# Patient Record
Sex: FEMALE | Race: OTHER | Employment: FULL TIME | URBAN - METROPOLITAN AREA
[De-identification: names, ages, dates, MRNs, and addresses within clinical notes are randomized per-mention and may not be internally consistent; named-entity substitution may affect disease eponyms.]

---

## 2017-01-19 ENCOUNTER — OFFICE VISIT (OUTPATIENT)
Dept: INTERNAL MEDICINE CLINIC | Age: 67
End: 2017-01-19

## 2017-01-19 VITALS
SYSTOLIC BLOOD PRESSURE: 145 MMHG | TEMPERATURE: 97.6 F | RESPIRATION RATE: 16 BRPM | HEIGHT: 62 IN | DIASTOLIC BLOOD PRESSURE: 88 MMHG | BODY MASS INDEX: 26.68 KG/M2 | HEART RATE: 88 BPM | WEIGHT: 145 LBS

## 2017-01-19 DIAGNOSIS — J01.10 SUBACUTE FRONTAL SINUSITIS: Primary | ICD-10-CM

## 2017-01-19 RX ORDER — AZITHROMYCIN 250 MG/1
250 TABLET, FILM COATED ORAL SEE ADMIN INSTRUCTIONS
Qty: 6 TAB | Refills: 0 | Status: SHIPPED | OUTPATIENT
Start: 2017-01-19 | End: 2017-01-24

## 2017-01-19 RX ORDER — HYDROCODONE POLISTIREX AND CHLORPHENIRAMINE POLISTIREX 10; 8 MG/5ML; MG/5ML
1 SUSPENSION, EXTENDED RELEASE ORAL
Qty: 115 ML | Refills: 0 | Status: SHIPPED | OUTPATIENT
Start: 2017-01-19 | End: 2018-02-21

## 2017-01-19 NOTE — PROGRESS NOTES
Subjective:      History was provided by the patient. Spencer Bliss is a 73 y.o. female who presents for evaluation of symptoms of a URI. Symptoms include sinus and nasal congestion, sore throat, nasal blockage and post nasal drip. Onset of symptoms was 3 week ago, gradually worsening since that time. She also c/o 2 days facial pain. She is drinking plenty of fluids. . Evaluation to date: none. Treatment to date: antihistamines, cough suppressants, OTC products  Past Medical History   Diagnosis Date    Aortic calcification ([address] 1/23/2014    Degenerative disc disease 5/23/2012     neck    Hepatitis C antibody test positive 7/14/2015    Hypertension     Lipoma of back 6/15/2012    Mood swings (HCC)      mood swings with menopause    Osteoporosis 12/7/2010    Raynaud's disease      Current Outpatient Prescriptions   Medication Sig Dispense Refill    buPROPion SR (WELLBUTRIN SR) 150 mg SR tablet Take 1 Tab by mouth two (2) times a day. 180 Tab 1    atorvastatin (LIPITOR) 10 mg tablet Take 0.5 Tabs by mouth daily. 90 Tab 0    ondansetron (ZOFRAN ODT) 4 mg disintegrating tablet Take 1 Tab by mouth every eight (8) hours as needed for Nausea. 20 Tab 0    felodipine (PLENDIL SR) 5 mg 24 hr tablet Take 1 Tab by mouth daily. 90 Tab 0    calcium 500 mg tab Take 500 mg by mouth daily.  b complex vitamins tablet Take 1 Tab by mouth daily.  magnesium oxide 500 mg tab Take 500 mg by mouth daily.  aspirin 81 mg chewable tablet Take 81 mg by mouth daily.  co-enzyme Q-10 (CO Q-10) 100 mg capsule Take 100 mg by mouth daily.  KRILL OIL PO Take  by mouth.  Alpha Lipoic Acid 200 mg Tab Take  by mouth.  MILK THISTLE, BULK, by Does Not Apply route.  CHOLECALCIFEROL, VITAMIN D3, (VITAMIN D3 PO) Take 2,000 Units by mouth daily.        Allergies   Allergen Reactions    Keflex [Cephalexin] Angioedema     Social History     Social History    Marital status:      Spouse name: N/A  Number of children: N/A    Years of education: N/A     Occupational History    Not on file. Social History Main Topics    Smoking status: Current Every Day Smoker     Packs/day: 0.50     Years: 15.00     Types: Cigarettes    Smokeless tobacco: Former User     Quit date: 1/27/2014    Alcohol use 1.5 oz/week     3 Glasses of wine per week      Comment: occasionally    Drug use: No    Sexual activity: Not Currently     Other Topics Concern    Not on file     Social History Narrative     Review of Systems  A comprehensive review of systems was negative except for that written in the HPI. Objective:     Visit Vitals    /88    Pulse 88    Temp 97.6 °F (36.4 °C) (Oral)    Resp 16    Ht 5' 2\" (1.575 m)    Wt 145 lb (65.8 kg)    BMI 26.52 kg/m2     General:  alert, cooperative, no distress, appears stated age   Head:  maxillary sinus tenderness bilateral   Eyes: negative   Ears:    Sinus tender: positive   Mouth:  Lips, mucosa, and tongue normal. Teeth and gums normal   Neck: supple, symmetrical, trachea midline, no adenopathy, thyroid: not enlarged, symmetric, no tenderness/mass/nodules, no carotid bruit and no JVD. Lungs: clear to auscultation bilaterally   Abdomen: soft, non-tender. Bowel sounds normal. No masses,  no organomegaly        Assessment:     viral upper respiratory illness and sinusitis    Plan:   Discussed dx and tx of URIs  Suggested symptomatic OTC remedies. Nasal saline sprays for congestion. Antibiotics per orders. RTC prn. French was seen today for cold symptoms. Diagnoses and all orders for this visit:    Subacute frontal sinusitis    Other orders  -     azithromycin (ZITHROMAX) 250 mg tablet; Take 1 Tab by mouth See Admin Instructions for 5 days. -     chlorpheniramine-HYDROcodone (TUSSIONEX) 10-8 mg/5 mL suspension; Take 5 mL by mouth every twelve (12) hours as needed for Cough. Max Daily Amount: 10 mL.

## 2017-01-19 NOTE — MR AVS SNAPSHOT
Visit Information Date & Time Provider Department Dept. Phone Encounter #  
 1/19/2017  8:30 AM Kane Mustafa MD [facility] [phone] [id number] Your Appointments 1/30/2017  3:20 PM  
New Patient with Eduardo Salazar MD  
[id number] [facility]) Appt Note: New Patient, Annual  
 [address] [facility] [address] [facility]  
[phone]  
  
   
 [address] [place] [facility] Upcoming Health Maintenance Date Due COLONOSCOPY 12/10/1969 BREAST CANCER SCRN MAMMOGRAM 12/10/2001 DTaP/Tdap/Td series (1 - Tdap) 1/3/2008 ZOSTER VACCINE AGE 60> 12/10/2011 GLAUCOMA SCREENING Q2Y 12/10/2016 Pneumococcal 65+ Low/Medium Risk (2 of 2 - PPSV23) 12/10/2016 MEDICARE YEARLY EXAM 12/10/2016 Allergies as of 1/19/2017  Review Complete On: 12/5/2016 By: Jemma Sauer LPN Severity Noted Reaction Type Reactions Keflex [Cephalexin]  09/28/2016    Angioedema Current Immunizations  Reviewed on 11/22/2016 Name Date Influenza Vaccine (Quad) PF 11/25/2014 Pneumococcal Vaccine (Unspecified Type) 9/29/2009, 1/2/2008 TD Vaccine 1/2/2008 Not reviewed this visit Vitals BP Pulse Temp Resp Height(growth percentile) Weight(growth percentile) 145/88 88 97.6 °F (36.4 °C) (Oral) 16 5' 2\" (1.575 m) 145 lb (65.8 kg) BMI OB Status Smoking Status 26.52 kg/m2 Postmenopausal Current Every Day Smoker Vitals History BMI and BSA Data Body Mass Index Body Surface Area  
 26.52 kg/m 2 1.7 m 2 Preferred Pharmacy Pharmacy Name Phone [id number] [phone] Your Updated Medication List  
  
   
This list is accurate as of: 1/19/17  9:08 AM.  Always use your most recent med list.  
  
  
  
  
 Alpha Lipoic Acid 200 mg Tab Take  by mouth. aspirin 81 mg chewable tablet Take 81 mg by mouth daily. atorvastatin 10 mg tablet Commonly known as:  LIPITOR Take 0.5 Tabs by mouth daily. azithromycin 250 mg tablet Commonly known as:  Jayna Collar Take 1 Tab by mouth See Admin Instructions for 5 days. b complex vitamins tablet Take 1 Tab by mouth daily. buPROPion  mg SR tablet Commonly known as:  Amanda Veronica Take 1 Tab by mouth two (2) times a day. calcium 500 mg Tab Take 500 mg by mouth daily. chlorpheniramine-HYDROcodone 10-8 mg/5 mL suspension Commonly known as:  Alea Haus Take 5 mL by mouth every twelve (12) hours as needed for Cough. Max Daily Amount: 10 mL. CO Q-10 100 mg capsule Generic drug:  co-enzyme Q-10 Take 100 mg by mouth daily. felodipine 5 mg 24 hr tablet Commonly known as:  PLENDIL SR Take 1 Tab by mouth daily. KRILL OIL PO Take  by mouth.  
  
 magnesium oxide 500 mg Tab Take 500 mg by mouth daily. MILK THISTLE (BULK)  
by Does Not Apply route. ondansetron 4 mg disintegrating tablet Commonly known as:  ZOFRAN ODT Take 1 Tab by mouth every eight (8) hours as needed for Nausea. VITAMIN D3 PO Take 2,000 Units by mouth daily. Prescriptions Printed Refills  
 chlorpheniramine-HYDROcodone (TUSSIONEX) 10-8 mg/5 mL suspension 0 Sig: Take 5 mL by mouth every twelve (12) hours as needed for Cough. Max Daily Amount: 10 mL. Class: Print Route: Oral  
  
Prescriptions Sent to Pharmacy Refills  
 azithromycin (ZITHROMAX) 250 mg tablet 0 Sig: Take 1 Tab by mouth See Admin Instructions for 5 days. Class: Normal  
 Pharmacy[id number] [place], [facility] [address] Ph #: [phone] Route: Oral  
  
To-Do List   
 2017 11:30 AM  
  Appointment with [facility] at [address] ([phone]) Please, no calcium supplements or antacids that coat the stomach (ex: Tums, Mylanta) 24 hours prior to procedure. Maintain normal diet and medications. Dairy products are allowed. Wear an outfit with an elastic waistband (no zipper or metal snaps). Check in at registration 15min before your appointment time unless you were instructed to do otherwise. Introducing [facility]! [employer] introduces CBRITE patient portal. Now you can access parts of your medical record, email your doctor's office, and request medication refills online. 1. In your internet browser, go to [email or web address] 2. Click on the First Time User? Click Here link in the Sign In box. You will see the New Member Sign Up page. 3. Enter your CBRITE Access Code exactly as it appears below. You will not need to use this code after youve completed the sign-up process. If you do not sign up before the expiration date, you must request a new code. · CBRITE Access Code: [email or web address] Expires: 4/17/2017 11:21 AM 
 
4. Enter the last four digits of your Social Security Number (xxxx) and Date of Birth (mm/dd/yyyy) as indicated and click Submit. You will be taken to the next sign-up page. 5. Create a CBRITE ID. This will be your CBRITE login ID and cannot be changed, so think of one that is secure and easy to remember. 6. Create a CBRITE password. You can change your password at any time. 7. Enter your Password Reset Question and Answer. This can be used at a later time if you forget your password. 8. Enter your e-mail address. You will receive e-mail notification when new information is available in [address] 9. Click Sign Up. You can now view and download portions of your medical record. 10. Click the Download Summary menu link to download a portable copy of your medical information.  
 
If you have questions, please visit the Frequently Asked Questions section of the [employer]. Remember, [employer] is NOT to be used for urgent needs. For medical emergencies, dial 911. Now available from your iPhone and Android! Please provide this summary of care documentation to your next provider. Your primary care clinician is listed as Molly Elliott. If you have any questions after today's visit, please call [phone].

## 2017-01-30 ENCOUNTER — HOSPITAL ENCOUNTER (OUTPATIENT)
Dept: MAMMOGRAPHY | Age: 67
Discharge: HOME | End: 2017-01-30
Attending: INTERNAL MEDICINE
Payer: MEDICARE

## 2017-01-30 DIAGNOSIS — M81.0 OSTEOPOROSIS: Primary | ICD-10-CM

## 2017-01-30 DIAGNOSIS — Z78.0 MENOPAUSE: ICD-10-CM

## 2017-01-30 PROCEDURE — 77080 DXA BONE DENSITY AXIAL: CPT

## 2017-01-30 RX ORDER — ALENDRONATE SODIUM 70 MG/1
70 TABLET ORAL
Qty: 12 TAB | Refills: 3 | Status: SHIPPED | OUTPATIENT
Start: 2017-01-30 | End: 2017-12-13 | Stop reason: SINTOL

## 2017-01-30 NOTE — LETTER
2/8/2017 2:33 PM 
 
[facility] [address] [address] Dear Ines Jewell: 
 
Please find your most recent results below. Resulted Orders PTH INTACT Result Value Ref Range PTH, Intact 43 15 - 65 pg/mL Narrative Performed at:  [address]  [id number] : Una Zacarias MD, Phone:  [phone] PHOSPHORUS Result Value Ref Range Phosphorus 3.9 2.5 - 4.5 mg/dL Narrative Performed at:  [address]  [id number] : Una Zacarias MD, Phone:  [phone] PROTEIN ELECTROPHORESIS Result Value Ref Range Protein, total 6.9 6.0 - 8.5 g/dL Albumin 4.4 2.9 - 4.4 g/dL Alpha-1-globulin 0.2 0.0 - 0.4 g/dL ALPHA-2 GLOBULIN 0.7 0.4 - 1.0 g/dL Beta globulin 1.0 0.7 - 1.3 g/dL Gamma globulin 0.6 0.4 - 1.8 g/dL  
 M-spike Not Observed Not Observed g/dL Globulin, total 2.5 2.2 - 3.9 g/dL A/G ratio 1.8 (H) 0.7 - 1.7 Please note Comment Comment:  
   Protein electrophoresis scan will follow via computer, mail, or 
 delivery. Narrative Performed at:  [address]  [id number] : Una Zacarias MD, Phone:  [phone] VITAMIN D, 25 HYDROXY Result Value Ref Range VITAMIN D, 25-HYDROXY 38.5 30.0 - 100.0 ng/mL Comment:  
   Vitamin D deficiency has been defined by the [address] practice guideline as a 
level of serum 25-OH vitamin D less than 20 ng/mL (1,2). The Endocrine Society went on to further define vitamin D 
insufficiency as a level between 21 and 29 ng/mL (2). 1. IOM ([place] of [employer]). 2010. Dietary reference 
   intakes for calcium and D. [facility]: The 
   [employer].  
2. Jess FITCH, Sona RAMIREZ, Adalgisa PHIPPS, et al. 
   Evaluation, treatment, and prevention of vitamin D 
 deficiency: an Endocrine Society clinical practice 
   guideline. JCEM. 2011 Jul; 96(7):1911-30. Narrative Performed at:  [address]  [id number] : Simon Marte MD, Phone:  [phone] CELIAC ANTIBODY PROFILE Result Value Ref Range Immunoglobulin A, Qt. 138 87 - 352 mg/dL Deamidated Gliadin Ab, IgA 2 0 - 19 units Comment:  
                      Negative                   0 - 19 Weak Positive             20 - 30 Moderate to Strong Positive   >30 Deamidated Gliadin Ab, IgG 2 0 - 19 units Comment:  
                      Negative                   0 - 19 Weak Positive             20 - 30 Moderate to Strong Positive   >30 
  
 t-Transglutaminase, IgA <2 0 - 3 U/mL Comment:  
                                 Negative        0 -  3 Weak Positive   4 - 10 Positive           >10 Tissue Transglutaminase (tTG) has been identified 
 as the endomysial antigen. Studies have demonstr- 
 ated that endomysial IgA antibodies have over 99% 
 specificity for gluten sensitive enteropathy. t-Transglutaminase, IgG <2 0 - 5 U/mL Comment:  
                                 Negative        0 - 5 Weak Positive   6 - 9 Positive           >9 Narrative Performed at:  [address]  [id number] : Simon Marte MD, Phone:  [phone] RECOMMENDATIONS: 
   
All the labs are very normal now. Please call me if you have any questions: [phone] Sincerely, Miriam Solo MD

## 2017-01-31 NOTE — PROGRESS NOTES
Advise patient that osteoporosis  Which is bad! is present  ( ordered additional blood work   For bone health now)    Advise begin fosamax 70 weekly along with the vit D and calcium I will order the   Alendronate now    any concerns she should follow up to review  these

## 2017-02-02 NOTE — PROGRESS NOTES
Notified the patient per Dr Nuvia Padilla of the following:  Advise patient that osteoporosis (which is bad!) is present. Additional blood work needed to check for bone health. Advise beginning Fosamax 70 mg weekly along with Vitamin D and Calcium. The new medication has been sent to the pharmacy on file. Provided education to take pill first thing in the morning on empty stomach with a full glass of water.  Must sit upright for 30 minutes.  Do not lay down.  Take on the same day each week. Scheduled the patient to be seen on Monday at 8:45 am for review with Dr Nuvia Padilla.

## 2017-02-06 ENCOUNTER — OFFICE VISIT (OUTPATIENT)
Dept: INTERNAL MEDICINE CLINIC | Age: 67
End: 2017-02-06

## 2017-02-06 ENCOUNTER — HOSPITAL ENCOUNTER (OUTPATIENT)
Dept: LAB | Age: 67
Discharge: HOME | End: 2017-02-06
Payer: MEDICARE

## 2017-02-06 VITALS
BODY MASS INDEX: 27.05 KG/M2 | SYSTOLIC BLOOD PRESSURE: 161 MMHG | OXYGEN SATURATION: 99 % | DIASTOLIC BLOOD PRESSURE: 84 MMHG | WEIGHT: 147 LBS | TEMPERATURE: 97.9 F | RESPIRATION RATE: 15 BRPM | HEIGHT: 62 IN | HEART RATE: 95 BPM

## 2017-02-06 DIAGNOSIS — M54.31 SCIATICA OF RIGHT SIDE: ICD-10-CM

## 2017-02-06 DIAGNOSIS — M81.0 OSTEOPOROSIS: Primary | ICD-10-CM

## 2017-02-06 PROCEDURE — 84100 ASSAY OF PHOSPHORUS: CPT

## 2017-02-06 PROCEDURE — 82784 ASSAY IGA/IGD/IGG/IGM EACH: CPT

## 2017-02-06 PROCEDURE — 82306 VITAMIN D 25 HYDROXY: CPT

## 2017-02-06 PROCEDURE — 83970 ASSAY OF PARATHORMONE: CPT

## 2017-02-06 PROCEDURE — 84165 PROTEIN E-PHORESIS SERUM: CPT

## 2017-02-06 PROCEDURE — 36415 COLL VENOUS BLD VENIPUNCTURE: CPT

## 2017-02-06 RX ORDER — HYDROCODONE BITARTRATE AND ACETAMINOPHEN 5; 325 MG/1; MG/1
1 TABLET ORAL
Qty: 30 TAB | Refills: 0 | Status: SHIPPED | OUTPATIENT
Start: 2017-02-06 | End: 2017-04-18 | Stop reason: SDUPTHER

## 2017-02-06 NOTE — PROGRESS NOTES
Subjective:      Spencer Bliss is a 73 y.o. female who I am asked to see in consultation for evaluation osteoporosis. She was diagnosed with osteoporosis by bone density scan in 2017. Patient has history of fracture. The cause of osteoporosis is felt to be due to postmenopausal estrogen deficiency. She is currently being treated with calcium and vitamin D supplementation. She is not currently being treated with bisphosphonates   Osteoporosis Risk Factors   Nonmodifiable  Personal Hx of fracture as an adult: yes - rib s[pine    Hx of fracture in first-degree relative: no   race: yes  Advanced age: yes  Female sex: yes  Dementia: no  Poor health/frailty: no     Potentially modifiable:  Tobacco use: yes -   Low body weight (<127 lbs): no  Estrogen deficiency     early menopause (age <45) or bilateral ovariectomy: no     prolonged premenopausal amenorrhea (>1 yr): no  Low calcium intake (lifelong): no  Alcoholism: no  Recurrent falls: no  Inadequate physical activity: no    Current calcium and Vit D intake:  Dietary sources: cheese  supplements: yes      Past Medical History   Diagnosis Date    Aortic calcification (HCC) 2014    Degenerative disc disease 2012     neck    Hepatitis C antibody test positive 2015    Hypertension     Lipoma of back 6/15/2012    Mood swings (HCC)      mood swings with menopause    Osteoporosis 2010    Raynaud's disease      Past Surgical History   Procedure Laterality Date    Hx cholecystectomy  1974    Hx  section  0100,[id number],3156      x 3     Family History   Problem Relation Age of Onset    Parkinsonism Mother     Hypertension Father     Diabetes Father     Heart Disease Father 61          Current Outpatient Prescriptions   Medication Sig Dispense Refill    alendronate (FOSAMAX) 70 mg tablet Take 1 Tab by mouth every seven (7) days.  12 Tab 3    chlorpheniramine-HYDROcodone (TUSSIONEX) 10-8 mg/5 mL suspension Take 5 mL by mouth every twelve (12) hours as needed for Cough. Max Daily Amount: 10 mL. 115 mL 0    buPROPion SR (WELLBUTRIN SR) 150 mg SR tablet Take 1 Tab by mouth two (2) times a day. 180 Tab 1    atorvastatin (LIPITOR) 10 mg tablet Take 0.5 Tabs by mouth daily. 90 Tab 0    ondansetron (ZOFRAN ODT) 4 mg disintegrating tablet Take 1 Tab by mouth every eight (8) hours as needed for Nausea. 20 Tab 0    felodipine (PLENDIL SR) 5 mg 24 hr tablet Take 1 Tab by mouth daily. 90 Tab 0    calcium 500 mg tab Take 500 mg by mouth daily.  b complex vitamins tablet Take 1 Tab by mouth daily.  magnesium oxide 500 mg tab Take 500 mg by mouth daily.  aspirin 81 mg chewable tablet Take 81 mg by mouth daily.  co-enzyme Q-10 (CO Q-10) 100 mg capsule Take 100 mg by mouth daily.  KRILL OIL PO Take  by mouth.  Alpha Lipoic Acid 200 mg Tab Take  by mouth.  MILK THISTLE, BULK, by Does Not Apply route.  CHOLECALCIFEROL, VITAMIN D3, (VITAMIN D3 PO) Take 2,000 Units by mouth daily. Allergies   Allergen Reactions    Keflex [Cephalexin] Angioedema     Social History     Social History    Marital status:      Spouse name: N/A    Number of children: N/A    Years of education: N/A     Occupational History    Not on file. Social History Main Topics    Smoking status: Current Every Day Smoker     Packs/day: 0.50     Years: 15.00     Types: Cigarettes    Smokeless tobacco: Former User     Quit date: 1/27/2014    Alcohol use 1.5 oz/week     3 Glasses of wine per week      Comment: occasionally    Drug use: No    Sexual activity: Not Currently     Other Topics Concern    Not on file     Social History Narrative       Review of Systems  Pertinent items are noted in HPI.      Objective:     Visit Vitals    /84 (BP 1 Location: Left arm, BP Patient Position: Sitting)    Pulse 95    Temp 97.9 °F (36.6 °C) (Oral)    Resp 15    Ht 5' 2\" (1.575 m)    Wt 147 lb (66.7 kg)    SpO2 99%    BMI 26.89 kg/m2     General:  alert, cooperative, no distress, appears stated age   Body Habitus not obese   Oropharynx: \    Eyes:      Ears:     Neck:    Thyroid:  no palpable nodule   Lung: clear to auscultation bilaterally   Heart:  regular rate and rhythm, S1, S2 normal, no murmur, click, rub or gallop   Abdomen: soft, non-tender. Bowel sounds normal. No masses,  no organomegaly   Extremities: extremities normal, atraumatic, no cyanosis or edema   Skin: Warm and dry. no hyperpigmentation, vitiligo, or suspicious lesions   Pulses: 2+ and symmetric   Neuro: normal without focal findings   Lower  to right thigh  Imaging  Bone Density: Spine T Score:   DEXA Results (most recent):    Results from Hospital Encounter encounter on 01/30/17   DEXA BONE DENSITY STUDY AXIAL   Narrative Bone Mineral Density    Indication:  screening  Age: 73  Sex: Female. Menopause status: Postmenopausal.  Hormone replacement therapy: No     Number of falls in the past year:   None. Risk factors for osteoporosis:  History of low trauma fracture      Current medication for osteoporosis: None. Comparison: None. Technique: Imaging was performed on the [employer]. World Health Organization  meta-analysis fracture risk calculator (FRAX) analysis was performed for 10 year  fracture risk probability assessment    Excluded sites: None     Findings:      Femoral Neck:  Left  Bone mineral density (gm/cm2):? 0.662  % of peak bone mass: 64  % for age matched controls:? 78  T-score: -2.7  Z-score: -1.3    Total Hip: Left  Bone mineral density (gm/cm2):  0.715  % of peak bone mass:   71  % for age matched controls:  80  T-score:   -2.3  Z-score:  -1.1    Lumbar Spine:  L1-L4  Bone mineral density (gm/cm2):  0.894  % of peak bone mass:  75   % for age matched controls:  90  T-score:  -2.4  Z-score:  -0.9    The T score for the left distal third radius is -3.6. Impression Impression:     This patient is osteoporotic using the World Health Organization criteria  10 year probability of major osteoporotic fracture:  14%  10 year probability of hip fracture:  3.5%    Recommendations:  Therapy recommendations need to be tailored to each individual patient. Using  the [facility]) FRAX absolute fracture algorithm, the  [address] [place] [address] recommends beginning pharmacological therapy in  postmenopausal women and men over the age of 48 with a 8 year probability of a  hip fracture of >3% OR with the 10 year probability of a major osteoporotic  fracture of >20%. Please reconsider testing based on risk factors. Currently, Medicare will only  reimburse for a central DXA examination every two years, unless the patient is  on chronic glucocorticoid therapy. Note: Please note that reliable, valid comparisons cannot be made between  studies which have been performed on machines from different manufacturers. If  clinically warranted, a follow up study performed at this site, on the same  unit, would allow the most sensitive assessment of change in bone mineral  density. Assessment:     Osteoporosis, not on current therapy. Additional labs pending  Plan:   (Note: The National Osteoporosis Foundation recommends pharmacological treatment of all postmenopausal women with T-scores below -2.0, and those with T-scores below -1.5 with risk factors for osteoporosis. Osteoporosis is present when the T-score is below -2.5. The T-score is defined as the number of standard deviations above or below the average BMD value for young, healthy white women.)    1. Bisphosphonate therapy: alendronate (Fosamax), once weekly  Contraindications reviewed: yes  2. Calcium and vitamin D supplementation. Patient advised not to take these supplements at same time as bisphosphonate due to inhibition of absorption: yes  3.  The risks and benefits of my recommendations, as well as other treatment options were discussed with the patient today. Questions were answered. 4. Follow up: 6 month and as needed. 5. Approximately 15 minutes of this 15   minute appointment was spent counseling,  explaining diabetes and its etiology/treatment, and answering questions. There are no diagnoses linked to this encounter.

## 2017-02-06 NOTE — MR AVS SNAPSHOT
Visit Information Date & Time Provider Department Dept. Phone Encounter #  
 2/6/2017  8:45 AM Herman Stone MD [facility] [phone] [id number] Upcoming Health Maintenance Date Due COLONOSCOPY 12/10/1969 BREAST CANCER SCRN MAMMOGRAM 12/10/2001 DTaP/Tdap/Td series (1 - Tdap) 1/3/2008 ZOSTER VACCINE AGE 60> 12/10/2011 GLAUCOMA SCREENING Q2Y 12/10/2016 Pneumococcal 65+ Low/Medium Risk (2 of 2 - PPSV23) 12/10/2016 MEDICARE YEARLY EXAM 12/10/2016 Allergies as of 2/6/2017  Review Complete On: 2/6/2017 By: Gary Rea LPN Severity Noted Reaction Type Reactions Keflex [Cephalexin]  09/28/2016    Angioedema Current Immunizations  Reviewed on 11/22/2016 Name Date Influenza Vaccine (Quad) PF 11/25/2014 Pneumococcal Vaccine (Unspecified Type) 9/29/2009, 1/2/2008 TD Vaccine 1/2/2008 Not reviewed this visit Vitals BP Pulse Temp Resp Height(growth percentile) Weight(growth percentile) 161/84 (BP 1 Location: Left arm, BP Patient Position: Sitting) 95 97.9 °F (36.6 °C) (Oral) 15 5' 2\" (1.575 m) 147 lb (66.7 kg) SpO2 BMI OB Status Smoking Status 99% 26.89 kg/m2 Postmenopausal Current Every Day Smoker BMI and BSA Data Body Mass Index Body Surface Area  
 26.89 kg/m 2 1.71 m 2 Preferred Pharmacy Pharmacy Name Phone [facility][id number]- [place] [address] [place] [address] Ave [phone] Your Updated Medication List  
  
   
This list is accurate as of: 2/6/17  9:15 AM.  Always use your most recent med list.  
  
  
  
  
 alendronate 70 mg tablet Commonly known as:  FOSAMAX Take 1 Tab by mouth every seven (7) days. Alpha Lipoic Acid 200 mg Tab Take  by mouth. aspirin 81 mg chewable tablet Take 81 mg by mouth daily. atorvastatin 10 mg tablet Commonly known as:  LIPITOR Take 0.5 Tabs by mouth daily. b complex vitamins tablet Take 1 Tab by mouth daily. buPROPion  mg SR tablet Commonly known as:  Millicent [place] Take 1 Tab by mouth two (2) times a day. calcium 500 mg Tab Take 500 mg by mouth daily. chlorpheniramine-HYDROcodone 10-8 mg/5 mL suspension Commonly known as:  Barbara Horsfall Take 5 mL by mouth every twelve (12) hours as needed for Cough. Max Daily Amount: 10 mL. CO Q-10 100 mg capsule Generic drug:  co-enzyme Q-10 Take 100 mg by mouth daily. felodipine 5 mg 24 hr tablet Commonly known as:  PLENDIL SR Take 1 Tab by mouth daily. HYDROcodone-acetaminophen 5-325 mg per tablet Commonly known as:  Lynnetta Brower Take 1 Tab by mouth every six (6) hours as needed for Pain. Max Daily Amount: 4 Tabs. KRILL OIL PO Take  by mouth.  
  
 magnesium oxide 500 mg Tab Take 500 mg by mouth daily. MILK THISTLE (BULK)  
by Does Not Apply route. ondansetron 4 mg disintegrating tablet Commonly known as:  ZOFRAN ODT Take 1 Tab by mouth every eight (8) hours as needed for Nausea. VITAMIN D3 PO Take 2,000 Units by mouth daily. Prescriptions Printed Refills HYDROcodone-acetaminophen (NORCO) 5-325 mg per tablet 0 Sig: Take 1 Tab by mouth every six (6) hours as needed for Pain. Max Daily Amount: 4 Tabs. Class: Print Route: Oral  
  
Introducing [facility] & HEALTH SERVICES! Stewart Kemp introduces [employer] patient portal. Now you can access parts of your medical record, email your doctor's office, and request medication refills online. 1. In your internet browser, go to [email or web address] 2. Click on the First Time User? Click Here link in the Sign In box. You will see the New Member Sign Up page. 3. Enter your [employer] Access Code exactly as it appears below. You will not need to use this code after youve completed the sign-up process.  If you do not sign up before the expiration date, you must request a new code. · Collect Access Code: [email or web address] Expires: 4/17/2017 11:21 AM 
 
4. Enter the last four digits of your Social Security Number (xxxx) and Date of Birth (mm/dd/yyyy) as indicated and click Submit. You will be taken to the next sign-up page. 5. Create a Collect ID. This will be your Collect login ID and cannot be changed, so think of one that is secure and easy to remember. 6. Create a Collect password. You can change your password at any time. 7. Enter your Password Reset Question and Answer. This can be used at a later time if you forget your password. 8. Enter your e-mail address. You will receive e-mail notification when new information is available in [address] 9. Click Sign Up. You can now view and download portions of your medical record. 10. Click the Download Summary menu link to download a portable copy of your medical information. If you have questions, please visit the Frequently Asked Questions section of the Collect website. Remember, Collect is NOT to be used for urgent needs. For medical emergencies, dial 911. Now available from your iPhone and Android! Please provide this summary of care documentation to your next provider. Your primary care clinician is listed as Vero Khan. If you have any questions after today's visit, please call [phone].

## 2017-02-08 LAB
25(OH)D3+25(OH)D2 SERPL-MCNC: 38.5 NG/ML (ref 30–100)
ALBUMIN SERPL ELPH-MCNC: 4.4 G/DL (ref 2.9–4.4)
ALBUMIN/GLOB SERPL: 1.8 {RATIO} (ref 0.7–1.7)
ALPHA1 GLOB SERPL ELPH-MCNC: 0.2 G/DL (ref 0–0.4)
ALPHA2 GLOB SERPL ELPH-MCNC: 0.7 G/DL (ref 0.4–1)
B-GLOBULIN SERPL ELPH-MCNC: 1 G/DL (ref 0.7–1.3)
GAMMA GLOB SERPL ELPH-MCNC: 0.6 G/DL (ref 0.4–1.8)
GLIADIN PEPTIDE IGA SER-ACNC: 2 UNITS (ref 0–19)
GLIADIN PEPTIDE IGG SER-ACNC: 2 UNITS (ref 0–19)
GLOBULIN SER CALC-MCNC: 2.5 G/DL (ref 2.2–3.9)
IGA SERPL-MCNC: 138 MG/DL (ref 87–352)
M PROTEIN SERPL ELPH-MCNC: ABNORMAL G/DL
PHOSPHATE SERPL-MCNC: 3.9 MG/DL (ref 2.5–4.5)
PLEASE NOTE, 011150: ABNORMAL
PROT SERPL-MCNC: 6.9 G/DL (ref 6–8.5)
PTH-INTACT SERPL-MCNC: 43 PG/ML (ref 15–65)
TTG IGA SER-ACNC: <2 U/ML (ref 0–3)
TTG IGG SER-ACNC: <2 U/ML (ref 0–5)

## 2017-02-08 NOTE — PROGRESS NOTES
Letter sent to the address on file to notify the patient per Dr Juvenal Landaverde that all the labs are very normal now. Asked for a return call to the office with any further questions.

## 2017-02-22 RX ORDER — FELODIPINE 5 MG/1
TABLET, EXTENDED RELEASE ORAL
Qty: 90 TAB | Refills: 1 | Status: SHIPPED | OUTPATIENT
Start: 2017-02-22 | End: 2017-09-01 | Stop reason: SDUPTHER

## 2017-03-27 RX ORDER — BUPROPION HYDROCHLORIDE 150 MG/1
TABLET, EXTENDED RELEASE ORAL
Qty: 180 TAB | Refills: 1 | Status: SHIPPED | OUTPATIENT
Start: 2017-03-27 | End: 2017-10-28 | Stop reason: SDUPTHER

## 2017-04-13 ENCOUNTER — OFFICE VISIT (OUTPATIENT)
Dept: INTERNAL MEDICINE CLINIC | Age: 67
End: 2017-04-13

## 2017-04-13 VITALS
OXYGEN SATURATION: 97 % | WEIGHT: 145 LBS | RESPIRATION RATE: 15 BRPM | BODY MASS INDEX: 26.52 KG/M2 | SYSTOLIC BLOOD PRESSURE: 140 MMHG | DIASTOLIC BLOOD PRESSURE: 90 MMHG | TEMPERATURE: 98.2 F | HEART RATE: 95 BPM

## 2017-04-13 DIAGNOSIS — J18.9 PNEUMONIA OF RIGHT LOWER LOBE DUE TO INFECTIOUS ORGANISM: Primary | ICD-10-CM

## 2017-04-13 PROBLEM — Z72.0 TOBACCO ABUSE: Status: ACTIVE | Noted: 2017-04-13

## 2017-04-13 RX ORDER — LEVOFLOXACIN 750 MG/1
750 TABLET ORAL DAILY
Qty: 5 TAB | Refills: 0 | Status: SHIPPED | OUTPATIENT
Start: 2017-04-13 | End: 2017-12-13 | Stop reason: ALTCHOICE

## 2017-04-13 NOTE — PROGRESS NOTES
Subjective:      History was provided by the patient. Bobby Rucker is a 73 y.o. female who presents for evaluation of symptoms of a URI.m   For 6 weeks Symptoms include cough mucous  and throat,  sore. Onset of symptoms was 3 week ago, gradually worsening since that time. Some hot flashes. She is drinking plenty of fluids. . Evaluation to date: none. Treatment to date: antihistamines, cough suppressants, OTC products   Sweats   Some wheeze  Past Medical History:   Diagnosis Date    Aortic calcification (HCC) 1/23/2014    Degenerative disc disease 5/23/2012    neck    Hepatitis C antibody test positive 7/14/2015    Hypertension     Lipoma of back 6/15/2012    Mood swings (HCC)     mood swings with menopause    Osteoporosis 12/7/2010    Raynaud's disease      Current Outpatient Prescriptions   Medication Sig Dispense Refill    buPROPion SR (WELLBUTRIN SR) 150 mg SR tablet TAKE 1 TAB BY MOUTH TWO (2) TIMES A DAY. 180 Tab 1    felodipine (PLENDIL SR) 5 mg 24 hr tablet TAKE 1 TABLET BY MOUTH DAILY. 90 Tab 1    HYDROcodone-acetaminophen (NORCO) 5-325 mg per tablet Take 1 Tab by mouth every six (6) hours as needed for Pain. Max Daily Amount: 4 Tabs. 30 Tab 0    alendronate (FOSAMAX) 70 mg tablet Take 1 Tab by mouth every seven (7) days. 12 Tab 3    chlorpheniramine-HYDROcodone (TUSSIONEX) 10-8 mg/5 mL suspension Take 5 mL by mouth every twelve (12) hours as needed for Cough. Max Daily Amount: 10 mL. 115 mL 0    atorvastatin (LIPITOR) 10 mg tablet Take 0.5 Tabs by mouth daily. 90 Tab 0    ondansetron (ZOFRAN ODT) 4 mg disintegrating tablet Take 1 Tab by mouth every eight (8) hours as needed for Nausea. 20 Tab 0    felodipine (PLENDIL SR) 5 mg 24 hr tablet Take 1 Tab by mouth daily. 90 Tab 0    calcium 500 mg tab Take 500 mg by mouth daily.  b complex vitamins tablet Take 1 Tab by mouth daily.  magnesium oxide 500 mg tab Take 500 mg by mouth daily.       aspirin 81 mg chewable tablet Take 81 mg by mouth daily.  co-enzyme Q-10 (CO Q-10) 100 mg capsule Take 100 mg by mouth daily.  KRILL OIL PO Take  by mouth.  Alpha Lipoic Acid 200 mg Tab Take  by mouth.  MILK THISTLE, BULK, by Does Not Apply route.  CHOLECALCIFEROL, VITAMIN D3, (VITAMIN D3 PO) Take 2,000 Units by mouth daily. Allergies   Allergen Reactions    Keflex [Cephalexin] Angioedema     Social History     Social History    Marital status:      Spouse name: N/A    Number of children: N/A    Years of education: N/A     Occupational History    Not on file. Social History Main Topics    Smoking status: Current Every Day Smoker     Packs/day: 0.50     Years: 15.00     Types: Cigarettes    Smokeless tobacco: Former User     Quit date: 1/27/2014    Alcohol use 1.5 oz/week     3 Glasses of wine per week      Comment: occasionally    Drug use: No    Sexual activity: Not Currently     Other Topics Concern    Not on file     Social History Narrative     Review of Systems  A comprehensive review of systems was negative except for that written in the HPI. Objective:     Visit Vitals    BP (!) 153/97 (BP 1 Location: Left arm, BP Patient Position: Sitting)    Pulse 95    Temp 98.2 °F (36.8 °C) (Oral)    Resp 15    Wt 145 lb (65.8 kg)    SpO2 97%    BMI 26.52 kg/m2     General:  alert, cooperative, no distress, appears stated age       Eyes: negative   Ears:    Sinus tender: positive   Mouth:  Lips, mucosa, and tongue normal. Teeth and gums normal   Neck: supple, symmetrical, trachea midline, no adenopathy, thyroid: not enlarged, symmetric, no tenderness/mass/nodules, no carotid bruit and no JVD. Lungs:    Rales right base   Abdomen: soft, non-tender. Bowel sounds normal. No masses,  no organomegaly        Assessment:     Pneumonia   Cap  Or nursing home acquired    Plan:   Discussed dx and tx of URIs  Suggested symptomatic OTC remedies. Nasal saline sprays for congestion.   Antibiotics per orders. RTC prn. There are no diagnoses linked to this encounter. French was seen today for cough, skin problem and fatigue. Diagnoses and all orders for this visit:    Pneumonia of right lower lobe due to infectious organism  -     XR CHEST PA LAT; Future  -     levoFLOXacin (LEVAQUIN) 750 mg tablet; Take 1 Tab by mouth daily. Later pfts  The patient was counseled on the dangers of tobacco use, and was advised to quit and referred to a tobacco cessation program.  Reviewed strategies to maximize success, including removing cigarettes and smoking materials from environment, stress management, substitution of other forms of reinforcement, support of family/friends and quit date .

## 2017-04-13 NOTE — MR AVS SNAPSHOT
Visit Information Date & Time Provider Department Dept. Phone Encounter #  
 4/13/2017  3:45 PM Dorian Billingsley MD [facility] [phone] [id number] Upcoming Health Maintenance Date Due COLONOSCOPY 12/10/1969 BREAST CANCER SCRN MAMMOGRAM 12/10/2001 DTaP/Tdap/Td series (1 - Tdap) 1/3/2008 ZOSTER VACCINE AGE 60> 12/10/2011 GLAUCOMA SCREENING Q2Y 12/10/2016 Pneumococcal 65+ Low/Medium Risk (2 of 2 - PPSV23) 12/10/2016 MEDICARE YEARLY EXAM 12/10/2016 Allergies as of 4/13/2017  Review Complete On: 4/13/2017 By: Elicia Lopez LPN Severity Noted Reaction Type Reactions Keflex [Cephalexin]  09/28/2016    Angioedema Current Immunizations  Reviewed on 11/22/2016 Name Date Influenza Vaccine (Quad) PF 11/25/2014 Pneumococcal Vaccine (Unspecified Type) 9/29/2009, 1/2/2008 TD Vaccine 1/2/2008 Not reviewed this visit You Were Diagnosed With   
  
 Codes Comments Influenza and pneumonia    -  Primary ICD-10-CM: J11.00 ICD-9-CM: 487.0 Pneumonia of right lower lobe due to infectious organism     ICD-10-CM: J18.1 ICD-9-CM: 253 Vitals BP Pulse Temp Resp Weight(growth percentile) SpO2  
 (!) 153/97 (BP 1 Location: Left arm, BP Patient Position: Sitting) 95 98.2 °F (36.8 °C) (Oral) 15 145 lb (65.8 kg) 97% BMI OB Status Smoking Status 26.52 kg/m2 Postmenopausal Current Every Day Smoker BMI and BSA Data Body Mass Index Body Surface Area  
 26.52 kg/m 2 1.7 m 2 Preferred Pharmacy [facility]acy Name Phone [id number], [address] [phone] Your Updated Medication List  
  
   
This list is accurate as of: 4/13/17  4:11 PM.  Always use your most recent med list.  
  
  
  
  
 alendronate 70 mg tablet Commonly known as:  FOSAMAX Take 1 Tab by mouth every seven (7) days. Alpha Lipoic Acid 200 mg Tab Take  by mouth. aspirin 81 mg chewable tablet Take 81 mg by mouth daily. atorvastatin 10 mg tablet Commonly known as:  LIPITOR Take 0.5 Tabs by mouth daily. b complex vitamins tablet Take 1 Tab by mouth daily. buPROPion  mg SR tablet Commonly known as:  WELLBUTRIN SR  
TAKE 1 TAB BY MOUTH TWO (2) TIMES A DAY. calcium 500 mg Tab Take 500 mg by mouth daily. chlorpheniramine-HYDROcodone 10-8 mg/5 mL suspension Commonly known as:  Ayla Amend Take 5 mL by mouth every twelve (12) hours as needed for Cough. Max Daily Amount: 10 mL. CO Q-10 100 mg capsule Generic drug:  co-enzyme Q-10 Take 100 mg by mouth daily. * felodipine 5 mg 24 hr tablet Commonly known as:  PLENDIL SR Take 1 Tab by mouth daily. * felodipine 5 mg 24 hr tablet Commonly known as:  PLENDIL SR  
TAKE 1 TABLET BY MOUTH DAILY. HYDROcodone-acetaminophen 5-325 mg per tablet Commonly known as:  Marlaine Roberto Take 1 Tab by mouth every six (6) hours as needed for Pain. Max Daily Amount: 4 Tabs. KRILL OIL PO Take  by mouth.  
  
 levoFLOXacin 750 mg tablet Commonly known as:  Flom Duster Take 1 Tab by mouth daily. magnesium oxide 500 mg Tab Take 500 mg by mouth daily. MILK THISTLE (BULK)  
by Does Not Apply route. ondansetron 4 mg disintegrating tablet Commonly known as:  ZOFRAN ODT Take 1 Tab by mouth every eight (8) hours as needed for Nausea. VITAMIN D3 PO Take 2,000 Units by mouth daily. * Notice: This list has 2 medication(s) that are the same as other medications prescribed for you. Read the directions carefully, and ask your doctor or other care provider to review them with you. Prescriptions Sent to Pharmacy Refills  
 levoFLOXacin (LEVAQUIN) 750 mg tablet 0 Sig: Take 1 Tab by mouth daily.   
 Class: Normal  
 Pharmacy: [facility]/pharmacy #[id number]- [place], [address]  #: [phone] Route: Oral  
  
To-Do List   
 2017 Imaging:  XR CHEST PA LAT   
  
 2017 10:30 AM  
  Appointment with [facility] PAT EXAM RM 1 at [facility] 17 ([phone]) Introducing [facility] & HEALTH SERVICES! [facility] introduces Xcerion patient portal. Now you can access parts of your medical record, email your doctor's office, and request medication refills online. 1. In your internet browser, go to [email or web address][email or web address] [employer]/"[employer] 2. Click on the First Time User? Click Here link in the Sign In box. You will see the New Member Sign Up page. 3. Enter your Xcerion Access Code exactly as it appears below. You will not need to use this code after youve completed the sign-up process. If you do not sign up before the expiration date, you must request a new code. · Xcerion Access Code: [email or web address] Expires: 2017 12:21 PM 
 
4. Enter the last four digits of your Social Security Number (xxxx) and Date of Birth (mm/dd/yyyy) as indicated and click Submit. You will be taken to the next sign-up page. 5. Create a Xcerion ID. This will be your Xcerion login ID and cannot be changed, so think of one that is secure and easy to remember. 6. Create a Xcerion password. You can change your password at any time. 7. Enter your Password Reset Question and Answer. This can be used at a later time if you forget your password. 8. Enter your e-mail address. You will receive e-mail notification when new information is available in [address] 9. Click Sign Up. You can now view and download portions of your medical record. 10. Click the Download Summary menu link to download a portable copy of your medical information. If you have questions, please visit the Frequently Asked Questions section of the Xcerion website.  Remember, Xcerion is NOT to be used for urgent needs. For medical emergencies, dial 911. Now available from your iPhone and Android! Please provide this summary of care documentation to your next provider. Your primary care clinician is listed as Tracy Cordova. If you have any questions after today's visit, please call [phone].

## 2017-04-14 ENCOUNTER — HOSPITAL ENCOUNTER (OUTPATIENT)
Dept: GENERAL RADIOLOGY | Age: 67
Discharge: HOME | End: 2017-04-14
Attending: INTERNAL MEDICINE
Payer: MEDICARE

## 2017-04-14 DIAGNOSIS — J18.9 PNEUMONIA OF RIGHT LOWER LOBE DUE TO INFECTIOUS ORGANISM: ICD-10-CM

## 2017-04-14 PROCEDURE — 71020 XR CHEST PA LAT: CPT

## 2017-04-17 ENCOUNTER — TELEPHONE (OUTPATIENT)
Dept: INTERNAL MEDICINE CLINIC | Age: 67
End: 2017-04-17

## 2017-04-17 NOTE — TELEPHONE ENCOUNTER
Writer spoke with patient and informed her Per Dr Sara Hinkle she can stay out few more days, patient states she would like to make an appointment with Dr Sara Hinkle to discuss further treatment options.  Patient is coming in tomorrow at 2:15pm  To see Dr Sara Hinkle

## 2017-04-17 NOTE — PROGRESS NOTES
No pneumonia  But a vertebrae in her   Mid spine has collapsed  Since last xray  3 years ago      Having pain ? ?     Likely from osteoporosis and smoking makes bone loss worse

## 2017-04-17 NOTE — PROGRESS NOTES
Writer spoke with patient and informed her Per Dr Tomás Giles No pneumonia seen but vertebra in her mid spine has collapsed since last xray 3 years ago, likely from osteoporosis and smoking makes bone loss worse.  Patient expressed verbal understanding but will like to know any recommendations from Dr Tomás Giles

## 2017-04-18 ENCOUNTER — OFFICE VISIT (OUTPATIENT)
Dept: INTERNAL MEDICINE CLINIC | Age: 67
End: 2017-04-18

## 2017-04-18 VITALS
SYSTOLIC BLOOD PRESSURE: 140 MMHG | WEIGHT: 141 LBS | OXYGEN SATURATION: 95 % | DIASTOLIC BLOOD PRESSURE: 98 MMHG | TEMPERATURE: 97.8 F | HEIGHT: 62 IN | HEART RATE: 90 BPM | RESPIRATION RATE: 16 BRPM | BODY MASS INDEX: 25.95 KG/M2

## 2017-04-18 DIAGNOSIS — S22.068A OTHER CLOSED FRACTURE OF EIGHTH THORACIC VERTEBRA, INITIAL ENCOUNTER (HCC): Primary | ICD-10-CM

## 2017-04-18 DIAGNOSIS — R06.09 DYSPNEA ON EXERTION: ICD-10-CM

## 2017-04-18 RX ORDER — HYDROCODONE BITARTRATE AND ACETAMINOPHEN 5; 325 MG/1; MG/1
1 TABLET ORAL
Qty: 28 TAB | Refills: 0 | Status: SHIPPED | OUTPATIENT
Start: 2017-04-18 | End: 2017-12-13 | Stop reason: ALTCHOICE

## 2017-04-18 NOTE — LETTER
NOTIFICATION RETURN TO WORK  
 
4/18/2017 2:36 PM 
 
Ms. French [address]les [address] [address] [facility] [address] [phone] To Whom It May Concern: 
 
Yoly Gonzalez is currently under the care of Daija Wright.. She will return to work: Tuesday, April 25th 2017 If there are questions or concerns please have the patient contact our office. Sincerely, Brice Sharp MD

## 2017-04-18 NOTE — MR AVS SNAPSHOT
Visit Information Date & Time Provider Department Dept. Phone Encounter #  
 4/18/2017  2:15 PM Allan Morel, [address] [facility] [phone] [id number] Upcoming Health Maintenance Date Due COLONOSCOPY 12/10/1969 BREAST CANCER SCRN MAMMOGRAM 12/10/2001 DTaP/Tdap/Td series (1 - Tdap) 1/3/2008 ZOSTER VACCINE AGE 60> 12/10/2011 GLAUCOMA SCREENING Q2Y 12/10/2016 Pneumococcal 65+ Low/Medium Risk (2 of 2 - PPSV23) 12/10/2016 MEDICARE YEARLY EXAM 12/10/2016 Allergies as of 4/18/2017  Review Complete On: 4/13/2017 By: Hermilo Friend, LPN Severity Noted Reaction Type Reactions Keflex [Cephalexin]  09/28/2016    Angioedema Current Immunizations  Reviewed on 11/22/2016 Name Date Influenza Vaccine (Quad) PF 11/25/2014 Pneumococcal Vaccine (Unspecified Type) 9/29/2009, 1/2/2008 TD Vaccine 1/2/2008 Not reviewed this visit You Were Diagnosed With   
  
 Codes Comments Other closed fracture of eighth thoracic vertebra, initial encounter    -  Primary ICD-10-CM: S22.068A ICD-9-CM: 713. 2 Dyspnea on exertion     ICD-10-CM: R06.09 
ICD-9-CM: 786.09 Vitals BP Pulse Temp Resp Height(growth percentile) Weight(growth percentile) (!) 140/98 90 97.8 °F (36.6 °C) (Oral) 16 5' 2\" (1.575 m) 141 lb (64 kg) SpO2 BMI OB Status Smoking Status 95% 25.79 kg/m2 Postmenopausal Current Every Day Smoker Vitals History BMI and BSA Data Body Mass Index Body Surface Area 25.79 kg/m 2 1.67 m 2 Preferred Pharmacy [facility] Name Phone [id number] [address] [phone] Your Updated Medication List  
  
   
This list is accurate as of: 4/18/17  3:11 PM.  Always use your most recent med list.  
  
  
  
  
 alendronate 70 mg tablet Commonly known as:  FOSAMAX Take 1 Tab by mouth every seven (7) days. Alpha Lipoic Acid 200 mg Tab Take  by mouth. aspirin 81 mg chewable tablet Take 81 mg by mouth daily. atorvastatin 10 mg tablet Commonly known as:  LIPITOR Take 0.5 Tabs by mouth daily. b complex vitamins tablet Take 1 Tab by mouth daily. buPROPion  mg SR tablet Commonly known as:  WELLBUTRIN SR  
TAKE 1 TAB BY MOUTH TWO (2) TIMES A DAY. calcium 500 mg Tab Take 500 mg by mouth daily. chlorpheniramine-HYDROcodone 10-8 mg/5 mL suspension Commonly known as:  Amelie Newness Take 5 mL by mouth every twelve (12) hours as needed for Cough. Max Daily Amount: 10 mL. CO Q-10 100 mg capsule Generic drug:  co-enzyme Q-10 Take 100 mg by mouth daily. * felodipine 5 mg 24 hr tablet Commonly known as:  PLENDIL SR Take 1 Tab by mouth daily. * felodipine 5 mg 24 hr tablet Commonly known as:  PLENDIL SR  
TAKE 1 TABLET BY MOUTH DAILY. HYDROcodone-acetaminophen 5-325 mg per tablet Commonly known as:  [address] Take 1 Tab by mouth every six (6) hours as needed for Pain. Max Daily Amount: 4 Tabs. KRILL OIL PO Take  by mouth.  
  
 levoFLOXacin 750 mg tablet Commonly known as:  Chantell Croft Take 1 Tab by mouth daily. magnesium oxide 500 mg Tab Take 500 mg by mouth daily. MILK THISTLE (BULK)  
by Does Not Apply route. ondansetron 4 mg disintegrating tablet Commonly known as:  ZOFRAN ODT Take 1 Tab by mouth every eight (8) hours as needed for Nausea. VITAMIN D3 PO Take 2,000 Units by mouth daily. * Notice: This list has 2 medication(s) that are the same as other medications prescribed for you. Read the directions carefully, and ask your doctor or other care provider to review them with you. Prescriptions Printed Refills HYDROcodone-acetaminophen (NORCO) 5-325 mg per tablet 0 Sig: Take 1 Tab by mouth every six (6) hours as needed for Pain. Max Daily Amount: 4 Tabs. Class: Print Route: Oral  
  
We Performed the Following AMB POC SPIROMETRY REVIEW/INTERP [id number] CPT(R)] To-Do List   
 05/22/2017 10:30 AM  
  Appointment with [facility] PAT EXAM RM 1 at [facility] 17 ([phone]) Introducing [facility] SERVICES! Peg Hemp introduces [employer] patient portal. Now you can access parts of your medical record, email your doctor's office, and request medication refills online. 1. In your internet browser, go to [email or web address] 2. Click on the First Time User? Click Here link in the Sign In box. You will see the New Member Sign Up page. 3. Enter your [employer] Access Code exactly as it appears below. You will not need to use this code after youve completed the sign-up process. If you do not sign up before the expiration date, you must request a new code. · [employer] Access Code: [email or web address] Expires: 7/17/2017  3:11 PM 
 
4. Enter the last four digits of your Social Security Number (xxxx) and Date of Birth (mm/dd/yyyy) as indicated and click Submit. You will be taken to the next sign-up page. 5. Create a [employer] ID. This will be your [employer] login ID and cannot be changed, so think of one that is secure and easy to remember. 6. Create a [employer] password. You can change your password at any time. 7. Enter your Password Reset Question and Answer. This can be used at a later time if you forget your password. 8. Enter your e-mail address. You will receive e-mail notification when new information is available in [address] 9. Click Sign Up. You can now view and download portions of your medical record. 10. Click the Download Summary menu link to download a portable copy of your medical information. If you have questions, please visit the Frequently Asked Questions section of the [employer] website. Remember, [employer] is NOT to be used for urgent needs. For medical emergencies, dial 911. Now available from your iPhone and Android! Please provide this summary of care documentation to your next provider. Your primary care clinician is listed as Carlos Landaverde. If you have any questions after today's visit, please call [phone].

## 2017-04-18 NOTE — PROGRESS NOTES
Chief Complaint   Patient presents with    Follow-up     chest xray, pt having pain and shortness of breath      T 8 compression now seen on xray    XR Results (most recent):    Results from Hospital Encounter encounter on 04/14/17   XR CHEST PA LAT   Narrative INDICATION:  Complaining of cold with cough since January. History of smoking     Exam: Chest 2 views. Comparison January 23, 2014. Findings: Cardiomediastinal silhouette is normal. Aorta is ectatic. Pulmonary  vasculature is not engorged. No focal parenchymal opacities, effusions, or  pneumothorax. Bones are osteopenic. There is interval collapse of approximately  T8. .         Impression Impression:  1. No acute cardiopulmonary disease  2. Interval collapse of approximately T8. Correlate for mid back tenderness and  if indicated MR imaging could better evaluate    23X            Had   Xray at [address] [place] [address] in November 2016    Subjective:  73year old with osteoporosis, was here last week with respiratory infection. Was placed on antibiotic. She has a history of tobacco and maybe some underlying COPD. She notes that she developed pain in her posterior ribcage in the area of her spine. Her chest xray that was done showed a T8 fracture, which appeared to be new, although the most recent xray from November, 2016 is at [facility] and we don't have a copy of it. She says the pain is letting up. She's asking for a refill of Hydrocodone for it. She is short of breath she says when she exerts herself. The cough is slowly getting better and she's finishing her course of antibiotics. Physical Examination:  Her blood pressure was as noted. She had point tenderness around T8-T9, a little bit to the side. Her lungs sounded clear. Plan:  1. Will do a spirometry because of the dyspnea. 2. Encouraged her to quit smoking sooner. She is planning on quitting on smoking very soon. I told her the sooner the better.     3. She will get us a copy from [facility] of the old xray and once we see that we can compare. She will drop off the xray to the imaging center and have them do a direct comparison to see if this fracture is new. I offered her an MRI, she really didn't want one. I told her that this will normally heal with time. Vitals:    04/18/17 1414   BP: (!) 140/98   Pulse: 90   Resp: 16   Temp: 97.8 °F (36.6 °C)   TempSrc: Oral   SpO2: 95%   Weight: 141 lb (64 kg)   Height: 5' 2\" (1.575 m)     French was seen today for follow-up. Diagnoses and all orders for this visit:    Other closed fracture of eighth thoracic vertebra, initial encounter    Dyspnea on exertion  -     AMB POC SPIROMETRY REVIEW/INTERP    Other orders  -     HYDROcodone-acetaminophen (NORCO) 5-325 mg per tablet; Take 1 Tab by mouth every six (6) hours as needed for Pain. Max Daily Amount: 4 Tabs. The patient was counseled on the dangers of tobacco use, and was advised to quit. Reviewed strategies to maximize success, including removing cigarettes and smoking materials from environment and substitution of other forms of reinforcement.       PFT poor quality but seems normal

## 2017-04-24 LAB — SPIROMETRY INTERPRETATION, SPITPOCT: NORMAL

## 2017-05-01 ENCOUNTER — OFFICE VISIT (OUTPATIENT)
Dept: INTERNAL MEDICINE CLINIC | Age: 67
End: 2017-05-01

## 2017-05-01 VITALS
SYSTOLIC BLOOD PRESSURE: 135 MMHG | RESPIRATION RATE: 16 BRPM | OXYGEN SATURATION: 98 % | HEART RATE: 92 BPM | BODY MASS INDEX: 26.31 KG/M2 | DIASTOLIC BLOOD PRESSURE: 80 MMHG | WEIGHT: 143 LBS | HEIGHT: 62 IN

## 2017-05-01 DIAGNOSIS — S22.060A TRAUMATIC COMPRESSION FRACTURE OF T8 THORACIC VERTEBRA, CLOSED, INITIAL ENCOUNTER (HCC): Primary | ICD-10-CM

## 2017-05-01 DIAGNOSIS — K29.00 ACUTE SUPERFICIAL GASTRITIS WITHOUT HEMORRHAGE: ICD-10-CM

## 2017-05-01 RX ORDER — HYDROCODONE BITARTRATE AND ACETAMINOPHEN 7.5; 325 MG/1; MG/1
1 TABLET ORAL
Qty: 30 TAB | Refills: 0 | Status: SHIPPED | OUTPATIENT
Start: 2017-05-01 | End: 2017-12-13 | Stop reason: ALTCHOICE

## 2017-05-01 RX ORDER — PHENOL/SODIUM PHENOLATE
20 AEROSOL, SPRAY (ML) MUCOUS MEMBRANE DAILY
Qty: 30 TAB | Refills: 0 | Status: SHIPPED | OUTPATIENT
Start: 2017-05-01 | End: 2018-02-21

## 2017-05-01 NOTE — PROGRESS NOTES
Chief Complaint   Patient presents with    Back Pain     severe pain getting worse in back,     Abdominal Pain     stomach pain since wed; ulcer? Was using a large amount of advil for back pain now has stopped pain worse thoracic and lumbar for 4 days  tums helps a little  Had T8 compression seen 2 weeks ago  Patient Active Problem List    Diagnosis    Tobacco abuse    Hepatitis C antibody test positive     Likely from blood transfusion        Aortic calcification (HCC)    Lipoma of back    Degenerative disc disease    HLD (hyperlipidemia)    Osteoporosis     Never took osteoporosis medication due to worry about side effects      Hypertension    Raynaud's disease    Depression     Current Outpatient Prescriptions   Medication Sig    Omeprazole delayed release (PRILOSEC D/R) 20 mg tablet Take 1 Tab by mouth daily.  HYDROcodone-acetaminophen (NORCO) 7.5-325 mg per tablet Take 1 Tab by mouth every six (6) hours as needed for Pain. Max Daily Amount: 4 Tabs.  buPROPion SR (WELLBUTRIN SR) 150 mg SR tablet TAKE 1 TAB BY MOUTH TWO (2) TIMES A DAY.  felodipine (PLENDIL SR) 5 mg 24 hr tablet TAKE 1 TABLET BY MOUTH DAILY.  alendronate (FOSAMAX) 70 mg tablet Take 1 Tab by mouth every seven (7) days.  atorvastatin (LIPITOR) 10 mg tablet Take 0.5 Tabs by mouth daily.  felodipine (PLENDIL SR) 5 mg 24 hr tablet Take 1 Tab by mouth daily.  HYDROcodone-acetaminophen (NORCO) 5-325 mg per tablet Take 1 Tab by mouth every six (6) hours as needed for Pain. Max Daily Amount: 4 Tabs.  levoFLOXacin (LEVAQUIN) 750 mg tablet Take 1 Tab by mouth daily.  chlorpheniramine-HYDROcodone (TUSSIONEX) 10-8 mg/5 mL suspension Take 5 mL by mouth every twelve (12) hours as needed for Cough. Max Daily Amount: 10 mL.  ondansetron (ZOFRAN ODT) 4 mg disintegrating tablet Take 1 Tab by mouth every eight (8) hours as needed for Nausea.  calcium 500 mg tab Take 500 mg by mouth daily.     b complex vitamins tablet Take 1 Tab by mouth daily.  magnesium oxide 500 mg tab Take 500 mg by mouth daily.  aspirin 81 mg chewable tablet Take 81 mg by mouth daily.  co-enzyme Q-10 (CO Q-10) 100 mg capsule Take 100 mg by mouth daily.  KRILL OIL PO Take  by mouth.  Alpha Lipoic Acid 200 mg Tab Take  by mouth.  MILK THISTLE, BULK, by Does Not Apply route.  CHOLECALCIFEROL, VITAMIN D3, (VITAMIN D3 PO) Take 2,000 Units by mouth daily. No current facility-administered medications for this visit. Vitals:    05/01/17 1630 05/01/17 1652   BP: (!) 158/98 135/80   Pulse: 92    Resp: 16    SpO2: 98%    Weight: 143 lb (64.9 kg)    Height: 5' 2\" (1.575 m)      S1 and S2 normal, no murmurs, clicks, gallops or rubs. Regular rate and rhythm. Chest is clear; no wheezes or rales. No edema or JVD.  abd tender epigastrium'  Tender and ROM reduced lower back and thoracic          Clydie was seen today for back pain and abdominal pain. Diagnoses and all orders for this visit:    Traumatic compression fracture of T8 thoracic vertebra, closed, initial encounter ([facility] [address]  -     MRI [facility] SPINE WO CONT; Future  -     MRI LUMB SPINE WO CONT; Future    Acute superficial gastritis without hemorrhage    Other orders  -     Omeprazole delayed release (PRILOSEC D/R) 20 mg tablet; Take 1 Tab by mouth daily.  -     HYDROcodone-acetaminophen (NORCO) 7.5-325 mg per tablet; Take 1 Tab by mouth every six (6) hours as needed for Pain. Max Daily Amount: 4 Tabs.       Try ppi and short course narcotics  Get MRI to clarify back issue  Stop nsaids and aspirin

## 2017-05-01 NOTE — MR AVS SNAPSHOT
Visit Information Date & Time Provider Department Dept. Phone Encounter #  
 5/1/2017  4:30 PM Sami Diaz, [address] [facility] [phone] [id number] Upcoming Health Maintenance Date Due COLONOSCOPY 12/10/1969 BREAST CANCER SCRN MAMMOGRAM 12/10/2001 DTaP/Tdap/Td series (1 - Tdap) 1/3/2008 ZOSTER VACCINE AGE 60> 12/10/2011 GLAUCOMA SCREENING Q2Y 12/10/2016 Pneumococcal 65+ Low/Medium Risk (2 of 2 - PPSV23) 12/10/2016 MEDICARE YEARLY EXAM 12/10/2016 INFLUENZA AGE 9 TO ADULT 8/1/2017 Allergies as of 5/1/2017  Review Complete On: 5/1/2017 By: Marcelle Champagne LPN Severity Noted Reaction Type Reactions Keflex [Cephalexin]  09/28/2016    Angioedema Current Immunizations  Reviewed on 11/22/2016 Name Date Influenza Vaccine (Quad) PF 11/25/2014 Pneumococcal Vaccine (Unspecified Type) 9/29/2009, 1/2/2008 TD Vaccine 1/2/2008 Not reviewed this visit You Were Diagnosed With   
  
 Codes Comments Traumatic compression fracture of T8 thoracic vertebra, closed, initial encounter ([facility] [address]    -  Primary ICD-10-CM: X82.280E ICD-9-CM: 360. 2 Vitals BP Pulse Resp Height(growth percentile) Weight(growth percentile) SpO2  
 135/80 92 16 5' 2\" (1.575 m) 143 lb (64.9 kg) 98% BMI OB Status Smoking Status 26.16 kg/m2 Postmenopausal Current Every Day Smoker Vitals History BMI and BSA Data Body Mass Index Body Surface Area  
 26.16 kg/m 2 1.68 m 2 Preferred Pharmacy Pharmacy Name Phone [phone] [phone] Your Updated Medication List  
  
   
This list is accurate as of: 5/1/17  5:05 PM.  Always use your most recent med list.  
  
  
  
  
 alendronate 70 mg tablet Commonly known as:  FOSAMAX Take 1 Tab by mouth every seven (7) days. Alpha Lipoic Acid 200 mg Tab Take  by mouth. aspirin 81 mg chewable tablet Take 81 mg by mouth daily. atorvastatin 10 mg tablet Commonly known as:  LIPITOR Take 0.5 Tabs by mouth daily. b complex vitamins tablet Take 1 Tab by mouth daily. buPROPion  mg SR tablet Commonly known as:  WELLBUTRIN SR  
TAKE 1 TAB BY MOUTH TWO (2) TIMES A DAY. calcium 500 mg Tab Take 500 mg by mouth daily. chlorpheniramine-HYDROcodone 10-8 mg/5 mL suspension Commonly known as:  Arlis Specking Take 5 mL by mouth every twelve (12) hours as needed for Cough. Max Daily Amount: 10 mL. CO Q-10 100 mg capsule Generic drug:  co-enzyme Q-10 Take 100 mg by mouth daily. * felodipine 5 mg 24 hr tablet Commonly known as:  PLENDIL SR Take 1 Tab by mouth daily. * felodipine 5 mg 24 hr tablet Commonly known as:  PLENDIL SR  
TAKE 1 TABLET BY MOUTH DAILY. * HYDROcodone-acetaminophen 5-325 mg per tablet Commonly known as:  Maryfrances Grumbles Take 1 Tab by mouth every six (6) hours as needed for Pain. Max Daily Amount: 4 Tabs. * HYDROcodone-acetaminophen 7.5-325 mg per tablet Commonly known as:  Maryfrances Grumbles Take 1 Tab by mouth every six (6) hours as needed for Pain. Max Daily Amount: 4 Tabs. KRILL OIL PO Take  by mouth.  
  
 levoFLOXacin 750 mg tablet Commonly known as:  Bridger [place] Take 1 Tab by mouth daily. magnesium oxide 500 mg Tab Take 500 mg by mouth daily. MILK THISTLE (BULK)  
by Does Not Apply route. Omeprazole delayed release 20 mg tablet Commonly known as:  PRILOSEC D/R Take 1 Tab by mouth daily. ondansetron 4 mg disintegrating tablet Commonly known as:  ZOFRAN ODT Take 1 Tab by mouth every eight (8) hours as needed for Nausea. VITAMIN D3 PO Take 2,000 Units by mouth daily. * Notice: This list has 4 medication(s) that are the same as other medications prescribed for you.  Read the directions carefully, and ask your doctor or other care provider to review them with you. Prescriptions Printed Refills Omeprazole delayed release (PRILOSEC D/R) 20 mg tablet 0 Sig: Take 1 Tab by mouth daily. Class: Print Route: Oral  
 HYDROcodone-acetaminophen (NORCO) 7.5-325 mg per tablet 0 Sig: Take 1 Tab by mouth every six (6) hours as needed for Pain. Max Daily Amount: 4 Tabs. Class: Print Route: Oral  
  
To-Do List   
 05/01/2017 Imaging:  MRI LUMB SPINE WO CONT   
  
 05/01/2017 Imaging:  MRI [facility] SPINE WO CONT   
  
 05/22/2017 10:30 AM  
  Appointment with [facility] PAT EXAM RM 1 at [place] [address] ([phone]) Introducing [facility] SERVICES! Bill Landa introduces [employer] patient portal. Now you can access parts of your medical record, email your doctor's office, and request medication refills online. 1. In your internet browser, go to [email or web address] 2. Click on the First Time User? Click Here link in the Sign In box. You will see the New Member Sign Up page. 3. Enter your [employer] Access Code exactly as it appears below. You will not need to use this code after youve completed the sign-up process. If you do not sign up before the expiration date, you must request a new code. · [employer] Access Code: [email or web address] Expires: 7/17/2017  3:11 PM 
 
4. Enter the last four digits of your Social Security Number (xxxx) and Date of Birth (mm/dd/yyyy) as indicated and click Submit. You will be taken to the next sign-up page. 5. Create a [employer] ID. This will be your [employer] login ID and cannot be changed, so think of one that is secure and easy to remember. 6. Create a [employer] password. You can change your password at any time. 7. Enter your Password Reset Question and Answer. This can be used at a later time if you forget your password. 8. Enter your e-mail address.  You will receive e-mail notification when new information is available in [employer]. 9. Click Sign Up. You can now view and download portions of your medical record. 10. Click the Download Summary menu link to download a portable copy of your medical information. If you have questions, please visit the Frequently Asked Questions section of the [employer] website. Remember, [employer] is NOT to be used for urgent needs. For medical emergencies, dial 911. Now available from your iPhone and Android! Please provide this summary of care documentation to your next provider. Your primary care clinician is listed as Lizzy Davalos. If you have any questions after today's visit, please call [phone].

## 2017-05-09 ENCOUNTER — HOSPITAL ENCOUNTER (OUTPATIENT)
Dept: MRI IMAGING | Age: 67
Discharge: HOME | End: 2017-05-09
Attending: INTERNAL MEDICINE

## 2017-05-09 DIAGNOSIS — S22.060A TRAUMATIC COMPRESSION FRACTURE OF T8 THORACIC VERTEBRA, CLOSED, INITIAL ENCOUNTER (HCC): ICD-10-CM

## 2017-05-10 ENCOUNTER — TELEPHONE (OUTPATIENT)
Dept: INTERNAL MEDICINE CLINIC | Age: 67
End: 2017-05-10

## 2017-05-10 RX ORDER — LORAZEPAM 1 MG/1
TABLET ORAL
Qty: 1 TAB | Refills: 0 | Status: SHIPPED | OUTPATIENT
Start: 2017-05-10 | End: 2017-05-22 | Stop reason: SDUPTHER

## 2017-05-10 NOTE — TELEPHONE ENCOUNTER
Left detailed message to inform the patient that Dr Patricia Goodman has ordered the med as requested. Since it is a sedative she needs a  to take her to and from the MRI. Asked her for a return call to the office with any further questions.

## 2017-05-10 NOTE — TELEPHONE ENCOUNTER
Patient states she had a MRI scheduled for yesterday and she didn't realize that it was an enclosed space, so she rescheduled her appointment for next Monday (05.15.17). Patient would like to know if she could get something to help her relax/relieve her anxiety? Patient can be reached at [phone] (home) [phone] (work)    Thank you!

## 2017-05-15 ENCOUNTER — HOSPITAL ENCOUNTER (OUTPATIENT)
Dept: MRI IMAGING | Age: 67
Discharge: HOME | End: 2017-05-15
Attending: INTERNAL MEDICINE
Payer: MEDICARE

## 2017-05-15 PROCEDURE — 72148 MRI LUMBAR SPINE W/O DYE: CPT

## 2017-05-15 PROCEDURE — 72146 MRI CHEST SPINE W/O DYE: CPT

## 2017-05-16 ENCOUNTER — TELEPHONE (OUTPATIENT)
Dept: INTERNAL MEDICINE CLINIC | Age: 67
End: 2017-05-16

## 2017-05-16 DIAGNOSIS — G95.89 SPINAL CORD MASS (HCC): Primary | ICD-10-CM

## 2017-05-17 NOTE — PROGRESS NOTES
Mass at T7  And   Age related degenerative disease other areas  Advice is  Repeat MRI  T7 with contrast to see if a tumor( ordered)    Make appointment with neurosurgery for opinion

## 2017-05-18 NOTE — PROGRESS NOTES
Notified the patient per Dr Ivan Ford of the following: There is a mass at T7 and age related degenerative disease in other areas. Advice is to repeat MRI of T7 with contrast to see if a tumor. She will receive a call from the [facility] [place] . Following the new MRI, make an appointment with neurosurgery for their opinion. She states understanding and agrees with the treatment plan.

## 2017-05-22 ENCOUNTER — TELEPHONE (OUTPATIENT)
Dept: INTERNAL MEDICINE CLINIC | Age: 67
End: 2017-05-22

## 2017-05-22 RX ORDER — LORAZEPAM 1 MG/1
TABLET ORAL
Qty: 1 TAB | Refills: 0 | Status: SHIPPED | OUTPATIENT
Start: 2017-05-22 | End: 2018-02-21

## 2017-05-24 ENCOUNTER — HOSPITAL ENCOUNTER (OUTPATIENT)
Dept: MRI IMAGING | Age: 67
Discharge: HOME | End: 2017-05-24
Attending: INTERNAL MEDICINE
Payer: MEDICARE

## 2017-05-24 VITALS — BODY MASS INDEX: 26.52 KG/M2 | WEIGHT: 145 LBS

## 2017-05-24 DIAGNOSIS — G95.89 SPINAL CORD MASS (HCC): ICD-10-CM

## 2017-05-24 LAB — CREAT BLD-MCNC: 0.7 MG/DL (ref 0.6–1.3)

## 2017-05-24 PROCEDURE — 72157 MRI CHEST SPINE W/O & W/DYE: CPT

## 2017-05-24 PROCEDURE — 82565 ASSAY OF CREATININE: CPT

## 2017-05-24 PROCEDURE — A9576 INJ PROHANCE MULTIPACK: HCPCS | Performed by: INTERNAL MEDICINE

## 2017-05-24 PROCEDURE — 74011250636 HC RX REV CODE- 250/636: Performed by: INTERNAL MEDICINE

## 2017-05-24 RX ADMIN — GADOTERIDOL 13 ML: 279.3 INJECTION, SOLUTION INTRAVENOUS at 08:00

## 2017-05-25 NOTE — PROGRESS NOTES
She has a benign cyst seen off spine at T8 level was there   Previously in 2008    Can get opinion from neurosurgery on this

## 2017-05-25 NOTE — PROGRESS NOTES
Spoke with the patient using 2 identifiers. Notified her per Dr Marta Bean that she has a benign cyst seen off spine at T8 level which was there previously in 2008. Can get opinion from neurosurgery on this. She is asking for a recommendation to a neurosurgeon. In addition, she is requesting more pain pills. Please advise.

## 2017-05-30 ENCOUNTER — TELEPHONE (OUTPATIENT)
Dept: INTERNAL MEDICINE CLINIC | Age: 67
End: 2017-05-30

## 2017-09-01 RX ORDER — FELODIPINE 5 MG/1
TABLET, EXTENDED RELEASE ORAL
Qty: 90 TAB | Refills: 1 | Status: SHIPPED | OUTPATIENT
Start: 2017-09-01 | End: 2018-02-16 | Stop reason: SDUPTHER

## 2017-10-29 RX ORDER — BUPROPION HYDROCHLORIDE 150 MG/1
TABLET, EXTENDED RELEASE ORAL
Qty: 180 TAB | Refills: 1 | Status: SHIPPED | OUTPATIENT
Start: 2017-10-29 | End: 2019-01-09 | Stop reason: SDUPTHER

## 2017-11-05 RX ORDER — BUPROPION HYDROCHLORIDE 150 MG/1
TABLET, EXTENDED RELEASE ORAL
Qty: 180 TAB | Refills: 1 | Status: SHIPPED | OUTPATIENT
Start: 2017-11-05 | End: 2018-08-28 | Stop reason: SDUPTHER

## 2017-12-13 ENCOUNTER — OFFICE VISIT (OUTPATIENT)
Dept: INTERNAL MEDICINE CLINIC | Age: 67
End: 2017-12-13

## 2017-12-13 VITALS
WEIGHT: 149 LBS | TEMPERATURE: 97.9 F | HEIGHT: 62 IN | DIASTOLIC BLOOD PRESSURE: 80 MMHG | HEART RATE: 86 BPM | OXYGEN SATURATION: 99 % | RESPIRATION RATE: 16 BRPM | SYSTOLIC BLOOD PRESSURE: 127 MMHG | BODY MASS INDEX: 27.42 KG/M2

## 2017-12-13 DIAGNOSIS — Z72.0 TOBACCO ABUSE: ICD-10-CM

## 2017-12-13 DIAGNOSIS — M81.0 OSTEOPOROSIS, UNSPECIFIED OSTEOPOROSIS TYPE, UNSPECIFIED PATHOLOGICAL FRACTURE PRESENCE: ICD-10-CM

## 2017-12-13 DIAGNOSIS — Z12.31 ENCOUNTER FOR SCREENING MAMMOGRAM FOR MALIGNANT NEOPLASM OF BREAST: ICD-10-CM

## 2017-12-13 DIAGNOSIS — Z00.00 MEDICARE ANNUAL WELLNESS VISIT, SUBSEQUENT: ICD-10-CM

## 2017-12-13 DIAGNOSIS — Z12.2 ENCOUNTER FOR SCREENING FOR LUNG CANCER: ICD-10-CM

## 2017-12-13 DIAGNOSIS — Z12.11 COLON CANCER SCREENING: ICD-10-CM

## 2017-12-13 DIAGNOSIS — I10 ESSENTIAL HYPERTENSION: Primary | ICD-10-CM

## 2017-12-13 DIAGNOSIS — Z12.39 BREAST SCREENING: ICD-10-CM

## 2017-12-13 RX ORDER — PRAVASTATIN SODIUM 20 MG/1
20 TABLET ORAL
Qty: 90 TAB | Refills: 3 | Status: SHIPPED | OUTPATIENT
Start: 2017-12-13 | End: 2018-05-14 | Stop reason: ALTCHOICE

## 2017-12-13 NOTE — PROGRESS NOTES
Thom Purcell is a 78 y.o. female with the following Problems and Medications. Patient Active Problem List   Diagnosis Code    Hypertension I10    Raynaud's disease I73.00    Depression F32.9    Osteoporosis M81.0    HLD (hyperlipidemia) E78.5    Degenerative disc disease [id number]    Lipoma of back D17.1    Aortic calcification (HCC) I70.0    Hepatitis C antibody test positive R76.8    Tobacco abuse Z72.0     Current Outpatient Prescriptions   Medication Sig Dispense Refill    pravastatin (PRAVACHOL) 20 mg tablet Take 1 Tab by mouth nightly. Indications: primary prevention of coronary heart disease 90 Tab 3    buPROPion SR (WELLBUTRIN SR) 150 mg SR tablet TAKE 1 TABLET BY MOUTH TWICE A  Tab 1    felodipine (PLENDIL SR) 5 mg 24 hr tablet Take 1 Tab by mouth daily. 90 Tab 0    aspirin 81 mg chewable tablet Take 81 mg by mouth daily.  buPROPion SR (WELLBUTRIN SR) 150 mg SR tablet TAKE 1 TABLET BY MOUTH TWICE A  Tab 1    felodipine (PLENDIL SR) 5 mg 24 hr tablet TAKE 1 TABLET BY MOUTH DAILY. 90 Tab 1    LORazepam (ATIVAN) 1 mg tablet One po 30 min before MRI 1 Tab 0    Omeprazole delayed release (PRILOSEC D/R) 20 mg tablet Take 1 Tab by mouth daily. 30 Tab 0    chlorpheniramine-HYDROcodone (TUSSIONEX) 10-8 mg/5 mL suspension Take 5 mL by mouth every twelve (12) hours as needed for Cough. Max Daily Amount: 10 mL. 115 mL 0    calcium 500 mg tab Take 500 mg by mouth daily.  b complex vitamins tablet Take 1 Tab by mouth daily.  magnesium oxide 500 mg tab Take 500 mg by mouth daily.  co-enzyme Q-10 (CO Q-10) 100 mg capsule Take 100 mg by mouth daily.  KRILL OIL PO Take  by mouth.  Alpha Lipoic Acid 200 mg Tab Take  by mouth.  MILK THISTLE, BULK, by Does Not Apply route.  CHOLECALCIFEROL, VITAMIN D3, (VITAMIN D3 PO) Take 2,000 Units by mouth daily.          SUBJECTIVE: Cleveland Ceja is a 78 y.o. female seen for a follow up visit; she has hypertension, hyperlipidemia and calcium on scans. Current Outpatient Prescriptions   Medication Sig Dispense Refill    pravastatin (PRAVACHOL) 20 mg tablet Take 1 Tab by mouth nightly. Indications: primary prevention of coronary heart disease 90 Tab 3    buPROPion SR (WELLBUTRIN SR) 150 mg SR tablet TAKE 1 TABLET BY MOUTH TWICE A  Tab 1    felodipine (PLENDIL SR) 5 mg 24 hr tablet Take 1 Tab by mouth daily. 90 Tab 0    aspirin 81 mg chewable tablet Take 81 mg by mouth daily.  buPROPion SR (WELLBUTRIN SR) 150 mg SR tablet TAKE 1 TABLET BY MOUTH TWICE A  Tab 1    felodipine (PLENDIL SR) 5 mg 24 hr tablet TAKE 1 TABLET BY MOUTH DAILY. 90 Tab 1    LORazepam (ATIVAN) 1 mg tablet One po 30 min before MRI 1 Tab 0    Omeprazole delayed release (PRILOSEC D/R) 20 mg tablet Take 1 Tab by mouth daily. 30 Tab 0    chlorpheniramine-HYDROcodone (TUSSIONEX) 10-8 mg/5 mL suspension Take 5 mL by mouth every twelve (12) hours as needed for Cough. Max Daily Amount: 10 mL. 115 mL 0    calcium 500 mg tab Take 500 mg by mouth daily.  b complex vitamins tablet Take 1 Tab by mouth daily.  magnesium oxide 500 mg tab Take 500 mg by mouth daily.  co-enzyme Q-10 (CO Q-10) 100 mg capsule Take 100 mg by mouth daily.  KRILL OIL PO Take  by mouth.  Alpha Lipoic Acid 200 mg Tab Take  by mouth.  MILK THISTLE, BULK, by Does Not Apply route.  CHOLECALCIFEROL, VITAMIN D3, (VITAMIN D3 PO) Take 2,000 Units by mouth daily.        Patient Active Problem List   Diagnosis Code    Hypertension I10    Raynaud's disease I73.00    Depression F32.9    Osteoporosis M81.0    HLD (hyperlipidemia) E78.5    Degenerative disc disease [id number]    Lipoma of back D17.1    Aortic calcification (HCC) I70.0    Hepatitis C antibody test positive R76.8    Tobacco abuse Z72.0     System Review: Cardiovascular ROS - taking medications as instructed, no medication side effects noted, patient does not perform home BP monitoring, no TIA's, no chest pain on exertion, no dyspnea on exertion, no swelling of ankles, vague leg cramps from statin, CNS ROS - no TIA or stroke-like symptoms, no amaurosis, diplopia, abnormal speech, unilateral numbness or weakness. New concerns: off fosamax had gerd  Stopped caltrate  Denies chest pain oicc back pain   No dyspnea. OBJECTIVE:  Visit Vitals    /80    Pulse 86    Temp 97.9 °F (36.6 °C) (Oral)    Resp 16    Ht 5' 2\" (1.575 m)    Wt 149 lb (67.6 kg)    SpO2 99%    BMI 27.25 kg/m2      Appearance: alert, well appearing, and in no distress, oriented to person, place, and time and normal appearing weight. General exam: CVS exam BP noted to be well controlled today in office, S1, S2 normal, no gallop, no murmur, chest clear, no JVD, no HSM, no edema. Lab review: orders written for new lab studies as appropriate; see orders. ASSESSMENT:  hypertension stable, hyperlipidemia borderline controlled, may need VIT D  May need different statin, does not want IV for  osteoporosis. PLAN:  current treatment plan is effective, no change in therapy  reviewed diet, exercise and weight control  recommended sodium restriction  very strongly urged to quit smoking to reduce cardiovascular risk. 1. Essential hypertension  controlled  - CBC WITH AUTOMATED DIFF  - LIPID PANEL  - METABOLIC PANEL, COMPREHENSIVE    2. Medicare annual wellness visit, subsequent    - [facility] MAMMO BI SCREENING INCL CAD; Future    3. Encounter for screening mammogram for malignant neoplasm of breast    - Bilateral Digital Screening Mammography; Future    4. Colon cancer screening  Refuses colonoscopy  - OCCULT BLOOD, IMMUNOASSAY (FIT)    5. Osteoporosis, unspecified osteoporosis type, unspecified pathological fracture presence  No drugs  - VITAMIN D, 25 HYDROXY; Future    6. Tobacco abuse  counseled    7.  Encounter for screening for lung cancer  She is a candidate for this test  - CT LOW DOSE LUNG CANCER SCREENING; Future    8. Breast screening      Advance Care Planning (ACP) Provider Conversation Snapshot    Date of ACP Conversation: 12/13/17  Persons included in Conversation:  patient  Length of ACP Conversation in minutes:  <16 minutes (Non-Billable)    Authorized Decision Maker (if patient is incapable of making informed decisions):    This person is:   Healthcare Agent/Medical Power of  under Advance Directive          For Patients with Decision Making Capacity:   Values/Goals: Exploration of values, goals, and preferences if recovery is not expected, even with continued medical treatment in the event of:  Imminent death    Conversation Outcomes / Follow-Up Plan:   Referral made for ACP follow-up assistance to:  other provider  nurse

## 2017-12-13 NOTE — PATIENT INSTRUCTIONS

## 2017-12-13 NOTE — PROGRESS NOTES
Coordination of Care Questions    1. Have you been to the ER, urgent care clinic since your last visit? No       Hospitalized since your last visit? No    2. Have you seen or consulted any other health care providers outside of the [address] [place] [address] since your last visit? Include any pap smears or colon screening.  No

## 2017-12-13 NOTE — MR AVS SNAPSHOT
Visit Information Date & Time Provider Department Dept. Phone Encounter #  
 12/13/2017  9:15 AM Dorita Welch MD [facility] [phone] [id number] Upcoming Health Maintenance Date Due COLONOSCOPY 12/10/1969 DTaP/Tdap/Td series (1 - Tdap) 1/3/2008 ZOSTER VACCINE AGE 60> 10/10/2011 GLAUCOMA SCREENING Q2Y 12/10/2016 MEDICARE YEARLY EXAM 12/10/2016 Pneumococcal 65+ Low/Medium Risk (2 of 2 - PPSV23) 11/1/2018 Allergies as of 12/13/2017  Review Complete On: 12/13/2017 By: Manjula Huff LPN Severity Noted Reaction Type Reactions Fosamax [Alendronate]  12/13/2017    Nausea Only Keflex [Cephalexin]  09/28/2016    Angioedema Current Immunizations  Reviewed on 12/13/2017 Name Date Influenza Vaccine (Quad) PF 11/1/2017, 11/25/2014 Pneumococcal Conjugate (PCV-13) 11/1/2017 Pneumococcal Vaccine (Unspecified Type) 9/29/2009 TD Vaccine 1/2/2008 ZZZ-RETIRED (DO NOT USE) Pneumococcal Vaccine (Unspecified Type) 1/2/2008 Reviewed by Manjula Huff LPN on 20/04/3487 at  9:26 AM  
You Were Diagnosed With   
  
 Codes Comments Essential hypertension    -  Primary ICD-10-CM: I10 
ICD-9-CM: 401.9 Medicare annual wellness visit, subsequent     ICD-10-CM: Z00.00 ICD-9-CM: V70.0 Encounter for screening mammogram for malignant neoplasm of breast     ICD-10-CM: Z12.31 
ICD-9-CM: V76.12 Colon cancer screening     ICD-10-CM: Z12.11 ICD-9-CM: V76.51 Osteoporosis, unspecified osteoporosis type, unspecified pathological fracture presence     ICD-10-CM: M81.0 ICD-9-CM: 733.00 Tobacco abuse     ICD-10-CM: Z72.0 ICD-9-CM: 305.1 Encounter for screening for lung cancer     ICD-10-CM: Z12.2 ICD-9-CM: V76.0 Breast screening     ICD-10-CM: Z12.31 
ICD-9-CM: V76.10 Vitals BP Pulse Temp Resp Height(growth percentile) Weight(growth percentile) 127/80 86 97.9 °F (36.6 °C) (Oral) 16 5' 2\" (1.575 m) 149 lb (67.6 kg) SpO2 BMI OB Status Smoking Status 99% 27.25 kg/m2 Postmenopausal Current Every Day Smoker Vitals History BMI and BSA Data Body Mass Index Body Surface Area  
 27.25 kg/m 2 1.72 m 2 Preferred Pharmacy Pharmacy Name Phone [id number]- [place] [address] [phone] Your Updated Medication List  
  
   
This list is accurate as of: 12/13/17 10:11 AM.  Always use your most recent med list.  
  
  
  
  
 Alpha Lipoic Acid 200 mg Tab Take  by mouth. aspirin 81 mg chewable tablet Take 81 mg by mouth daily. b complex vitamins tablet Take 1 Tab by mouth daily. * buPROPion  mg SR tablet Commonly known as:  WELLBUTRIN SR  
TAKE 1 TABLET BY MOUTH TWICE A DAY * buPROPion  mg SR tablet Commonly known as:  WELLBUTRIN SR  
TAKE 1 TABLET BY MOUTH TWICE A DAY  
  
 calcium 500 mg Tab Take 500 mg by mouth daily. chlorpheniramine-HYDROcodone 10-8 mg/5 mL suspension Commonly known as:  Leela Pe Ell Take 5 mL by mouth every twelve (12) hours as needed for Cough. Max Daily Amount: 10 mL. CO Q-10 100 mg capsule Generic drug:  co-enzyme Q-10 Take 100 mg by mouth daily. * felodipine 5 mg 24 hr tablet Commonly known as:  PLENDIL SR Take 1 Tab by mouth daily. * felodipine 5 mg 24 hr tablet Commonly known as:  PLENDIL SR  
TAKE 1 TABLET BY MOUTH DAILY. * HYDROcodone-acetaminophen 5-325 mg per tablet Commonly known as:  Brice Songster Take 1 Tab by mouth every six (6) hours as needed for Pain. Max Daily Amount: 4 Tabs. * HYDROcodone-acetaminophen 7.5-325 mg per tablet Commonly known as:  Brice Songster Take 1 Tab by mouth every six (6) hours as needed for Pain. Max Daily Amount: 4 Tabs. KRILL OIL PO Take  by mouth.  
  
 levoFLOXacin 750 mg tablet Commonly known as:  Amish Samuels  
 Take 1 Tab by mouth daily. LORazepam 1 mg tablet Commonly known as:  ATIVAN One po 30 min before MRI  
  
 magnesium oxide 500 mg Tab Take 500 mg by mouth daily. MILK THISTLE (BULK)  
by Does Not Apply route. Omeprazole delayed release 20 mg tablet Commonly known as:  PRILOSEC D/R Take 1 Tab by mouth daily. pravastatin 20 mg tablet Commonly known as:  PRAVACHOL Take 1 Tab by mouth nightly. Indications: primary prevention of coronary heart disease VITAMIN D3 PO Take 2,000 Units by mouth daily. * Notice: This list has 6 medication(s) that are the same as other medications prescribed for you. Read the directions carefully, and ask your doctor or other care provider to review them with you. Prescriptions Printed Refills  
 pravastatin (PRAVACHOL) 20 mg tablet 3 Sig: Take 1 Tab by mouth nightly. Indications: primary prevention of coronary heart disease Class: Print Route: Oral  
  
We Performed the Following CBC WITH AUTOMATED DIFF [96828 CPT(R)] LIPID PANEL [66206 CPT(R)] METABOLIC PANEL, COMPREHENSIVE [47236 CPT(R)] OCCULT BLOOD, IMMUNOASSAY (FIT) [id number] CPT(R)] To-Do List   
 12/13/2017 Imaging:  CT LOW DOSE LUNG CANCER SCREENING   
  
 12/13/2017 Imaging:  [facility] MAMMO BI SCREENING INCL CAD   
  
 12/13/2017 Lab:  VITAMIN D, 25 HYDROXY   
  
 12/16/2017 Imaging:  [facility] MAMMO BI SCREENING INCL CAD Patient Instructions Medicare Wellness Visit, Female The best way to live healthy is to have a healthy lifestyle by eating a well-balanced diet, exercising regularly, limiting alcohol and stopping smoking. Regular physical exams and screening tests are another way to keep healthy. Preventive exams provided by your health care provider can find health problems before they become diseases or illnesses.  Preventive services including immunizations, screening tests, monitoring and exams can help you take care of your own health. All people over age 72 should have a pneumovax  and and a prevnar shot to prevent pneumonia. These are once in a lifetime unless you and your provider decide differently. All people over 65 should have a yearly flu shot and a tetanus vaccine every 10 years. A bone mass density to screen for osteoporosis or thinning of the bones should be done every 2 years after 65. Screening for diabetes mellitus with a blood sugar test should be done every year. Glaucoma is a disease of the eye due to increased ocular pressure that can lead to blindness and it should be done every year by an eye professional. 
 
Cardiovascular screening tests that check for elevated lipids (fatty part of blood) which can lead to heart disease and strokes should be done every 5 years. Colorectal screening that evaluates for blood or polyps in your colon should be done yearly as a stool test or every five years as a flexible sigmoidoscope or every 10 years as a colonoscopy up to age 76. Breast cancer screening with a mammogram is recommended biennially  for women age 54-69. Screening for cervical cancer with a pap smear and pelvic exam is recommended for women after age 72 years every 2 years up to age 79 or when the provider and patient decide to stop. If there is a history of cervical abnormalities or other increased risk for cancer then the test is recommended yearly. Hepatitis C screening is also recommended for anyone born between [birth year] through [address]. A shingles vaccine is also recommended once in a lifetime after age 61. Your Medicare Wellness Exam is recommended annually. Here is a list of your current Health Maintenance items with a due date: 
Health Maintenance Due Topic Date Due  
 Colonoscopy  12/10/1969  
 DTaP/Tdap/Td  (1 - Tdap) 01/03/2008  Shingles Vaccine  10/10/2011  Glaucoma Screening   12/10/2016 Nathalia Kaplan Annual Well Visit  12/10/2016 Please provide this summary of care documentation to your next provider. Your primary care clinician is listed as Galeano Loser. If you have any questions after today's visit, please call [phone].

## 2018-02-18 RX ORDER — FELODIPINE 5 MG/1
TABLET, EXTENDED RELEASE ORAL
Qty: 90 TAB | Refills: 1 | Status: SHIPPED | OUTPATIENT
Start: 2018-02-18 | End: 2018-08-17 | Stop reason: SDUPTHER

## 2018-02-21 ENCOUNTER — HOSPITAL ENCOUNTER (EMERGENCY)
Age: 68
Discharge: HOME | End: 2018-02-21
Attending: EMERGENCY MEDICINE
Payer: MEDICARE

## 2018-02-21 ENCOUNTER — APPOINTMENT (OUTPATIENT)
Dept: GENERAL RADIOLOGY | Age: 68
End: 2018-02-21
Attending: EMERGENCY MEDICINE
Payer: MEDICARE

## 2018-02-21 VITALS
SYSTOLIC BLOOD PRESSURE: 176 MMHG | RESPIRATION RATE: 18 BRPM | TEMPERATURE: 97.9 F | WEIGHT: 149.69 LBS | BODY MASS INDEX: 27.55 KG/M2 | HEART RATE: 91 BPM | OXYGEN SATURATION: 97 % | DIASTOLIC BLOOD PRESSURE: 96 MMHG | HEIGHT: 62 IN

## 2018-02-21 DIAGNOSIS — M54.6 ACUTE RIGHT-SIDED THORACIC BACK PAIN: Primary | ICD-10-CM

## 2018-02-21 PROCEDURE — 71101 X-RAY EXAM UNILAT RIBS/CHEST: CPT

## 2018-02-21 PROCEDURE — 74011250637 HC RX REV CODE- 250/637: Performed by: EMERGENCY MEDICINE

## 2018-02-21 PROCEDURE — 99283 EMERGENCY DEPT VISIT LOW MDM: CPT

## 2018-02-21 RX ORDER — IBUPROFEN 600 MG/1
600 TABLET ORAL
Status: COMPLETED | OUTPATIENT
Start: 2018-02-21 | End: 2018-02-21

## 2018-02-21 RX ORDER — IBUPROFEN 600 MG/1
600 TABLET ORAL
Qty: 20 TAB | Refills: 0 | Status: SHIPPED | OUTPATIENT
Start: 2018-02-21 | End: 2018-02-21

## 2018-02-21 RX ORDER — IBUPROFEN 600 MG/1
600 TABLET ORAL
Qty: 20 TAB | Refills: 0 | Status: SHIPPED | OUTPATIENT
Start: 2018-02-21 | End: 2018-10-09 | Stop reason: SDUPTHER

## 2018-02-21 RX ORDER — HYDROCODONE BITARTRATE AND ACETAMINOPHEN 5; 325 MG/1; MG/1
1 TABLET ORAL
Qty: 10 TAB | Refills: 0 | Status: SHIPPED | OUTPATIENT
Start: 2018-02-21 | End: 2018-08-21 | Stop reason: DRUGHIGH

## 2018-02-21 RX ADMIN — IBUPROFEN 600 MG: 600 TABLET, FILM COATED ORAL at 13:17

## 2018-02-21 NOTE — ED TRIAGE NOTES
Pt states that she was folding laundry two hours ago and reached forward and felt sudden onset of sharp pain on the right mid back that radiates to the front.

## 2018-02-21 NOTE — ED PROVIDER NOTES
HPI Comments: 78-year-old white female presents to the emergency department with flank pain and back pain. Patient reports about an hour ago she was reaching for something for the laundry and felt a pop in her right mid back. The pain is over the right lower posterior ribs. She states that the area currently hurts. The pain does not radiate. The pain is mild in severity. Patient did not take any medications prior to arrival. Patient is concerned she might have fractured a rib given her history of osteoporosis. No shortness of breath. No chest pain. No abdominal pain. No vomiting or diarrhea. The history is provided by the patient. Past Medical History:   Diagnosis Date    Aortic calcification ([address] 2014    Degenerative disc disease 2012    neck    Hepatitis C antibody test positive 2015    Hypertension     Lipoma of back 6/15/2012    Mood swings (HCC)     mood swings with menopause    Osteoporosis 2010    Raynaud's disease        Past Surgical History:   Procedure Laterality Date    HX  SECTION  [id number]     x 3    HX CHOLECYSTECTOMY  1974         Family History:   Problem Relation Age of Onset    Parkinsonism Mother     Hypertension Father     Diabetes Father     Heart Disease Father 61            Social History     Social History    Marital status:      Spouse name: N/A    Number of children: N/A    Years of education: N/A     Occupational History    Not on file.      Social History Main Topics    Smoking status: Current Every Day Smoker     Packs/day: 0.50     Years: 25.00     Types: Cigarettes    Smokeless tobacco: Former User     Quit date: 2014    Alcohol use 1.5 oz/week     3 Glasses of wine per week      Comment: occasionally    Drug use: No    Sexual activity: Not Currently     Other Topics Concern    Not on file     Social History Narrative         ALLERGIES: Fosamax [alendronate] and Keflex [cephalexin]    Review of Systems   Constitutional: Negative for fever. HENT: Negative for facial swelling and nosebleeds. Eyes: Negative for pain. Respiratory: Negative for cough, chest tightness and shortness of breath. Cardiovascular: Negative for chest pain and leg swelling. Gastrointestinal: Negative for abdominal pain and vomiting. Endocrine: Negative for polyuria. Genitourinary: Positive for flank pain. Negative for difficulty urinating and hematuria. Musculoskeletal: Positive for back pain. Skin: Negative for color change. Allergic/Immunologic: Negative for immunocompromised state. Neurological: Negative for dizziness and headaches. Hematological: Does not bruise/bleed easily. All other systems reviewed and are negative. Vitals:    02/21/18 1232 02/21/18 1237 02/21/18 1248   BP: 150/89 192/85 150/89   Pulse:  91    Resp:  20    Temp:  97.9 °F (36.6 °C)    SpO2:  96%    Weight:  67.9 kg (149 lb 11.1 oz)    Height:  5' 2\" (1.575 m)             Physical Exam   Constitutional: She is oriented to person, place, and time. She appears well-developed and well-nourished. HENT:   Head: Normocephalic and atraumatic. Right Ear: External ear normal.   Left Ear: External ear normal.   Nose: Nose normal.   Mouth/Throat: Oropharynx is clear and moist.   Eyes: EOM are normal. Pupils are equal, round, and reactive to light. No scleral icterus. Neck: Normal range of motion. Neck supple. No JVD present. No tracheal deviation present. No thyromegaly present. Cardiovascular: Normal rate, regular rhythm, normal heart sounds and intact distal pulses. Exam reveals no friction rub. No murmur heard. Pulmonary/Chest: Effort normal and breath sounds normal. No stridor. No respiratory distress. She has no wheezes. She has no rales. She exhibits no tenderness. Abdominal: Soft. Bowel sounds are normal. She exhibits no distension. There is no tenderness. There is no rebound and no guarding.    Musculoskeletal: Normal range of motion. She exhibits tenderness. She exhibits no edema or deformity. Mild pain in right mid back to palpation over right lower ribs, no crepitus   Lymphadenopathy:     She has no cervical adenopathy. Neurological: She is alert and oriented to person, place, and time. She has normal reflexes. No cranial nerve deficit. Coordination normal.   Skin: Skin is warm and dry. No rash noted. No erythema. Psychiatric: She has a normal mood and affect. Her behavior is normal. Judgment and thought content normal.   Nursing note and vitals reviewed. MDM  Number of Diagnoses or Management Options  Diagnosis management comments: Patient has pain to palpation in the right mid back. Pain seems to be muscular. We'll check x-rays to rule out fracture. If the x-rays are negative, we'll treat with pain medications, anti-inflammatories. Patient agrees with this plan. Amount and/or Complexity of Data Reviewed  Tests in the radiology section of CPT®: ordered and reviewed  Decide to obtain previous medical records or to obtain history from someone other than the patient: yes  Review and summarize past medical records: yes  Independent visualization of images, tracings, or specimens: yes          ED Course       Procedures  Xrays of the ribs and chest are negative    Home with Motrin, Norco prn    Apply ice to affected area 20 min on and 20 min off    Return for any worsening symptoms including chest pain, shortness of breath, vomiting or fevers. Drink plenty of fluids. Please call your primary doctor for follow up within the next 24 hours.

## 2018-02-21 NOTE — DISCHARGE INSTRUCTIONS

## 2018-05-14 DIAGNOSIS — E78.5 HYPERLIPIDEMIA, UNSPECIFIED HYPERLIPIDEMIA TYPE: ICD-10-CM

## 2018-05-14 RX ORDER — ATORVASTATIN CALCIUM 10 MG/1
TABLET, FILM COATED ORAL
Qty: 90 TAB | Refills: 3 | Status: SHIPPED | OUTPATIENT
Start: 2018-05-14 | End: 2019-08-10 | Stop reason: SDUPTHER

## 2018-08-19 RX ORDER — FELODIPINE 5 MG/1
TABLET, EXTENDED RELEASE ORAL
Qty: 90 TAB | Refills: 1 | Status: SHIPPED | OUTPATIENT
Start: 2018-08-19 | End: 2020-05-11 | Stop reason: SDUPTHER

## 2018-08-21 ENCOUNTER — OFFICE VISIT (OUTPATIENT)
Dept: INTERNAL MEDICINE CLINIC | Age: 68
End: 2018-08-21

## 2018-08-21 VITALS
HEIGHT: 62 IN | WEIGHT: 150.2 LBS | HEART RATE: 80 BPM | TEMPERATURE: 97.8 F | DIASTOLIC BLOOD PRESSURE: 80 MMHG | OXYGEN SATURATION: 99 % | RESPIRATION RATE: 18 BRPM | SYSTOLIC BLOOD PRESSURE: 121 MMHG | BODY MASS INDEX: 27.64 KG/M2

## 2018-08-21 DIAGNOSIS — E55.9 VITAMIN D DEFICIENCY: ICD-10-CM

## 2018-08-21 DIAGNOSIS — I10 ESSENTIAL HYPERTENSION: Primary | ICD-10-CM

## 2018-08-21 DIAGNOSIS — I10 ESSENTIAL HYPERTENSION: ICD-10-CM

## 2018-08-21 DIAGNOSIS — R76.8 HEPATITIS C ANTIBODY TEST POSITIVE: ICD-10-CM

## 2018-08-21 DIAGNOSIS — F34.1 DYSTHYMIA: ICD-10-CM

## 2018-08-21 DIAGNOSIS — G47.00 INSOMNIA, UNSPECIFIED TYPE: ICD-10-CM

## 2018-08-21 RX ORDER — FELODIPINE 5 MG/1
5 TABLET, EXTENDED RELEASE ORAL DAILY
Qty: 90 TAB | Refills: 1 | Status: SHIPPED | OUTPATIENT
Start: 2018-08-21 | End: 2018-08-28 | Stop reason: SDUPTHER

## 2018-08-21 NOTE — PROGRESS NOTES
Chief Complaint   Patient presents with    Follow Up Chronic Condition     BP meds      1. Have you been to the ER, urgent care clinic since your last visit? Hospitalized since your last visit? Broken rib     2. Have you seen or consulted any other health care providers outside of the [facility] since your last visit? Include any pap smears or colon screening. No     The patient  does not have a history of falls. I did complete a risk assessment. Abuse Screening Questionnaire 8/21/2018   Do you ever feel afraid of your partner? N   Are you in a relationship with someone who physically or mentally threatens you? N   Is it safe for you to go home?  Bry Schreiber

## 2018-08-21 NOTE — PROGRESS NOTES
Mikaela Soto is a 78 y.o. female with the following Problems and Medications. Patient Active Problem List   Diagnosis Code    Hypertension I10    Raynaud's disease I73.00    Depression F32.9    Osteoporosis M81.0    HLD (hyperlipidemia) E78.5    Degenerative disc disease [id number]    Lipoma of back D17.1    Aortic calcification (HCC) I70.0    Hepatitis C antibody test positive R76.8    Tobacco abuse Z72.0     Current Outpatient Prescriptions   Medication Sig Dispense Refill    felodipine (PLENDIL SR) 5 mg 24 hr tablet Take 1 Tab by mouth daily. 90 Tab 1    atorvastatin (LIPITOR) 10 mg tablet TAKE 1 TABLET BY MOUTH EVERY EVENING (Patient taking differently: TAKE 1 TABLET BY MOUTH EVERY two days) 90 Tab 3    buPROPion SR (WELLBUTRIN SR) 150 mg SR tablet TAKE 1 TABLET BY MOUTH TWICE A  Tab 1    aspirin 81 mg chewable tablet Take 81 mg by mouth daily.  felodipine (PLENDIL SR) 5 mg 24 hr tablet TAKE 1 TABLET BY MOUTH EVERY DAY 90 Tab 1    ibuprofen (MOTRIN) 600 mg tablet Take 1 Tab by mouth every six (6) hours as needed for Pain. 20 Tab 0    buPROPion SR (WELLBUTRIN SR) 150 mg SR tablet TAKE 1 TABLET BY MOUTH TWICE A  Tab 1    magnesium oxide 500 mg tab Take 500 mg by mouth daily.  co-enzyme Q-10 (CO Q-10) 100 mg capsule Take 100 mg by mouth daily.  KRILL OIL PO Take  by mouth.  Alpha Lipoic Acid 200 mg Tab Take  by mouth.  MILK THISTLE, BULK, by Does Not Apply route.  CHOLECALCIFEROL, VITAMIN D3, (VITAMIN D3 PO) Take 2,000 Units by mouth daily. SUBJECTIVE: French Soto is a 78 y.o. female seen for a follow up visit; she has hypertension, hyperlipidemia and calcium on scans. Current Outpatient Prescriptions   Medication Sig Dispense Refill    felodipine (PLENDIL SR) 5 mg 24 hr tablet Take 1 Tab by mouth daily.  90 Tab 1    atorvastatin (LIPITOR) 10 mg tablet TAKE 1 TABLET BY MOUTH EVERY EVENING (Patient taking differently: TAKE 1 TABLET BY MOUTH EVERY two days) 90 Tab 3    buPROPion SR (WELLBUTRIN SR) 150 mg SR tablet TAKE 1 TABLET BY MOUTH TWICE A  Tab 1    aspirin 81 mg chewable tablet Take 81 mg by mouth daily.  felodipine (PLENDIL SR) 5 mg 24 hr tablet TAKE 1 TABLET BY MOUTH EVERY DAY 90 Tab 1    ibuprofen (MOTRIN) 600 mg tablet Take 1 Tab by mouth every six (6) hours as needed for Pain. 20 Tab 0    buPROPion SR (WELLBUTRIN SR) 150 mg SR tablet TAKE 1 TABLET BY MOUTH TWICE A  Tab 1    magnesium oxide 500 mg tab Take 500 mg by mouth daily.  co-enzyme Q-10 (CO Q-10) 100 mg capsule Take 100 mg by mouth daily.  KRILL OIL PO Take  by mouth.  Alpha Lipoic Acid 200 mg Tab Take  by mouth.  MILK THISTLE, BULK, by Does Not Apply route.  CHOLECALCIFEROL, VITAMIN D3, (VITAMIN D3 PO) Take 2,000 Units by mouth daily. Patient Active Problem List   Diagnosis Code    Hypertension I10    Raynaud's disease I73.00    Depression F32.9    Osteoporosis M81.0    HLD (hyperlipidemia) E78.5    Degenerative disc disease [id number]    Lipoma of back D17.1    Aortic calcification (HCC) I70.0    Hepatitis C antibody test positive R76.8    Tobacco abuse Z72.0     System Review: Cardiovascular ROS - taking medications as instructed, no medication side effects noted, patient does not perform home BP monitoring, no TIA's, no chest pain on exertion, no dyspnea on exertion, no swelling of ankles, vague leg cramps from statin, CNS ROS - no TIA or stroke-like symptoms, no amaurosis, diplopia, abnormal speech, unilateral numbness or weakness. New concerns: off fosamax had gerd   Not   oscal or d supplements  Stopped caltrate  Denies chest pain oicc back pain   No dyspnea.    Sleep poor  Works long hours  Takes second dose wellbutrin 3 pm  Mood good    OBJECTIVE:  Visit Vitals    /80    Pulse 80    Temp 97.8 °F (36.6 °C) (Oral)    Resp 18    Ht 5' 2\" (1.575 m)    Wt 150 lb 3.2 oz (68.1 kg)    SpO2 99%    BMI 27.47 kg/m2      Appearance: alert, well appearing, and in no distress, oriented to person, place, and time and normal appearing weight. General exam: CVS exam BP noted to be well controlled today in office, S1, S2 normal, no gallop, no murmur, chest clear, no JVD, no HSM, no edema. Lab review: orders written for new lab studies as appropriate; see orders. ASSESSMENT:  hypertension stable, hyperlipidemia borderline controlled, may need VIT D  May need different statin, does not want IV for  osteoporosis. PLAN:  current treatment plan is effective, no change in therapy  reviewed diet, exercise and weight control  recommended sodium restriction  very strongly urged to quit smoking to reduce cardiovascular risk. 1. Essential hypertension  Controlled well  - LIPID PANEL  - CBC WITH AUTOMATED DIFF  - METABOLIC PANEL, COMPREHENSIVE  - VITAMIN D, 25 HYDROXY; Future  - felodipine (PLENDIL SR) 5 mg 24 hr tablet; Take 1 Tab by mouth daily. Dispense: 90 Tab; Refill: 1    2. Dysthymia  Stable will try 12 noon second dose of wellbutrin    3. Hepatitis C antibody test positive  Was antigen neg    4. Vitamin D deficiency  Needs a supplement as 1000 per day  - VITAMIN D, 25 HYDROXY; Future    5.  Insomnia, unspecified type  Multifactorial wine in evening  Avoid caffeine  Try wellbut earlier in day

## 2018-08-21 NOTE — MR AVS SNAPSHOT
[address] [place] [address] [address] [facility] 57 
[phone] Patient: Yodit Osborn MRN: [id number] Visit Information Date & Time Provider Department Dept. Phone Encounter #  
 8/21/2018  8:30 AM Lg Choi MD [facility] [phone] [id number] Upcoming Health Maintenance Date Due COLONOSCOPY 12/10/1969 BREAST CANCER SCRN MAMMOGRAM 12/10/2001 DTaP/Tdap/Td series (1 - Tdap) 1/3/2008 ZOSTER VACCINE AGE 60> 10/10/2011 GLAUCOMA SCREENING Q2Y 12/10/2016 Influenza Age 5 to Adult 8/1/2018 Pneumococcal 65+ Low/Medium Risk (2 of 2 - PPSV23) 11/1/2018 MEDICARE YEARLY EXAM 12/14/2018 Allergies as of 8/21/2018  Review Complete On: 8/21/2018 By: Deepak Fuentes Severity Noted Reaction Type Reactions Fosamax [Alendronate]  12/13/2017    Nausea Only Keflex [Cephalexin]  09/28/2016    Angioedema Current Immunizations  Reviewed on 8/21/2018 Name Date Influenza Vaccine (Quad) PF 11/1/2017, 11/25/2014 Pneumococcal Conjugate (PCV-13) 11/1/2017 Pneumococcal Vaccine (Unspecified Type) 9/29/2009 TD Vaccine 1/2/2008 ZZZ-RETIRED (DO NOT USE) Pneumococcal Vaccine (Unspecified Type) 1/2/2008 Reviewed by Lg Choi MD on 8/21/2018 at  8:45 AM  
You Were Diagnosed With   
  
 Codes Comments Essential hypertension    -  Primary ICD-10-CM: I10 
ICD-9-CM: 401.9 Dysthymia     ICD-10-CM: F34.1 ICD-9-CM: 300.4 Hepatitis C antibody test positive     ICD-10-CM: R76.8 ICD-9-CM: 795.79 Vitamin D deficiency     ICD-10-CM: E55.9 ICD-9-CM: 268.9 Vitals BP Pulse Temp Resp Height(growth percentile) Weight(growth percentile) 121/80 80 97.8 °F (36.6 °C) (Oral) 18 5' 2\" (1.575 m) 150 lb 3.2 oz (68.1 kg) SpO2 BMI OB Status Smoking Status 99% 27.47 kg/m2 Postmenopausal Current Every Day Smoker BMI and BSA Data Body Mass Index Body Surface Area  
 27.47 kg/m 2 1.73 m 2 Preferred Pharmacy Pharmacy Name Phone [id number] [phone] Your Updated Medication List  
  
   
This list is accurate as of 8/21/18  8:52 AM.  Always use your most recent med list.  
  
  
  
  
 Alpha Lipoic Acid 200 mg Tab Take  by mouth. aspirin 81 mg chewable tablet Take 81 mg by mouth daily. atorvastatin 10 mg tablet Commonly known as:  LIPITOR  
TAKE 1 TABLET BY MOUTH EVERY EVENING  
  
 * buPROPion  mg SR tablet Commonly known as:  WELLBUTRIN SR  
TAKE 1 TABLET BY MOUTH TWICE A DAY * buPROPion  mg SR tablet Commonly known as:  WELLBUTRIN SR  
TAKE 1 TABLET BY MOUTH TWICE A DAY  
  
 CO Q-10 100 mg capsule Generic drug:  co-enzyme Q-10 Take 100 mg by mouth daily. * felodipine 5 mg 24 hr tablet Commonly known as:  PLENDIL SR  
TAKE 1 TABLET BY MOUTH EVERY DAY  
  
 * felodipine 5 mg 24 hr tablet Commonly known as:  PLENDIL SR Take 1 Tab by mouth daily. ibuprofen 600 mg tablet Commonly known as:  MOTRIN Take 1 Tab by mouth every six (6) hours as needed for Pain. KRILL OIL PO Take  by mouth.  
  
 magnesium oxide 500 mg Tab Take 500 mg by mouth daily. MILK THISTLE (BULK)  
by Does Not Apply route. VITAMIN D3 PO Take 2,000 Units by mouth daily. * Notice: This list has 4 medication(s) that are the same as other medications prescribed for you. Read the directions carefully, and ask your doctor or other care provider to review them with you. Prescriptions Sent to Pharmacy Refills  
 felodipine (PLENDIL SR) 5 mg 24 hr tablet 1 Sig: Take 1 Tab by mouth daily. Class: Normal  
 Pharmacy: [id number] Ph #: [phone]  Route: Oral  
  
 We Performed the Following CBC WITH AUTOMATED DIFF [67396 CPT(R)] LIPID PANEL [91267 CPT(R)] METABOLIC PANEL, COMPREHENSIVE [53147 CPT(R)] To-Do List   
 08/21/2018 Lab:  VITAMIN D, 25 HYDROXY Introducing [facility]! [facility] introduces [employer] patient portal. Now you can access parts of your medical record, email your doctor's office, and request medication refills online. 1. In your internet browser, go to [email or web address] 2. Click on the First Time User? Click Here link in the Sign In box. You will see the New Member Sign Up page. 3. Enter your [employer] Access Code exactly as it appears below. You will not need to use this code after youve completed the sign-up process. If you do not sign up before the expiration date, you must request a new code. · [employer] Access Code: [email or web address] Expires: 11/19/2018  8:20 AM 
 
4. Enter the last four digits of your Social Security Number (xxxx) and Date of Birth (mm/dd/yyyy) as indicated and click Submit. You will be taken to the next sign-up page. 5. Create a [employer] ID. This will be your [employer] login ID and cannot be changed, so think of one that is secure and easy to remember. 6. Create a [employer] password. You can change your password at any time. 7. Enter your Password Reset Question and Answer. This can be used at a later time if you forget your password. 8. Enter your e-mail address. You will receive e-mail notification when new information is available in [address] 9. Click Sign Up. You can now view and download portions of your medical record. 10. Click the Download Summary menu link to download a portable copy of your medical information. If you have questions, please visit the Frequently Asked Questions section of the [employer] website. Remember, [employer] is NOT to be used for urgent needs. For medical emergencies, dial 911. Now available from your iPhone and Android! Please provide this summary of care documentation to your next provider. Your primary care clinician is listed as Ava Garcia. If you have any questions after today's visit, please call [phone].

## 2018-08-22 LAB
ALBUMIN SERPL-MCNC: 4.5 G/DL (ref 3.6–4.8)
ALBUMIN/GLOB SERPL: 2.1 {RATIO} (ref 1.2–2.2)
ALP SERPL-CCNC: 99 IU/L (ref 39–117)
ALT SERPL-CCNC: 10 IU/L (ref 0–32)
AST SERPL-CCNC: 17 IU/L (ref 0–40)
BASOPHILS # BLD AUTO: 0 X10E3/UL (ref 0–0.2)
BASOPHILS NFR BLD AUTO: 1 %
BILIRUB SERPL-MCNC: 0.2 MG/DL (ref 0–1.2)
BUN SERPL-MCNC: 10 MG/DL (ref 8–27)
BUN/CREAT SERPL: 15 (ref 12–28)
CALCIUM SERPL-MCNC: 9.6 MG/DL (ref 8.7–10.3)
CHLORIDE SERPL-SCNC: 104 MMOL/L (ref 96–106)
CHOLEST SERPL-MCNC: 197 MG/DL (ref 100–199)
CO2 SERPL-SCNC: 23 MMOL/L (ref 20–29)
CREAT SERPL-MCNC: 0.65 MG/DL (ref 0.57–1)
EOSINOPHIL # BLD AUTO: 0.1 X10E3/UL (ref 0–0.4)
EOSINOPHIL NFR BLD AUTO: 2 %
ERYTHROCYTE [DISTWIDTH] IN BLOOD BY AUTOMATED COUNT: 13.5 % (ref 12.3–15.4)
GLOBULIN SER CALC-MCNC: 2.1 G/DL (ref 1.5–4.5)
GLUCOSE SERPL-MCNC: 92 MG/DL (ref 65–99)
HCT VFR BLD AUTO: 40.7 % (ref 34–46.6)
HDLC SERPL-MCNC: 70 MG/DL
HGB BLD-MCNC: 13.3 G/DL (ref 11.1–15.9)
IMM GRANULOCYTES # BLD: 0 X10E3/UL (ref 0–0.1)
IMM GRANULOCYTES NFR BLD: 0 %
INTERPRETATION, 910389: NORMAL
LDLC SERPL CALC-MCNC: 113 MG/DL (ref 0–99)
LYMPHOCYTES # BLD AUTO: 1.9 X10E3/UL (ref 0.7–3.1)
LYMPHOCYTES NFR BLD AUTO: 34 %
MCH RBC QN AUTO: 29.4 PG (ref 26.6–33)
MCHC RBC AUTO-ENTMCNC: 32.7 G/DL (ref 31.5–35.7)
MCV RBC AUTO: 90 FL (ref 79–97)
MONOCYTES # BLD AUTO: 0.6 X10E3/UL (ref 0.1–0.9)
MONOCYTES NFR BLD AUTO: 10 %
NEUTROPHILS # BLD AUTO: 3 X10E3/UL (ref 1.4–7)
NEUTROPHILS NFR BLD AUTO: 53 %
PLATELET # BLD AUTO: 385 X10E3/UL (ref 150–379)
POTASSIUM SERPL-SCNC: 4.9 MMOL/L (ref 3.5–5.2)
PROT SERPL-MCNC: 6.6 G/DL (ref 6–8.5)
RBC # BLD AUTO: 4.52 X10E6/UL (ref 3.77–5.28)
SODIUM SERPL-SCNC: 144 MMOL/L (ref 134–144)
TRIGL SERPL-MCNC: 68 MG/DL (ref 0–149)
VLDLC SERPL CALC-MCNC: 14 MG/DL (ref 5–40)
WBC # BLD AUTO: 5.5 X10E3/UL (ref 3.4–10.8)

## 2018-08-24 DIAGNOSIS — R82.90 ABNORMAL URINE: Primary | ICD-10-CM

## 2018-08-24 LAB
BILIRUB UR QL STRIP: NORMAL
GLUCOSE UR-MCNC: NEGATIVE MG/DL
KETONES P FAST UR STRIP-MCNC: NEGATIVE MG/DL
PH UR STRIP: 6 [PH] (ref 4.6–8)
PROT UR QL STRIP: NORMAL
SP GR UR STRIP: 1.02 (ref 1–1.03)
UA UROBILINOGEN AMB POC: NORMAL (ref 0.2–1)
URINALYSIS CLARITY POC: CLEAR
URINALYSIS COLOR POC: YELLOW
URINE BLOOD POC: NORMAL
URINE LEUKOCYTES POC: NORMAL
URINE NITRITES POC: NEGATIVE

## 2018-08-25 LAB — BACTERIA UR CULT: NO GROWTH

## 2018-08-28 ENCOUNTER — OFFICE VISIT (OUTPATIENT)
Dept: INTERNAL MEDICINE CLINIC | Age: 68
End: 2018-08-28

## 2018-08-28 VITALS
WEIGHT: 148 LBS | HEIGHT: 62 IN | RESPIRATION RATE: 20 BRPM | HEART RATE: 94 BPM | BODY MASS INDEX: 27.23 KG/M2 | SYSTOLIC BLOOD PRESSURE: 154 MMHG | OXYGEN SATURATION: 97 % | TEMPERATURE: 98.3 F | DIASTOLIC BLOOD PRESSURE: 94 MMHG

## 2018-08-28 DIAGNOSIS — J01.10 ACUTE FRONTAL SINUSITIS, RECURRENCE NOT SPECIFIED: ICD-10-CM

## 2018-08-28 DIAGNOSIS — R07.89 STERNUM PAIN: Primary | ICD-10-CM

## 2018-08-28 RX ORDER — AZITHROMYCIN 250 MG/1
TABLET, FILM COATED ORAL
Qty: 6 TAB | Refills: 0 | Status: SHIPPED | OUTPATIENT
Start: 2018-08-28 | End: 2019-01-09 | Stop reason: ALTCHOICE

## 2018-08-28 NOTE — PROGRESS NOTES
Chief Complaint   Patient presents with    Swelling     x2 days, breast bone swelling and tenderness, no injury noted, patient has osteoporosis, patient has been know to crack a rib, hurts to breath and bend over       Reviewed record in preparation for visit and have obtained necessary documentation. Identified pt with two pt identifiers(name and ). Health Maintenance Due   Topic    COLONOSCOPY     BREAST CANCER SCRN MAMMOGRAM     DTaP/Tdap/Td series (1 - Tdap)    ZOSTER VACCINE AGE 60>     GLAUCOMA SCREENING Q2Y     Influenza Age 5 to Adult          Chief Complaint   Patient presents with    Swelling     x2 days, breast bone swelling and tenderness, no injury noted, patient has osteoporosis, patient has been know to crack a rib, hurts to breath and bend over        Wt Readings from Last 3 Encounters:   18 148 lb (67.1 kg)   18 150 lb 3.2 oz (68.1 kg)   18 149 lb 11.1 oz (67.9 kg)     Temp Readings from Last 3 Encounters:   18 98.3 °F (36.8 °C) (Oral)   18 97.8 °F (36.6 °C) (Oral)   18 97.9 °F (36.6 °C)     BP Readings from Last 3 Encounters:   18 121/80   18 (!) 176/96   17 127/80     Pulse Readings from Last 3 Encounters:   18 80   18 91   17 86           Learning Assessment:  :     Learning Assessment 2018   PRIMARY LEARNER Patient Patient   PRIMARY LANGUAGE ENGLISH ENGLISH   LEARNER PREFERENCE PRIMARY DEMONSTRATION DEMONSTRATION   ANSWERED BY self self   RELATIONSHIP SELF SELF       Depression Screening:  :     PHQ over the last two weeks 2018   Little interest or pleasure in doing things Not at all   Feeling down, depressed, irritable, or hopeless Not at all   Total Score PHQ 2 0       Fall Risk Assessment:  :     Fall Risk Assessment, last 12 mths 2018   Able to walk? Yes   Fall in past 12 months?  No       Abuse Screening:  :     Abuse Screening Questionnaire 2017   Do you ever feel afraid of your partner? N N   Are you in a relationship with someone who physically or mentally threatens you? N N   Is it safe for you to go home? Y Y       Coordination of Care Questionnaire:  :     1) Have you been to an emergency room, urgent care clinic since your last visit? no   Hospitalized since your last visit? no             2) Have you seen or consulted any other health care providers outside of [address] [place] [address] since your last visit? no  (Include any pap smears or colon screenings in this section.)    3) Do you have an Advance Directive on file? no    4) Are you interested in receiving information on Advance Directives? YES      Patient is accompanied by self I have received verbal consent from Marialuisa Molina to discuss any/all medical information while they are present in the room.

## 2018-08-28 NOTE — PROGRESS NOTES
HISTORY OF PRESENT ILLNESS  [place] is a 78 y.o. female. HPI  Patient presents to the office for evaluation of sternum pain. She states she woke up yesterday with pain of her chest. She reports she went to work and as the day went on it seemed to get worse. She reports when she takes a deep breath it hurts at the bottom of her sternum. She has a history of osteoporosis and has fractured bones in the past without injury. She took 600 mg of motrin yesterday twice. She is concerned because to her it looks swollen. Also she is having sinus symptoms. She denies facial tenderness. She has been blowing out thick yellow/green mucous. Lots of congestion. Review of Systems   HENT: Positive for congestion. Blood pressure (!) 154/94, pulse 94, temperature 98.3 °F (36.8 °C), temperature source Oral, resp. rate 20, height 5' 2\" (1.575 m), weight 148 lb (67.1 kg), SpO2 97 %. Physical Exam   Constitutional: She appears well-developed and well-nourished. No distress. Cardiovascular: Normal rate and regular rhythm. No murmur heard. Pulmonary/Chest: Breath sounds normal. No respiratory distress. She has no wheezes. She exhibits tenderness. Musculoskeletal: She exhibits tenderness. She exhibits no edema. Prominent sternum but does not appear to be swollen. Tenderness over the sternum noted. ASSESSMENT and PLAN  Diagnoses and all orders for this visit:    1. Sternum pain    2. Acute frontal sinusitis, recurrence not specified  -     azithromycin (ZITHROMAX) 250 mg tablet; Take two tablets today then take one tablet daily for 4 days      Dr. Cyndee Fierro was able to see this patient briefly as well. Patient has been advised to take the motrin 600 mg tid and may use warm compress to the area. If not feeling better in next 48 hours she is to contact the office for follow up. Advised the patient to get some saline for sinuses and take medication as prescribed.  All this was discussed with the patient and she understands and agrees.

## 2018-10-09 ENCOUNTER — OFFICE VISIT (OUTPATIENT)
Dept: INTERNAL MEDICINE CLINIC | Age: 68
End: 2018-10-09

## 2018-10-09 VITALS
SYSTOLIC BLOOD PRESSURE: 158 MMHG | RESPIRATION RATE: 16 BRPM | OXYGEN SATURATION: 99 % | TEMPERATURE: 98.5 F | HEART RATE: 85 BPM | DIASTOLIC BLOOD PRESSURE: 84 MMHG | HEIGHT: 62 IN | WEIGHT: 145.4 LBS | BODY MASS INDEX: 26.76 KG/M2

## 2018-10-09 DIAGNOSIS — H92.02 OTALGIA OF LEFT EAR: Primary | ICD-10-CM

## 2018-10-09 RX ORDER — IBUPROFEN 600 MG/1
600 TABLET ORAL
Qty: 100 TAB | Refills: 1 | Status: SHIPPED | OUTPATIENT
Start: 2018-10-09 | End: 2020-11-24 | Stop reason: ALTCHOICE

## 2018-10-09 NOTE — PROGRESS NOTES
All health maintenance and other pertinent information has been reviewed in preparation for today's office visit. Patient presents in the office today for: Chief Complaint Patient presents with  Ear Pain Pt c/o bum with soreness behing left ear x's 2 days. 1. Have you been to the ER, urgent care clinic since your last visit? Hospitalized since your last visit? Yes. [facility] for right hand fx and left hand sprain 1 week ago. 2. Have you seen or consulted any other health care providers outside of the [facility] since your last visit? Include any pap smears or colon screening.  No

## 2018-10-09 NOTE — PROGRESS NOTES
Chief Complaint Patient presents with  Ear Pain Pt c/o bum with soreness behing left ear x's 2 days. Recent right hand fracture Left ear discomfort  For 2 days staying home for a week Has otalgia  Some ringing pain behind left ear No active dental problems Patient Active Problem List  
 Diagnosis  Tobacco abuse  Hepatitis C antibody test positive Likely from blood transfusion  Aortic calcification (HCC)  Lipoma of back  Degenerative disc disease  HLD (hyperlipidemia)  Osteoporosis Never took osteoporosis medication due to worry about side effects  Hypertension  Raynaud's disease  Depression Vitals:  
 10/09/18 1540 BP: 158/84 Pulse: 85 Resp: 16 Temp: 98.5 °F (36.9 °C) TempSrc: Oral  
SpO2: 99% Weight: 145 lb 6.4 oz (66 kg) Height: 5' 2\" (1.575 m) Ear exam - bilateral normal, TM intact without perforation or effusion, external canal normal. No significant ceruminosis noted. Some tenderness left post auricular Teeth no active issue 10/9/2018 4:17 PM : Assessment reviewed by supervising provider: Ora Vazquez MD  
 
Diagnoses and all orders for this visit: 
 
1. Otalgia of left ear Other orders -     ibuprofen (MOTRIN) 600 mg tablet; Take 1 Tab by mouth every six (6) hours as needed for Pain. Indications: Pain Use motrin Later re eval the osteoporosis

## 2018-11-05 RX ORDER — BUPROPION HYDROCHLORIDE 150 MG/1
TABLET, EXTENDED RELEASE ORAL
Qty: 180 TAB | Refills: 1 | Status: SHIPPED | OUTPATIENT
Start: 2018-11-05 | End: 2019-05-05 | Stop reason: SDUPTHER

## 2019-01-09 ENCOUNTER — OFFICE VISIT (OUTPATIENT)
Dept: INTERNAL MEDICINE CLINIC | Age: 69
End: 2019-01-09

## 2019-01-09 VITALS
HEART RATE: 89 BPM | OXYGEN SATURATION: 98 % | RESPIRATION RATE: 18 BRPM | WEIGHT: 144.8 LBS | HEIGHT: 62 IN | SYSTOLIC BLOOD PRESSURE: 140 MMHG | TEMPERATURE: 96.7 F | DIASTOLIC BLOOD PRESSURE: 90 MMHG | BODY MASS INDEX: 26.65 KG/M2

## 2019-01-09 DIAGNOSIS — D04.70: Primary | ICD-10-CM

## 2019-01-09 DIAGNOSIS — Z23 ENCOUNTER FOR IMMUNIZATION: ICD-10-CM

## 2019-01-09 DIAGNOSIS — F34.1 DYSTHYMIA: ICD-10-CM

## 2019-01-09 RX ORDER — BUPROPION HYDROCHLORIDE 300 MG/1
300 TABLET ORAL
Qty: 90 TAB | Refills: 1 | Status: SHIPPED | OUTPATIENT
Start: 2019-01-09 | End: 2019-08-22 | Stop reason: SDUPTHER

## 2019-01-09 NOTE — PROGRESS NOTES
Chief Complaint   Patient presents with    Warts     on left leg  growing slowly and some discomfort has tried wart medication     1 month enlarging skin lesion    Patient Active Problem List    Diagnosis    Tobacco abuse    Hepatitis C antibody test positive     Likely from blood transfusion        Aortic calcification (HCC)    Lipoma of back    Degenerative disc disease    HLD (hyperlipidemia)    Osteoporosis     Never took osteoporosis medication due to worry about side effects      Hypertension    Raynaud's disease    Depression     Depression fair gets insomnia  Seeing counselor    Vitals:    01/09/19 0834 01/09/19 0856   BP: (!) 152/95 140/90   Pulse: 89    Resp: 18    Temp: 96.7 °F (35.9 °C)    TempSrc: Oral    SpO2: 98%    Weight: 144 lb 12.8 oz (65.7 kg)    Height: 5' 2\" (1.575 m)      S1 and S2 normal, no murmurs, clicks, gallops or rubs. Regular rate and rhythm. Chest is clear; no wheezes or rales. No edema or JVD. Left lower leg raised skin lesion appears to be Sq cell cancer      Diagnoses and all orders for this visit:    1. Carcinoma in situ of skin of lower extremity, including hip, unspecified laterality  -     REFERRAL TO DERMATOLOGY    2. Encounter for immunization  -     INFLUENZA VACCINE INACTIVATED (IIV), SUBUNIT, ADJUVANTED, IM    3. Dysthymia    Other orders  -     buPROPion XL (WELLBUTRIN XL) 300 mg XL tablet; Take 1 Tab by mouth every morning.       Will try 24 hour wellbutrin  See derm

## 2019-01-09 NOTE — PROGRESS NOTES
1. Have you been to the ER, urgent care clinic since your last visit? Hospitalized since your last visit?yes. Torn meniscus @ [facility]    2. Have you seen or consulted any other health care providers outside of the [address] [place] [address] since your last visit? Include any pap smears or colon screening. Yes.   [facility] Dr Jesus Christie and Dr Cristine Elmore

## 2019-01-17 ENCOUNTER — OFFICE VISIT (OUTPATIENT)
Dept: DERMATOLOGY | Facility: AMBULATORY SURGERY CENTER | Age: 69
End: 2019-01-17

## 2019-01-17 VITALS
DIASTOLIC BLOOD PRESSURE: 84 MMHG | WEIGHT: 144 LBS | HEIGHT: 62 IN | RESPIRATION RATE: 14 BRPM | BODY MASS INDEX: 26.5 KG/M2 | HEART RATE: 89 BPM | TEMPERATURE: 98.1 F | OXYGEN SATURATION: 94 % | SYSTOLIC BLOOD PRESSURE: 132 MMHG

## 2019-01-17 VITALS
WEIGHT: 144 LBS | OXYGEN SATURATION: 94 % | BODY MASS INDEX: 26.5 KG/M2 | RESPIRATION RATE: 16 BRPM | SYSTOLIC BLOOD PRESSURE: 132 MMHG | HEIGHT: 62 IN | TEMPERATURE: 98.1 F | DIASTOLIC BLOOD PRESSURE: 84 MMHG

## 2019-01-17 DIAGNOSIS — L82.1 SEBORRHEIC KERATOSES: ICD-10-CM

## 2019-01-17 DIAGNOSIS — C44.729 SQUAMOUS CELL CARCINOMA, LEG, LEFT: Primary | ICD-10-CM

## 2019-01-17 DIAGNOSIS — D22.9 BENIGN NEVUS: ICD-10-CM

## 2019-01-17 DIAGNOSIS — L81.4 LENTIGINES: ICD-10-CM

## 2019-01-17 DIAGNOSIS — D48.5 NEOPLASM OF UNCERTAIN BEHAVIOR OF SKIN OF LOWER LEG: ICD-10-CM

## 2019-01-17 DIAGNOSIS — D18.01 CHERRY ANGIOMA: ICD-10-CM

## 2019-01-17 DIAGNOSIS — L98.9 SCALP LESION: ICD-10-CM

## 2019-01-17 DIAGNOSIS — D48.5 NEOPLASM OF UNCERTAIN BEHAVIOR OF SKIN OF LOWER LEG: Primary | ICD-10-CM

## 2019-01-17 RX ORDER — OXYCODONE AND ACETAMINOPHEN 5; 325 MG/1; MG/1
1 TABLET ORAL
Qty: 30 TAB | Refills: 0 | Status: SHIPPED | OUTPATIENT
Start: 2019-01-17 | End: 2019-08-22 | Stop reason: ALTCHOICE

## 2019-01-17 RX ORDER — DOXYCYCLINE 100 MG/1
100 CAPSULE ORAL 2 TIMES DAILY
Qty: 14 CAP | Refills: 0 | Status: SHIPPED | OUTPATIENT
Start: 2019-01-17 | End: 2019-01-24

## 2019-01-17 NOTE — PROGRESS NOTES
Written by Bruno Caro, as dictated by Edwin Armenta, [address]. Name: Genet Orta       Age: 80 y.o. Date: 1/17/2019    Chief Complaint:   Chief Complaint   Patient presents with    Skin Exam     Areas of concern: Spot on Left leg, bumps on scalp, mole on back       Subjective:    HPI:  Ms.. Genet Orta is a 80 y.o. female who presents for the evaluation of a lesion on the left shin. The patient was referred by Dr. Alexandra Joyce for this evaluation. She states that the lesion appeared months ago. The patient has not had prior treatment for this lesion. Associated symptoms include persistent, growing, and painful lesion that concerned Dr. Alexandra Joyce. She states she treated the lesion like a wart with OTC medications with no relief and the lesion has grown. She also reports bothersome \"bumps\" on her scalp that wax and wane - although she reports they are not present. She would like her back evaluated - she reports a mole on the back. She reports a history of multiple cysts. ROS: Consitutional: Negative  Dermatological : positive for - skin lesion changes    Social History     Socioeconomic History    Marital status:      Spouse name: Not on file    Number of children: Not on file    Years of education: Not on file    Highest education level: Not on file   Social Needs    Financial resource strain: Not on file    Food insecurity - worry: Not on file    Food insecurity - inability: Not on file    Transportation needs - medical: Not on file   [employer] needs - non-medical: Not on file   Occupational History    Not on file   Tobacco Use    Smoking status: Current Every Day Smoker     Packs/day: 0.25     Years: 25.00     Pack years: 6.25     Types: Cigarettes    Smokeless tobacco: Former User     Quit date: 1/27/2014   Substance and Sexual Activity    Alcohol use:  Yes     Alcohol/week: 1.5 oz     Types: 3 Glasses of wine per week     Comment: occasionally    Drug use: No    Sexual activity: Not Currently   Other Topics Concern    Not on file   Social History Narrative    Not on file       Family History   Problem Relation Age of Onset    Parkinsonism Mother     Hypertension Father     Diabetes Father     Heart Disease Father 61               Past Medical History:   Diagnosis Date    Aortic calcification ([address] 2014    Degenerative disc disease 2012    neck    Family history of skin cancer     Hepatitis C antibody test positive 2015    Hypertension     Lipoma of back 6/15/2012    Mood swings     mood swings with menopause    Osteoporosis 2010    Raynaud's disease     Sunburn, blistering     Tanning bed exposure        Past Surgical History:   Procedure Laterality Date    HX  SECTION  0074,1634,5152     x 3    HX CHOLECYSTECTOMY  1974       Current Outpatient Medications   Medication Sig Dispense Refill    buPROPion XL (WELLBUTRIN XL) 300 mg XL tablet Take 1 Tab by mouth every morning. 90 Tab 1    buPROPion SR (WELLBUTRIN SR) 150 mg SR tablet TAKE 1 TABLET BY MOUTH TWICE A  Tab 1    ibuprofen (MOTRIN) 600 mg tablet Take 1 Tab by mouth every six (6) hours as needed for Pain. Indications: Pain 100 Tab 1    felodipine (PLENDIL SR) 5 mg 24 hr tablet TAKE 1 TABLET BY MOUTH EVERY DAY 90 Tab 1    atorvastatin (LIPITOR) 10 mg tablet TAKE 1 TABLET BY MOUTH EVERY EVENING (Patient taking differently: TAKE 1 TABLET BY MOUTH EVERY two days) 90 Tab 3    aspirin 81 mg chewable tablet Take 81 mg by mouth daily.  CHOLECALCIFEROL, VITAMIN D3, (VITAMIN D3 PO) Take 2,000 Units by mouth daily.          Allergies   Allergen Reactions    Codeine Nausea and Vomiting    Fosamax [Alendronate] Nausea Only    Keflex [Cephalexin] Angioedema         Objective:    Visit Vitals  /84   Temp 98.1 °F (36.7 °C)   Resp 16   Ht 5' 2\" (1.575 m)   Wt 144 lb (65.3 kg)   SpO2 94%   BMI 26.34 kg/m²       Clydie L Einstein Mary Grace Ferrer is a 80 y.o. female who appears well and in no distress. She is awake, alert, and oriented. There is no preauricular, submandibular, or cervical lymphadenopathy. A limited skin examination was completed including her left lower leg, scalp, and back. There is a 15 x 16 mm erythematous keratotic exophytic nodule on the left shin, most consistent with squamous cell carcinoma. There is no evidence of lesions, rash, or concerning features on the scalp. She has scattered waxy macules and keratotic papules consistent with seborrheic keratoses. She has a pink intradermal nevus on the left posterior shoulder. There is a cherry angioma on the back as well. . There are lentigines on sun exposed areas.       Left shin    Assessment/Plan:    1. Neoplasm of Uncertain Behavior, left shin, favor SCC. The differential diagnoses were discussed. The patient will have the lesion treated by Dr. Dianne Rocha today. 2. Skin lesion of scalp. The differential diagnoses were reviewed. Option of [employer] use was given. 3. Seborrheic keratoses. The diagnosis was reviewed and the patient was reassured that no treatment is needed for these benign lesions. 4. Normal nevi. The diagnosis of normal nevi was reviewed. I discussed sun protection, sunscreen use, the warning signs of skin cancer, the need for self-skin examinations, and the need for regular practitioner exams every 6 months. The patient should follow up sooner as needed if new, changing, or symptomatic skin lesions arise. 5. Solar lentigos. The diagnosis and relationship to sun exposure was reviewed. Sun protection advised. 6.Cherry angiomas. The diagnosis was reviewed and the patient was reassured that no treatment is needed for these benign lesions. This plan was reviewed with the patient and patient agrees. All questions were answered.     This scribe documentation was reviewed by me and accurately reflects the examination and decisions made by me.

## 2019-01-17 NOTE — PATIENT INSTRUCTIONS
WOUND CARE INSTRUCTIONS    1. Keep the dressing clean and dry and do not remove for 48 hours. 2. Then change the dressing once a day as follows:  a. Wash hands before and after each dressing change. b. Remove dressing and wash site gently with mild soap and water, rinse, and pat dry.  c. Apply an ointment (Bacitracin, Polysporin, Neosporin, Petroleum jelly or Aquaphor). d. Apply a non-stick (Telfa) dressing or Band-Aid to cover the wound. 3. Watch for:  BLEEDING: A small amount of drainage may occur. If bleeding occurs, elevate and rest the surgery site. Apply gauze and steady pressure for 15 minutes. If bleeding continues, call this office. INFECTION: Signs of infection include increased redness, pain, warmth, drainage of pus, and fever. If this occurs, call this office. 4. Special Instructions (follow any that are checked):  · [x] You have stitches that DO need to be removed. · [] Avoid bending at the waist and heavy lifting for two days. · [] Sleep with your head elevated for the next two nights. · [x] Rest the surgery site and keep it elevated as much as possible for two days. · [x] You may apply an ice-pack for 10-15 minutes every waking hour for the rest of the day. · [] Eat a soft diet and avoid hot food and hot drinks for the rest of the day. · [] Other instructions: Follow up as directed. Take Tylenol or Ibuprofen for pain as needed. Once the site is healed with no remaining bandages or open areas, protect your surgical site and scar from the sun, as this area will be more sensitive. Use a broad spectrum sunscreen SPF 30 or higher daily, and a chemical free product (one containing zinc oxide or titanium dioxide) is a good choice if the area is sensitive. You may begin to gently massage the surgical site in 2-3 weeks, rubbing in a circular motion along the scar. This can help reduce swelling and thickness of a scar.  A scar cream may be used beginnning 1 month after the surgery. If you have any questions or concerns, please call our office Monday through Friday 8 am-5 pm at [phone]. You can also contact Dr. Christine Ceballos directly for emergency purposes at [phone].

## 2019-01-17 NOTE — PATIENT INSTRUCTIONS
Chief Complaint   Patient presents with    Skin Exam     Areas of concern: Spot on Left leg, bumps on scalp, mole on back     Self Skin Exam and Sunscreens    Early detection and treatment is essential in the treatment of all forms of skin cancer. If caught early, all forms of skin cancer are curable. In addition to your regular visits, you should perform a monthly skin examination. Over time, you become familiar with what is normally found on your skin and can identify new or suspicious spots. One of the screening tools you can use to assess your skin is to follow the ABCDEs:    A= Asymmetry (One half is unlike the other half)     B= Border (An irregular, scalloped or poorly defined edge)    C= Color (Is varied from one area to another, has shades of tan, brown/ black,       white, red or blue)    D= Diameter (Spots larger than 6mm or a pencil eraser)    E= Evolving (New spots or one that is changing in size, shape, or color)    A follow- up interval will be customized based on your history of skin cancer or level of skin damage and risk factors. In any case, if you notice a suspicious or new spot, an appointment should be arranged between regular visits. Everyone should use sunscreen and sun-safe practices, which is especially important for those with a personal or family history of skin cancer. Suggestions for this include:    1. Use daily moisturizers containing SPF 30 or higher. 2. Wear long sleeve clothing with UPF ratings and a broad-brimmed hat. 3. Apply sunscreen with SPF 30 or higher to all sun exposed areas if you are going to be in the sun. A broad spectrum UVA/ UVB sunscreen is best.  Dont forget to REAPPLY every two hours or more often if swimming or sweating! 4. Avoid outside activities during peak sun hours, especially in the summer (10am- 2pm). 5. DO NOT use tanning beds.     Using sunscreen and sun-safe practices can help reduce the likelihood of developing skin cancer or additional skin cancers in those previously diagnosed.

## 2019-01-18 ENCOUNTER — TELEPHONE (OUTPATIENT)
Dept: DERMATOLOGY | Facility: AMBULATORY SURGERY CENTER | Age: 69
End: 2019-01-18

## 2019-01-18 NOTE — PROGRESS NOTES
Catia Vázquez is a 80 y.o. female presents to the office today with the following:  Chief Complaint   Patient presents with    Squamous Cell Carcinoma     left shin-MOHS       50-year-old  female presented for evaluation of a lesion on the left anterior leg present for approximately 6 months that has been growing and is somewhat painful. The lesion was treated as a wart without improvement and subsequently began to grow. It has never been biopsied. She has no personal history of skin cancer although she has some family members who have had skin cancers. She has no other skin lesions about which she is concerned today. Review of Systems   Constitutional: Negative for chills, fever and weight loss. Respiratory: Negative for shortness of breath. Skin: Negative for rash. Physical Exam   Constitutional: She is well-developed, well-nourished, and in no distress. HENT:   Head: Normocephalic. Eyes: EOM are normal.   Pulmonary/Chest: Effort normal.   Musculoskeletal: She exhibits no edema. Skin:   On the left anterior leg there is a 1.8 x 1.8 cm nodule with a keratotic core. The lesion is somewhat tender. There is minimal pitting edema of the bilateral legs. Orders Placed This Encounter    doxycycline (MONODOX) 100 mg capsule     Sig: Take 1 Cap by mouth two (2) times a day for 7 days. Dispense:  14 Cap     Refill:  0    oxyCODONE-acetaminophen (PERCOCET) 5-325 mg per tablet     Sig: Take 1 Tab by mouth every four (4) hours as needed for Pain for up to 30 doses. Max Daily Amount: 6 Tabs. Dispense:  30 Tab     Refill:  0       1. Neoplasm of uncertain behavior of skin of lower leg  Shave biopsy:    A shave biopsy was advised to address this lesion. The procedure was reviewed and verbal and written consents were obtained. The risks of pain, bleeding, infection, and scar were discussed. I performed the procedure.   The site was cleansed and anesthetized with 1% lidocaine with epinephrine 1:100,000. A shave biopsy was performed to sample the lesion. Drysol was used for hemostasis. The wound was bandaged and care reviewed. This specimen was taken to the Mohs lab for frozen section analysis as most surgery was planned for malignant diagnosis given the location and size of the lesion. I evaluated the lesion histologically once the frozen slides were prepared and rendered a diagnosis. I will contact the patient with the results and any further treatment that may be necessary. Dermatology Pathology Report  [facility], [facility] [address], [address] [place] [address]      Name: Nasrin Elilott    YOB: 1950    Specimen #:    Date: 1/17/2019      Submitting physician: Lindsay Quach MD      Source of tissue: Left shin    Clinical Diagnosis: Squamous cell carcinoma    Gross description:  Received fresh is a 16 mm shave biopsy of skin. The specimen is bisected and processed for frozen vertical sections. Microscopic description: This hematoxylin and eosin stained specimen exhibits a proliferation of atypical keratinocytes with glassy cytoplasm extending into the dermis with overlying parakeratosis and scale crust.  Dermal nerves are not seen in this specimen. Pathology diagnosis: Well-differentiated squamous cell carcinoma      Lindsay Quach MD        [facility]   OFFICE PROCEDURE PROGRESS NOTE   Chart reviewed for the following:   I, Koffi Willis MD have reviewed the History, Physical and updated the Allergic reactions for Jacoybseverohira De La Cruz.     TIME OUT performed immediately prior to start of procedure:   Theo Becker MD, have performed the following reviews on Nasrin Elliott   prior to the start of the procedure:     * Patient was identified by name and date of birth   * Agreement on procedure being performed was verified   * Risks and Benefits explained to the patient   * Procedure site verified and marked as necessary   * Patient was positioned for comfort   * Consent was signed and verified     Time: 0604  Date of procedure: 1/17/2019  Procedure performed by:  Ellen Dave MD  Provider assisted by: Alexander Mckeon LPN and Renate Bermudez LPN  Patient assisted by: self   How tolerated by patient: tolerated the procedure well with no complications   Comments: none

## 2019-01-18 NOTE — TELEPHONE ENCOUNTER
Called pt to f/u after yesterday's Mohs surgery on the left shin. Left VM asking for call back to ensure that all is going alright for the pt and that she is not experiencing any problems.

## 2019-01-18 NOTE — PROGRESS NOTES
Progress note for Mohs surgery patient:    Chief Complaint:  Type: Well Differentiated Squamous Cell Carcinoma cell carcinoma of the Location: Left St    HPI:  French Abdi is a 80y.o. year old female referred by  Annalisa Renioso NP for evaluation and management of a Type: Well Differentiated Squamous Cell Carcinoma cell carcinoma of the Location: Left Shin. The skin cancer has been present for 6 months and has been previously treated. The symptoms of this skin cancer were bleeding, pain. A biopsy performed by  Annalisa Reinoso NP confirmed the diagnosis. ROS:  French is feeling well and in their usual state of health today. She is not in pain. She does not have any other skin concerns. Exam:  French is an awake, alert, oriented, well-appearing Femalein no distress. There is not preauricular, submandibular, or cervical lymphadenopathy. The left leg was examined. Findings are:   Lesion Info  Location: Left Shin  Size: 1.8 x 1.8 cm  Type: Well Differentiated Squamous Cell Carcinoma  Duration: 6 months  Path Lab: [facility]  Path #:   Prior Treatment: none Size: 1.8 x 1.8 cm. A/P:  Type: Well Differentiated Squamous Cell Carcinoma cell carcinoma of the Location: Left Shin. The diagnosis was reviewed. The Mohs surgery procedure was reviewed. Indications, risks, and options were discussed with Ms. Irineo Abdi preoperatively. Risks including, but not limited to: pain, bleeding, infection, tumor recurrence, scarring and damage to motor and/or sensory nerves, were discussed. Ms. Irineo Abdi chose Mohs surgery. Ms. Irineo Abdi was an acceptable surgery candidate. Mohs surgery is indicated by Indications: Site, Size. I performed Mohs surgery after verbal and written consent were obtained. The surgical site was marked with gentian violet, prepped, draped and anesthetized in standard fashion. The tumor was sharply debulked and orientation hashes were placed.   The tumor and any associated scar was excised using beveled incision, hemostasis was achieved, the site was bandaged, and the tissue was transported to the Mohs lab. While maintaining anatomic orientation the tissue was divided if needed and marked with colored inks that were noted on the corresponding Mohs map. The tissue was prepared by Mohs en-face technique for fresh frozen section analysis. The resulting slides were examined for residual tumor, scar and other concerns, all of which were marked on the corresponding Mohs map, if present. The Mohs map was used to guide subsequent stages of surgery, if necessary, and the above process was repeated until a tumor-free plane was achieved. Once the tumor was cleared the map was marked as such and signed. Dr. Stella Ramirez acted as surgeon and pathologist for the entire case, performing all stages of the surgical excision as well as examination and interpretation of the histologic slides. 2 stages were required to reach a tumor-free plane, resulting in a Defect Size: 2.3 x 2.5 cm extending to the Fascia . There were not complications. French will follow up as needed in the postoperative period. Regular skin examinations will be with  Aparna Jolley MD.       The wound management options of second intent healing, layered closure, local flap, or full thickness skin graft were discussed. Ms. Chavarria Nicely understands the aims, risks, alternatives, and possible complications and elects to proceed with a keystone fasciocutaneous advancement flap. Ms. Deon Coats was placed in a supine position on the operating table in the Mohs surgery procedure room. The area was prepped and draped in the standard manner. Gentian violet was used to outline the proposed flap.  1% lidocaine with epinephrine 1:100,000 was used to supplement the already existing anesthesia. The wound penetrated to the fascia layer and measured 2.5 x 2.3 cm.   The wound margins were debeveled and the flap was incised using a #15 blade held perpendicularly to the skin surface. The flap margins were bluntly undermined to the fascia being careful to preserve cutaneous neurovascular bundles between the donor site and flap. Hemostasis was obtained with spot electrocoagulation. The flap was advanced into place. 3-0 PDS buried vertical mattress sutures were used to approximate the deep tissues and dermis. 4-0 Prolene epidermal sutures were used to approximate the skin edges with careful attention to apposition and eversion. The final flap dimensions were 9.3 x 4.5 cm. A pressure dressing utilizing Petrolatum ointment, Telfa, gauze and Coverroll was placed. Wound care instructions (written and/or verbal) and a follow up appointment were given to the patient before discharge. Ms. Osman Francisco was discharged in good condition.

## 2019-02-01 ENCOUNTER — DOCUMENTATION ONLY (OUTPATIENT)
Dept: DERMATOLOGY | Facility: AMBULATORY SURGERY CENTER | Age: 69
End: 2019-02-01

## 2019-02-01 ENCOUNTER — OFFICE VISIT (OUTPATIENT)
Dept: DERMATOLOGY | Facility: AMBULATORY SURGERY CENTER | Age: 69
End: 2019-02-01

## 2019-02-01 VITALS
HEIGHT: 62 IN | DIASTOLIC BLOOD PRESSURE: 92 MMHG | HEART RATE: 88 BPM | WEIGHT: 144 LBS | SYSTOLIC BLOOD PRESSURE: 138 MMHG | OXYGEN SATURATION: 97 % | TEMPERATURE: 98.3 F | RESPIRATION RATE: 16 BRPM | BODY MASS INDEX: 26.5 KG/M2

## 2019-02-01 DIAGNOSIS — C44.729 SQUAMOUS CELL CARCINOMA, LEG, LEFT: Primary | ICD-10-CM

## 2019-02-01 NOTE — PROGRESS NOTES
Patient presents for suture removal from Left Shin post Mohs surgery on 1/17/19. Skin is appropriate for race with some increased pinkness around sutures, skin is intact with no signs of infection. Sutures removed by Rolando Gunter RN. Wound care and activity instructions given. Patient expresses understanding. Pt scheduled to follow up in 2 weeks.

## 2019-02-01 NOTE — PROGRESS NOTES
Wound check/suture removal:    Chief complaint: wound check. HPI: Aranza Israel presents for wound check following Mohs surgery for biopsy-proven squamous cell carcinoma of the left shin reconstructed with a keystone  flap performed on 1/17/2019. Exam: The surgical site was examined. There is not evidence of infection. There is mild erythema at the incision line, which is expected with this repair. There is not edema. There is a mild contour irregularity at the superolateral portion of the reconstruction. A/P:  Wound check. The surgical site is healing well. The contour irregularity may improve with time and the erythema at the suture line will resolve. Additional care was reviewed including liberal application of Vaseline several times daily until next follow-up. Follow up will be in 2 weeks for wound check.

## 2019-02-15 ENCOUNTER — OFFICE VISIT (OUTPATIENT)
Dept: DERMATOLOGY | Facility: AMBULATORY SURGERY CENTER | Age: 69
End: 2019-02-15

## 2019-02-15 VITALS
RESPIRATION RATE: 15 BRPM | BODY MASS INDEX: 26.5 KG/M2 | OXYGEN SATURATION: 99 % | DIASTOLIC BLOOD PRESSURE: 88 MMHG | HEART RATE: 95 BPM | WEIGHT: 144 LBS | SYSTOLIC BLOOD PRESSURE: 130 MMHG | TEMPERATURE: 98.4 F | HEIGHT: 62 IN

## 2019-02-15 DIAGNOSIS — C44.729 SQUAMOUS CELL CARCINOMA, LEG, LEFT: Primary | ICD-10-CM

## 2019-02-15 NOTE — PROGRESS NOTES
Wound check/suture removal:    Chief complaint: wound check. HPI: Romie Gilmore presents for wound check following Mohs surgery for biopsy-proven squamous cell carcinoma of the left shin repaired with a keystone flap performed on 1/17/2019. The patient has had no problems with the surgical site since her last visit. Exam: The surgical site was examined. There is not evidence of infection. There is mild erythema of the suture lines. There is not edema. There is mild contour deformity of the superolateral portion of the wound. There is mild underlying nodularity from absorbing suture material.    A/P:  Wound check. The surgical site is healing well. Additional care was reviewed including application of Vaseline for 1-2 more weeks. .  Follow up will be in about 5 months for total body skin exam.

## 2019-05-05 DIAGNOSIS — I10 ESSENTIAL HYPERTENSION: ICD-10-CM

## 2019-05-05 RX ORDER — FELODIPINE 5 MG/1
TABLET, EXTENDED RELEASE ORAL
Qty: 90 TAB | Refills: 1 | Status: SHIPPED | OUTPATIENT
Start: 2019-05-05 | End: 2019-08-22 | Stop reason: SDUPTHER

## 2019-05-05 RX ORDER — BUPROPION HYDROCHLORIDE 150 MG/1
TABLET, EXTENDED RELEASE ORAL
Qty: 180 TAB | Refills: 1 | Status: SHIPPED | OUTPATIENT
Start: 2019-05-05 | End: 2019-11-03 | Stop reason: SDUPTHER

## 2019-07-08 ENCOUNTER — OFFICE VISIT (OUTPATIENT)
Dept: INTERNAL MEDICINE CLINIC | Age: 69
End: 2019-07-08

## 2019-07-08 ENCOUNTER — HOSPITAL ENCOUNTER (OUTPATIENT)
Dept: LAB | Age: 69
Discharge: HOME | End: 2019-07-08
Payer: MEDICARE

## 2019-07-08 VITALS
WEIGHT: 143 LBS | SYSTOLIC BLOOD PRESSURE: 161 MMHG | HEIGHT: 62 IN | RESPIRATION RATE: 20 BRPM | OXYGEN SATURATION: 97 % | DIASTOLIC BLOOD PRESSURE: 97 MMHG | BODY MASS INDEX: 26.31 KG/M2 | TEMPERATURE: 98.1 F | HEART RATE: 97 BPM

## 2019-07-08 DIAGNOSIS — R82.90 ABNORMAL URINE: ICD-10-CM

## 2019-07-08 DIAGNOSIS — N39.0 URINARY TRACT INFECTION WITH HEMATURIA, SITE UNSPECIFIED: Primary | ICD-10-CM

## 2019-07-08 DIAGNOSIS — R31.9 URINARY TRACT INFECTION WITH HEMATURIA, SITE UNSPECIFIED: Primary | ICD-10-CM

## 2019-07-08 DIAGNOSIS — N89.8 VAGINAL DRYNESS: ICD-10-CM

## 2019-07-08 LAB
BILIRUB UR QL STRIP: NEGATIVE
GLUCOSE UR-MCNC: NEGATIVE MG/DL
KETONES P FAST UR STRIP-MCNC: NEGATIVE MG/DL
PH UR STRIP: 7 [PH] (ref 4.6–8)
PROT UR QL STRIP: NEGATIVE
SP GR UR STRIP: 1.02 (ref 1–1.03)
UA UROBILINOGEN AMB POC: NORMAL (ref 0.2–1)
URINALYSIS CLARITY POC: NORMAL
URINALYSIS COLOR POC: YELLOW
URINE BLOOD POC: NORMAL
URINE LEUKOCYTES POC: NORMAL
URINE NITRITES POC: POSITIVE

## 2019-07-08 PROCEDURE — 87086 URINE CULTURE/COLONY COUNT: CPT

## 2019-07-08 PROCEDURE — 87186 SC STD MICRODIL/AGAR DIL: CPT

## 2019-07-08 PROCEDURE — 87088 URINE BACTERIA CULTURE: CPT

## 2019-07-08 RX ORDER — NITROFURANTOIN 25; 75 MG/1; MG/1
100 CAPSULE ORAL 2 TIMES DAILY
Qty: 10 CAP | Refills: 0 | Status: SHIPPED | OUTPATIENT
Start: 2019-07-08 | End: 2019-08-22 | Stop reason: ALTCHOICE

## 2019-07-08 NOTE — PROGRESS NOTES
HISTORY OF PRESENT ILLNESS  Clydie L Marylene Oakland is a 80 y.o. female. HPI  Patient reports she recently became sexually active. She has had vaginal bleeding on occasion. She states she had a little light spotting. She reports she has not had a yeast infection. Vaginal area burns. When she urinate she gets a tingling effect. No abdominal pain. She has noted some bilateral rib discomfort. She has noticed this ith cogging. She has tried otc vaginal cream for dryness. She is not sure if that is the cause. She has stopped using that. No vaginal discharge. Allergies   Allergen Reactions    Codeine Nausea and Vomiting    Fosamax [Alendronate] Nausea Only    Keflex [Cephalexin] Angioedema     Past Medical History:   Diagnosis Date    Aortic calcification ([facility] [address] 1/23/2014    Degenerative disc disease 5/23/2012    neck    Family history of skin cancer     Hepatitis C antibody test positive 7/14/2015    Hypertension     Lipoma of back 6/15/2012    Mood swings     mood swings with menopause    Osteoporosis 12/7/2010    Raynaud's disease     Sunburn, blistering     Tanning bed exposure        Review of Systems   Constitutional: Negative for chills and fever. Gastrointestinal: Negative for abdominal pain, nausea and vomiting. Genitourinary: Positive for hematuria. Negative for dysuria, flank pain, frequency and urgency. Blood pressure (!) 161/97, pulse 97, temperature 98.1 °F (36.7 °C), temperature source Oral, resp. rate 20, height 5' 2\" (1.575 m), weight 143 lb (64.9 kg), SpO2 97 %. Physical Exam   Constitutional: No distress. Abdominal: Soft. There is no tenderness. Genitourinary: Vagina normal and uterus normal. No vaginal discharge found.      Results for orders placed or performed in visit on 07/08/19   AMB POC URINALYSIS DIP STICK MANUAL W/O MICRO   Result Value Ref Range    Color (UA POC) Yellow     Clarity (UA POC) Slightly Cloudy     Glucose (UA POC) Negative Negative    Bilirubin (UA POC) Negative Negative    Ketones (UA POC) Negative Negative    Specific gravity (UA POC) 1.020 1.001 - 1.035    Blood (UA POC) 3+ Negative    pH (UA POC) 7.0 4.6 - 8.0    Protein (UA POC) Negative Negative    Urobilinogen (UA POC) 0.2 mg/dL 0.2 - 1    Nitrites (UA POC) Positive Negative    Leukocyte esterase (UA POC) 1+ Negative       ASSESSMENT and PLAN  Diagnoses and all orders for this visit:    1. Urinary tract infection with hematuria, site unspecified  -     nitrofurantoin, macrocrystal-monohydrate, (MACROBID) 100 mg capsule; Take 1 Cap by mouth two (2) times a day. 2. Abnormal urine  -     CULTURE, URINE  -     AMB POC URINALYSIS DIP STICK MANUAL W/O MICRO    3. Vaginal dryness          I explained to the patient that her vaginal exam appeared normal. She will be treated for a uti and she will be contact with the results of the urine culture once back. We talked about the importance of getting up and urinating after intercourse to help prevent UTI's. Also I have suggested she check at the pharmacy to get something such as replenish to help with vaginal dryness. All this was discussed with the patient and she understands and agrees. Total encounter time was 25 minutes; >50 percent was spent counseling and coordinating care regarding urinary tract infection and vaginal dryness.

## 2019-07-08 NOTE — PROGRESS NOTES
Patient c/o vaginal discomfort, some blood with wiping, no discharge, no odor, no itching. No urinary burning, no color change, no odor. Patient states she is getting an all over sensation of tingling from head all the way down the body ends in the finger tips.

## 2019-07-12 LAB — BACTERIA UR CULT: ABNORMAL

## 2019-07-12 NOTE — PROGRESS NOTES
Please let the patient know e.coli grew on culture.  The antibiotic prescribed should cover the infection thanks

## 2019-07-12 NOTE — PROGRESS NOTES
Writer contacted patient to inform of lab results and instruction per Sesar Poe, patient verbalized understanding, patient states she still feels like her ribs are bruised, but ok for now will follow up with Dr. Darya Albrecht in needed.

## 2019-08-19 ENCOUNTER — OFFICE VISIT (OUTPATIENT)
Dept: DERMATOLOGY | Facility: AMBULATORY SURGERY CENTER | Age: 69
End: 2019-08-19

## 2019-08-19 ENCOUNTER — HOSPITAL ENCOUNTER (OUTPATIENT)
Dept: LAB | Age: 69
Discharge: HOME | End: 2019-08-19
Payer: MEDICARE

## 2019-08-19 VITALS
WEIGHT: 143 LBS | DIASTOLIC BLOOD PRESSURE: 68 MMHG | HEART RATE: 92 BPM | BODY MASS INDEX: 26.31 KG/M2 | OXYGEN SATURATION: 97 % | SYSTOLIC BLOOD PRESSURE: 130 MMHG | TEMPERATURE: 98.7 F | RESPIRATION RATE: 14 BRPM | HEIGHT: 62 IN

## 2019-08-19 DIAGNOSIS — L72.0 MILIAL CYST: ICD-10-CM

## 2019-08-19 DIAGNOSIS — L70.0 OPEN COMEDONE: ICD-10-CM

## 2019-08-19 DIAGNOSIS — L81.4 LENTIGINES: Primary | ICD-10-CM

## 2019-08-19 DIAGNOSIS — D23.9 DERMATOFIBROMA: ICD-10-CM

## 2019-08-19 DIAGNOSIS — D22.9 MULTIPLE BENIGN NEVI: ICD-10-CM

## 2019-08-19 DIAGNOSIS — L82.1 SEBORRHEIC KERATOSES: ICD-10-CM

## 2019-08-19 DIAGNOSIS — Z85.828 HISTORY OF NONMELANOMA SKIN CANCER: ICD-10-CM

## 2019-08-19 PROCEDURE — 80061 LIPID PANEL: CPT

## 2019-08-19 PROCEDURE — 85025 COMPLETE CBC W/AUTO DIFF WBC: CPT

## 2019-08-19 PROCEDURE — 36415 COLL VENOUS BLD VENIPUNCTURE: CPT

## 2019-08-19 PROCEDURE — 80053 COMPREHEN METABOLIC PANEL: CPT

## 2019-08-19 NOTE — PROGRESS NOTES
Written by Evonne Arzola, as dictated by Antoinette Montgomery, [address]. Name: Med Noriega       Age: 80 y.o. Date: 8/19/2019    Chief Complaint:   Chief Complaint   Patient presents with    Skin Exam     6 month skin exam. Areas of concern: Lesion on left upper eyelid, lesions on head, small bump along suture line on Left shin r/t Mohs       Subjective:    HPI  Ms. French Caballero is a 80 y.o. female who presents for a full skin exam.  The patient's last skin exam was on 8/19/19 and the patient does have current complaints related to her skin. The patient reports that she had a nodule appear at the site of recent Mohs surgery on the left lower leg. She says that it has stayed the same, no changes in size. She also notes a lesion on the eye that she is concerned about - noted today when she awoke. She also notes bumps in her scalp that she believes came from sweating in the scalp - last many weeks and move around. She is feeling well and in her usual state of health today. She has no current illnesses, no other skin concerns. Her allergies, medications, medical, and social history are reviewed by me today. The patient's pertinent skin history includes : NMSC  -SCC, left leg, Mohs, 1/17/19. Closed with keystone flap    ROS: Constitutional: Negative.     Dermatological : negative      Social History     Socioeconomic History    Marital status:      Spouse name: Not on file    Number of children: Not on file    Years of education: Not on file    Highest education level: Not on file   Occupational History    Not on file   Social Needs    Financial resource strain: Not on file    Food insecurity:     Worry: Not on file     Inability: Not on file    Transportation needs:     Medical: Not on file     Non-medical: Not on file   Tobacco Use    Smoking status: Current Every Day Smoker     Packs/day: 0.25     Years: 25.00     Pack years: 6.25     Types: Cigarettes    Smokeless tobacco: Former User     Quit date: 2014   Substance and Sexual Activity    Alcohol use:  Yes     Alcohol/week: 2.5 standard drinks     Types: 3 Glasses of wine per week     Comment: occasionally    Drug use: No    Sexual activity: Not Currently   Lifestyle    Physical activity:     Days per week: Not on file     Minutes per session: Not on file    Stress: Not on file   Relationships    Social connections:     Talks on phone: Not on file     Gets together: Not on file     Attends [employer] service: Not on file     Active member of club or organization: Not on file     Attends meetings of clubs or organizations: Not on file     Relationship status: Not on file    Intimate partner violence:     Fear of current or ex partner: Not on file     Emotionally abused: Not on file     Physically abused: Not on file     Forced sexual activity: Not on file   Other Topics Concern    Not on file   Social History Narrative    Not on file       Family History   Problem Relation Age of Onset    Parkinsonism Mother     Hypertension Father     Diabetes Father     Heart Disease Father 61               Past Medical History:   Diagnosis Date    Aortic calcification ([address] 2014    Degenerative disc disease 2012    neck    Family history of skin cancer     Hepatitis C antibody test positive 2015    Hypertension     Lipoma of back 6/15/2012    Mood swings     mood swings with menopause    Osteoporosis 2010    Raynaud's disease     Sunburn, blistering     Tanning bed exposure        Past Surgical History:   Procedure Laterality Date    HX  SECTION  6438,8977,3775     x 3    HX CHOLECYSTECTOMY  1974       Current Outpatient Medications   Medication Sig Dispense Refill    atorvastatin (LIPITOR) 10 mg tablet TAKE 1 TABLET BY MOUTH EVERY DAY IN THE EVENING 30 Tab 0    felodipine (PLENDIL SR) 5 mg 24 hr tablet TAKE 1 TABLET BY MOUTH EVERY DAY 90 Tab 1    buPROPion SR (WELLBUTRIN SR) 150 mg SR tablet TAKE 1 TABLET BY MOUTH TWICE A  Tab 1    buPROPion XL (WELLBUTRIN XL) 300 mg XL tablet Take 1 Tab by mouth every morning. 90 Tab 1    aspirin 81 mg chewable tablet Take 81 mg by mouth daily.  nitrofurantoin, macrocrystal-monohydrate, (MACROBID) 100 mg capsule Take 1 Cap by mouth two (2) times a day. 10 Cap 0    oxyCODONE-acetaminophen (PERCOCET) 5-325 mg per tablet Take 1 Tab by mouth every four (4) hours as needed for Pain for up to 30 doses. Max Daily Amount: 6 Tabs. 30 Tab 0    ibuprofen (MOTRIN) 600 mg tablet Take 1 Tab by mouth every six (6) hours as needed for Pain. Indications: Pain 100 Tab 1    felodipine (PLENDIL SR) 5 mg 24 hr tablet TAKE 1 TABLET BY MOUTH EVERY DAY 90 Tab 1    CHOLECALCIFEROL, VITAMIN D3, (VITAMIN D3 PO) Take 2,000 Units by mouth daily. Allergies   Allergen Reactions    Codeine Nausea and Vomiting    Fosamax [Alendronate] Nausea Only    Keflex [Cephalexin] Angioedema         Objective:    Visit Vitals  /68   Pulse 92   Temp 98.7 °F (37.1 °C) (Oral)   Resp 14   Ht 5' 2\" (1.575 m)   Wt 143 lb (64.9 kg)   SpO2 97%   BMI 26.16 kg/m²       French Villareal is a 80 y.o. female who appears well and in no distress. She is awake, alert, and oriented. A skin examination was performed including her scalp, face (including eyelids), ears, neck, chest, back, abdomen, upper extremities (including digits/nails), lower extremities, breasts, buttocks; genital skin was not examined. She has a 6 x 6 mm firm skin toned papule with pink peripheral rim on the left lower leg at the inferior and lateral aspect of the key flap, including the lesion of her concern - possibly a dermatofibroma. She has an open comedone in the scar on the left lower leg. She has 1 mm papules consistent with a milia cysts on the forehead. She has pink papule with white center pustule on the left upper eyelid just above the lash margin.  She has an area of crusting and pink macule at the left occipital scalp most consistent with inflammatory lesion/ acne necrotica. She has pink intradermal nevi and brown junctional nevi, no concerning features for severe atypia. There are lentigines on sun exposed areas. She has scattered waxy macules and keratotic papules consistent with seborrheic keratoses. She has a pink and brown firm papule most consistent with dermatofibroma on the right thigh. Assessment/Plan:  1. Personal history of nonmelanoma skin cancer. I discussed sun protection, sunscreen use, the warning signs of skin cancer, the need for self-skin examinations, and the need for regular practitioner exams. The patient should follow up sooner as needed if new, changing, or symptomatic skin lesions arise. 2. Open comedone. The diagnosis was discussed and the patient desires removal. After verbal permission, the material was successfully extracted with a comedone extractor. 3. Milia. The diagnosis was discussed. The patient was reassured that no treatment is necessary at this time. 4. Normal nevi. The diagnosis of normal nevi was reviewed. I discussed sun protection, sunscreen use, the warning signs of skin cancer, mole monitoring, the need for self-skin examinations, and the need for regular practitioner exams. The patient should follow up sooner as needed if new, changing, or symptomatic skin lesions arise. 5. Solar lentigos. The diagnosis and relationship to sun exposure was reviewed. Sun protection advised. 6. Seborrheic keratoses. The diagnosis was reviewed and the patient was reassured that no treatment is needed for these benign lesions. 7. Dermatofibroma. The diagnosis was reviewed and the patient was reassured that no treatment is needed for these benign conditions at this time. The patient should follow up should the lesion change or become symptomatic.    8. Inflammatory papule/ pustule of eyelid.  The patient was advised to apply warm compresses to the right upper eyelid to promote healing in the area. 9. Skin lesion left lower leg. Favor scar like - discussed observation and if growth or change occurs (pt reports stability since noting about 1 month post op) will need bx and a sooner appt. Otherwise will measure and evaluate again in 6 months. Selsun blue was recommended for her to apply to the scalp. Next skin exam:  6 months      This plan was reviewed with the patient and patient agrees. All questions were answered. This scribe documentation was reviewed by me and accurately reflects the examination and decisions made by me.

## 2019-08-19 NOTE — PATIENT INSTRUCTIONS
Chief Complaint   Patient presents with    Skin Exam     6 month skin exam. Areas of concern: Lesion on left upper eyelid, lesions on head, small bump along suture line on Left shin r/t Mohs     Self Skin Exam and Sunscreens    Early detection and treatment is essential in the treatment of all forms of skin cancer. If caught early, all forms of skin cancer are curable. In addition to your regular visits, you should perform a monthly skin examination. Over time, you become familiar with what is normally found on your skin and can identify new or suspicious spots. One of the screening tools you can use to assess your skin is to follow the ABCDEs:    A= Asymmetry (One half is unlike the other half)     B= Border (An irregular, scalloped or poorly defined edge)    C= Color (Is varied from one area to another, has shades of tan, brown/ black, white, red or blue)    D= Diameter (Spots larger than 6mm or a pencil eraser)    E= Evolving (New spots or one that is changing in size, shape, or color)    A follow- up interval will be customized based on your history of skin cancer or level of skin damage and risk factors. In any case, if you notice a suspicious or new spot, an appointment should be arranged between regular visits. Everyone should use sunscreen and sun-safe practices, which is especially important for those with a personal or family history of skin cancer. Suggestions for this include:    1. Use daily moisturizers containing SPF 30 or higher. 2. Wear long sleeve clothing with UPF ratings and a broad-brimmed hat. 3. Apply sunscreen with SPF 30 or higher to all sun exposed areas if you are going to be in the sun. A broad spectrum UVA/ UVB sunscreen is best.  Dont forget to REAPPLY every two hours or more often if swimming or sweating! 4. Avoid outside activities during peak sun hours, especially in the summer (10am- 2pm). 5. DO NOT use tanning beds.     Using sunscreen and sun-safe practices can help reduce the likelihood of developing skin cancer or additional skin cancers in those previously diagnosed.

## 2019-08-22 ENCOUNTER — OFFICE VISIT (OUTPATIENT)
Dept: INTERNAL MEDICINE CLINIC | Age: 69
End: 2019-08-22

## 2019-08-22 VITALS
HEART RATE: 77 BPM | BODY MASS INDEX: 26.5 KG/M2 | RESPIRATION RATE: 18 BRPM | TEMPERATURE: 96.4 F | DIASTOLIC BLOOD PRESSURE: 82 MMHG | SYSTOLIC BLOOD PRESSURE: 140 MMHG | HEIGHT: 62 IN | OXYGEN SATURATION: 98 % | WEIGHT: 144 LBS

## 2019-08-22 DIAGNOSIS — Z00.00 MEDICARE ANNUAL WELLNESS VISIT, SUBSEQUENT: ICD-10-CM

## 2019-08-22 DIAGNOSIS — I10 ESSENTIAL HYPERTENSION: ICD-10-CM

## 2019-08-22 DIAGNOSIS — Z12.39 BREAST CANCER SCREENING: ICD-10-CM

## 2019-08-22 DIAGNOSIS — I70.0 AORTIC CALCIFICATION (HCC): ICD-10-CM

## 2019-08-22 DIAGNOSIS — Z72.0 TOBACCO ABUSE: ICD-10-CM

## 2019-08-22 DIAGNOSIS — E78.5 HYPERLIPIDEMIA, UNSPECIFIED HYPERLIPIDEMIA TYPE: ICD-10-CM

## 2019-08-22 DIAGNOSIS — Z12.11 COLON CANCER SCREENING: Primary | ICD-10-CM

## 2019-08-22 NOTE — PROGRESS NOTES
+1. Have you been to the ER, urgent care clinic since your last visit? Hospitalized since your last visit? No    2. Have you seen or consulted any other health care providers outside of the [address] [place] [address] since your last visit? Include any pap smears or colon screening. Yes.   Dermatology-dr Elinor Whitley

## 2019-08-22 NOTE — PROGRESS NOTES
This is the Subsequent Medicare Annual Wellness Exam, performed 12 months or more after the Initial AWV or the last Subsequent AWV    I have reviewed the patient's medical history in detail and updated the computerized patient record. History     Past Medical History:   Diagnosis Date    Aortic calcification (HCC) 2014    Degenerative disc disease 2012    neck    Family history of skin cancer     Hepatitis C antibody test positive 2015    Hypertension     Lipoma of back 6/15/2012    Mood swings     mood swings with menopause    Osteoporosis 2010    Raynaud's disease     Sunburn, blistering     Tanning bed exposure       Past Surgical History:   Procedure Laterality Date    HX  SECTION  5640,7262,0945     x 3    HX CHOLECYSTECTOMY       Current Outpatient Medications   Medication Sig Dispense Refill    varicella-zoster recombinant, PF, (SHINGRIX, PF,) 50 mcg/0.5 mL susr injection 0.5mL by IntraMUSCular route once now and then repeat in 2-6 months 0.5 mL 1    atorvastatin (LIPITOR) 10 mg tablet TAKE 1 TABLET BY MOUTH EVERY DAY IN THE EVENING 30 Tab 0    buPROPion SR (WELLBUTRIN SR) 150 mg SR tablet TAKE 1 TABLET BY MOUTH TWICE A  Tab 1    ibuprofen (MOTRIN) 600 mg tablet Take 1 Tab by mouth every six (6) hours as needed for Pain. Indications: Pain 100 Tab 1    felodipine (PLENDIL SR) 5 mg 24 hr tablet TAKE 1 TABLET BY MOUTH EVERY DAY 90 Tab 1    aspirin 81 mg chewable tablet Take 81 mg by mouth daily.  CHOLECALCIFEROL, VITAMIN D3, (VITAMIN D3 PO) Take 2,000 Units by mouth daily.        Allergies   Allergen Reactions    Codeine Nausea and Vomiting    Fosamax [Alendronate] Nausea Only    Keflex [Cephalexin] Angioedema     Family History   Problem Relation Age of Onset    Parkinsonism Mother     Hypertension Father     Diabetes Father     Heart Disease Father 61             Social History     Tobacco Use    Smoking status: Current Every Day Smoker     Packs/day: 0.25     Years: 25.00     Pack years: 6.25     Types: Cigarettes    Smokeless tobacco: Former User     Quit date: 1/27/2014   Substance Use Topics    Alcohol use: Yes     Alcohol/week: 2.5 standard drinks     Types: 3 Glasses of wine per week     Comment: occasionally     Patient Active Problem List   Diagnosis Code    Hypertension I10    Raynaud's disease I73.00    Depression F32.9    Osteoporosis M81.0    HLD (hyperlipidemia) E78.5    Degenerative disc disease [id number]    Lipoma of back D17.1    Aortic calcification (HCC) I70.0    Hepatitis C antibody test positive R76.8    Tobacco abuse Z72.0       Depression Risk Factor Screening:     3 most recent PHQ Screens 7/8/2019   Little interest or pleasure in doing things Not at all   Feeling down, depressed, irritable, or hopeless Not at all   Total Score PHQ 2 0     Alcohol Risk Factor Screening: You do not drink alcohol or very rarely. Functional Ability and Level of Safety:   Hearing Loss  Hearing is good. Activities of Daily Living  The home contains: no safety equipment. Patient does total self care    Fall Risk  Fall Risk Assessment, last 12 mths 8/22/2019   Able to walk? Yes   Fall in past 12 months? No   Fall with injury? -   Number of falls in past 12 months -   Fall Risk Score -       Abuse Screen  Patient is not abused    Cognitive Screening   Evaluation of Cognitive Function:  Has your family/caregiver stated any concerns about your memory: no  Normal    Patient Care Team   Patient Care Team:  Tramaine Guajardo MD as PCP - General  Coleen Choudhary MD (Cardiology)    Assessment/Plan   Education and counseling provided:  Are appropriate based on today's review and evaluation    Diagnoses and all orders for this visit:    1. Colon cancer screening  -     REFERRAL TO GASTROENTEROLOGY    2. Breast cancer screening  -     DEE MAMMO BI SCREENING INCL CAD; Future    3.  Medicare annual wellness visit, subsequent  - varicella-zoster recombinant, PF, (SHINGRIX, PF,) 50 mcg/0.5 mL susr injection; 0.5mL by IntraMUSCular route once now and then repeat in 2-6 months        Health Maintenance Due   Topic Date Due    COLONOSCOPY  12/10/1969    Shingrix Vaccine Age 50> (1 of 2) 12/10/2001    BREAST CANCER SCRN MAMMOGRAM  12/10/2001    DTaP/Tdap/Td series (1 - Tdap) 01/03/2008    GLAUCOMA SCREENING Q2Y  12/10/2016    Pneumococcal 65+ years (2 of 2 - PPSV23) 11/01/2018    MEDICARE YEARLY EXAM  12/14/2018    Influenza Age 9 to Adult  08/01/2019     SUBJECTIVE: French Duke is a 80 y.o. female seen for a follow up visit; she has hypertension and hyperlipidemia. Current Outpatient Medications   Medication Sig Dispense Refill    varicella-zoster recombinant, PF, (SHINGRIX, PF,) 50 mcg/0.5 mL susr injection 0.5mL by IntraMUSCular route once now and then repeat in 2-6 months 0.5 mL 1    atorvastatin (LIPITOR) 10 mg tablet TAKE 1 TABLET BY MOUTH EVERY DAY IN THE EVENING 30 Tab 0    buPROPion SR (WELLBUTRIN SR) 150 mg SR tablet TAKE 1 TABLET BY MOUTH TWICE A  Tab 1    ibuprofen (MOTRIN) 600 mg tablet Take 1 Tab by mouth every six (6) hours as needed for Pain. Indications: Pain 100 Tab 1    felodipine (PLENDIL SR) 5 mg 24 hr tablet TAKE 1 TABLET BY MOUTH EVERY DAY 90 Tab 1    aspirin 81 mg chewable tablet Take 81 mg by mouth daily.  CHOLECALCIFEROL, VITAMIN D3, (VITAMIN D3 PO) Take 2,000 Units by mouth daily.        Patient Active Problem List   Diagnosis Code    Hypertension I10    Raynaud's disease I73.00    Depression F32.9    Osteoporosis M81.0    HLD (hyperlipidemia) E78.5    Degenerative disc disease [id number]    Lipoma of back D17.1    Aortic calcification (HCC) I70.0    Hepatitis C antibody test positive R76.8    Tobacco abuse Z72.0     System Review: Cardiovascular ROS - not taking medications regularly as instructed, no medication side effects noted, side effects noted by patient include no medication side effects noted and myalgias, no TIA's, no chest pain on exertion, no dyspnea on exertion, no swelling of ankles, CNS ROS - no TIA or stroke-like symptoms, no amaurosis, diplopia, abnormal speech, unilateral numbness or weakness. New concerns: on infrequent statin. Vitals:    08/22/19 1347 08/22/19 1405   BP: (!) 145/91 140/82   Pulse: 77    Resp: 18    Temp: 96.4 °F (35.8 °C)    TempSrc: Oral    SpO2: 98%    Weight: 144 lb (65.3 kg)    Height: 5' 2\" (1.575 m)        OBJECTIVE:  Visit Vitals  /82   Pulse 77   Temp 96.4 °F (35.8 °C) (Oral)   Resp 18   Ht 5' 2\" (1.575 m)   Wt 144 lb (65.3 kg)   SpO2 98%   BMI 26.34 kg/m²      Appearance: alert, well appearing, and in no distress and oriented to person, place, and time. General exam: CVS exam BP noted to be borderline elevated today in office, S1, S2 normal, no gallop, no murmur, chest clear, no JVD, no HSM, no edema. Lab review: labs reviewed, I note that lipids LDL result does not yet meet goal.     ASSESSMENT:  hypertension borderline controlled, hyperlipidemia needs further observation, needs improvement. PLAN:  1. Colon cancer screening  due  - REFERRAL TO GASTROENTEROLOGY    2. Breast cancer screening  due  - DEE MAMMO BI SCREENING INCL CAD; Future    3. Medicare annual wellness visit, subsequent  Diet reviewed  - varicella-zoster recombinant, PF, (SHINGRIX, PF,) 50 mcg/0.5 mL susr injection; 0.5mL by IntraMUSCular route once now and then repeat in 2-6 months  Dispense: 0.5 mL; Refill: 1    4. Tobacco abuse  Discussed stopping  - CT LOW DOSE LUNG CANCER SCREENING; Future    5. Hyperlipidemia, unspecified hyperlipidemia type  uncontrolled  - atorvastatin (LIPITOR) 10 mg tablet; Take 0.5 Tabs by mouth daily. Dispense: 30 Tab; Refill: 5    6. Essential hypertension  borderline    7.  Aortic calcification (HCC)  A finding of underlying disease

## 2019-08-22 NOTE — PATIENT INSTRUCTIONS
Medicare Wellness Visit, Female     The best way to live healthy is to have a lifestyle where you eat a well-balanced diet, exercise regularly, limit alcohol use, and quit all forms of tobacco/nicotine, if applicable. Regular preventive services are another way to keep healthy. Preventive services (vaccines, screening tests, monitoring & exams) can help personalize your care plan, which helps you manage your own care. Screening tests can find health problems at the earliest stages, when they are easiest to treat. [facility] follows the current, evidence-based guidelines published by the [facility] ([facility]) when recommending preventive services for our patients. Because we follow these guidelines, sometimes recommendations change over time as research supports it. (For example, mammograms used to be recommended annually. Even though Medicare will still pay for an annual mammogram, the newer guidelines recommend a mammogram every two years for women of average risk.)  Of course, you and your doctor may decide to screen more often for some diseases, based on your risk and your health status. Preventive services for you include:  - Medicare offers their members a free annual wellness visit, which is time for you and your primary care provider to discuss and plan for your preventive service needs. Take advantage of this benefit every year!  -All adults over the age of 72 should receive the recommended pneumonia vaccines. Current USPSTF guidelines recommend a series of two vaccines for the best pneumonia protection.   -All adults should have a flu vaccine yearly and a tetanus vaccine every 10 years. All adults age 61 and older should receive a shingles vaccine once in their lifetime.    -A bone mass density test is recommended when a woman turns 65 to screen for osteoporosis. This test is only recommended one time, as a screening.  Some providers will use this same test as a disease monitoring tool if you already have osteoporosis. -All adults age 38-68 who are overweight should have a diabetes screening test once every three years.   -Other screening tests and preventive services for persons with diabetes include: an eye exam to screen for diabetic retinopathy, a kidney function test, a foot exam, and stricter control over your cholesterol.   -Cardiovascular screening for adults with routine risk involves an electrocardiogram (ECG) at intervals determined by your doctor.   -Colorectal cancer screenings should be done for adults age 54-65 with no increased risk factors for colorectal cancer. There are a number of acceptable methods of screening for this type of cancer. Each test has its own benefits and drawbacks. Discuss with your doctor what is most appropriate for you during your annual wellness visit. The different tests include: colonoscopy (considered the best screening method), a fecal occult blood test, a fecal DNA test, and sigmoidoscopy. -Breast cancer screenings are recommended every other year for women of normal risk, age 54-69.  -Cervical cancer screenings for women over age 72 are only recommended with certain risk factors.   -All adults born between [facility] should be screened once for Hepatitis C.      Here is a list of your current Health Maintenance items (your personalized list of preventive services) with a due date:  Health Maintenance Due   Topic Date Due    Colonoscopy  12/10/1969    Shingles Vaccine (1 of 2) 12/10/2001    Mammogram  12/10/2001    DTaP/Tdap/Td  (1 - Tdap) 01/03/2008    Glaucoma Screening   12/10/2016    Pneumococcal Vaccine (2 of 2 - PPSV23) 11/01/2018    Annual Well Visit  12/14/2018    Flu Vaccine  08/01/2019

## 2019-08-23 RX ORDER — ATORVASTATIN CALCIUM 10 MG/1
5 TABLET, FILM COATED ORAL DAILY
Qty: 30 TAB | Refills: 5 | Status: SHIPPED | OUTPATIENT
Start: 2019-08-23 | End: 2020-05-11 | Stop reason: SDUPTHER

## 2019-09-03 ENCOUNTER — HOSPITAL ENCOUNTER (OUTPATIENT)
Dept: GENERAL RADIOLOGY | Age: 69
Discharge: HOME | End: 2019-09-03
Attending: INTERNAL MEDICINE
Payer: MEDICARE

## 2019-09-03 ENCOUNTER — OFFICE VISIT (OUTPATIENT)
Dept: INTERNAL MEDICINE CLINIC | Age: 69
End: 2019-09-03

## 2019-09-03 VITALS
BODY MASS INDEX: 25.95 KG/M2 | HEIGHT: 62 IN | RESPIRATION RATE: 18 BRPM | WEIGHT: 141 LBS | SYSTOLIC BLOOD PRESSURE: 139 MMHG | HEART RATE: 101 BPM | TEMPERATURE: 97.5 F | OXYGEN SATURATION: 98 % | DIASTOLIC BLOOD PRESSURE: 89 MMHG

## 2019-09-03 DIAGNOSIS — S32.020A CLOSED COMPRESSION FRACTURE OF L2 LUMBAR VERTEBRA, INITIAL ENCOUNTER (HCC): Primary | ICD-10-CM

## 2019-09-03 DIAGNOSIS — M54.6 ACUTE BILATERAL THORACIC BACK PAIN: ICD-10-CM

## 2019-09-03 DIAGNOSIS — M54.6 ACUTE BILATERAL THORACIC BACK PAIN: Primary | ICD-10-CM

## 2019-09-03 DIAGNOSIS — E78.5 HYPERLIPIDEMIA, UNSPECIFIED HYPERLIPIDEMIA TYPE: ICD-10-CM

## 2019-09-03 PROCEDURE — 72100 X-RAY EXAM L-S SPINE 2/3 VWS: CPT

## 2019-09-03 PROCEDURE — 72072 X-RAY EXAM THORAC SPINE 3VWS: CPT

## 2019-09-03 RX ORDER — HYDROCODONE BITARTRATE AND ACETAMINOPHEN 5; 325 MG/1; MG/1
2 TABLET ORAL
Qty: 24 TAB | Refills: 0 | Status: SHIPPED | OUTPATIENT
Start: 2019-09-03 | End: 2019-09-05 | Stop reason: SDUPTHER

## 2019-09-03 RX ORDER — ATORVASTATIN CALCIUM 10 MG/1
TABLET, FILM COATED ORAL
Qty: 30 TAB | Refills: 0 | Status: SHIPPED | OUTPATIENT
Start: 2019-09-03 | End: 2019-09-05 | Stop reason: SDUPTHER

## 2019-09-03 NOTE — PROGRESS NOTES
1. Have you been to the ER, urgent care clinic since your last visit? Hospitalized since your last visit? No    2. Have you seen or consulted any other health care providers outside of the [address] [place] [address] since your last visit? Include any pap smears or colon screening.  No

## 2019-09-03 NOTE — PROGRESS NOTES
Chief Complaint   Patient presents with    Other     injured back      HAS PAST HX OF THORACIC COMPRESSION FROM OSTEOPOROSIS  This weekend lifted patient and severe non radiating pain tried 600 mg ibuprofen and tramadol 50 no relief  Some nausea  Non radiating pain  Cannot sit or lie down  No bowel or bladder issuezs    Patient Active Problem List    Diagnosis    Tobacco abuse    Hepatitis C antibody test positive     Likely from blood transfusion        Aortic calcification (HCC)    Lipoma of back    Degenerative disc disease    HLD (hyperlipidemia)    Osteoporosis     Never took osteoporosis medication due to worry about side effects      Hypertension    Raynaud's disease    Depression     Past Surgical History:   Procedure Laterality Date    HX  SECTION  8939,[id number],4946     x 3    HX CHOLECYSTECTOMY  1974    NEUROLOGICAL PROCEDURE UNLISTED      synovial cyst t spine       In distress    Vitals:    19 1357   BP: 139/89   Pulse: (!) 101   Resp: 18   Temp: 97.5 °F (36.4 °C)   TempSrc: Oral   SpO2: 98%   Weight: 141 lb (64 kg)   Height: 5' 2\" (1.575 m)         Point tender t spine at bra line  No leg weakness    Pain left thigh with lifting      Diagnoses and all orders for this visit:    1. Acute bilateral thoracic back pain  -     XR SPINE THORAC 3 V; Future  -     XR SPINE LUMB 2 OR 3 V; Future  -     HYDROcodone-acetaminophen (NORCO) 5-325 mg per tablet; Take 2 Tabs by mouth every six (6) hours as needed for Pain for up to 3 days. Max Daily Amount: 8 Tabs.       [place] monitoring program reviewed and no discrepancies are noted, this is reviewed with patient

## 2019-09-04 ENCOUNTER — HOSPITAL ENCOUNTER (OUTPATIENT)
Dept: MRI IMAGING | Age: 69
Discharge: HOME | End: 2019-09-04
Attending: INTERNAL MEDICINE
Payer: MEDICARE

## 2019-09-04 DIAGNOSIS — S32.020A CLOSED COMPRESSION FRACTURE OF L2 LUMBAR VERTEBRA, INITIAL ENCOUNTER (HCC): ICD-10-CM

## 2019-09-04 PROCEDURE — 72148 MRI LUMBAR SPINE W/O DYE: CPT

## 2019-09-04 NOTE — PROGRESS NOTES
Called pt and gave her her x-ray results and the number for central scheduling for her MRI.   She expressed understanding and had no further questions

## 2019-09-05 ENCOUNTER — OFFICE VISIT (OUTPATIENT)
Dept: INTERNAL MEDICINE CLINIC | Age: 69
End: 2019-09-05

## 2019-09-05 VITALS
HEIGHT: 62 IN | DIASTOLIC BLOOD PRESSURE: 75 MMHG | OXYGEN SATURATION: 100 % | SYSTOLIC BLOOD PRESSURE: 135 MMHG | TEMPERATURE: 97 F | BODY MASS INDEX: 26.87 KG/M2 | HEART RATE: 71 BPM | WEIGHT: 146 LBS | RESPIRATION RATE: 16 BRPM

## 2019-09-05 DIAGNOSIS — S32.020K: Primary | ICD-10-CM

## 2019-09-05 DIAGNOSIS — M54.6 ACUTE BILATERAL THORACIC BACK PAIN: ICD-10-CM

## 2019-09-05 RX ORDER — HYDROCODONE BITARTRATE AND ACETAMINOPHEN 5; 325 MG/1; MG/1
1 TABLET ORAL
Qty: 30 TAB | Refills: 0 | Status: SHIPPED | OUTPATIENT
Start: 2019-09-05 | End: 2019-09-10

## 2019-09-05 NOTE — PROGRESS NOTES
Chief Complaint   Patient presents with    Other     MRI results     HAS PAST HX OF THORACIC COMPRESSION FROM OSTEOPOROSIS  This weekend lifted patient and severe non radiating pain tried 600 mg ibuprofen and tramadol 50 no relief  Some nausea  Non radiating pain  Cannot sit or lie down  No bowel or bladder issues  Using 2 every 5 to 6 hours hydrocodone    Patient Active Problem List    Diagnosis    Tobacco abuse    Hepatitis C antibody test positive     Likely from blood transfusion        Aortic calcification (HCC)    Lipoma of back    Degenerative disc disease    HLD (hyperlipidemia)    Osteoporosis     Never took osteoporosis medication due to worry about side effects      Hypertension    Raynaud's disease    Depression     Past Surgical History:   Procedure Laterality Date    HX  SECTION  3583,9502,1975     x 3    HX CHOLECYSTECTOMY      NEUROLOGICAL PROCEDURE UNLISTED      synovial cyst t spine       In distress    Vitals:    19 0754   BP: 135/75   Pulse: 71   Resp: 16   Temp: 97 °F (36.1 °C)   TempSrc: Oral   SpO2: 100%   Weight: 146 lb (66.2 kg)   Height: 5' 2\" (1.575 m)         Point tender t spine at bra line  No leg weakness    Pain left thigh with lifting  XR Results (most recent):  Results from Hospital Encounter encounter on 19   XR SPINE LUMB 2 OR 3 V    Narrative EXAM: XR SPINE LUMB 2 OR 3 V    INDICATION: Back pain    COMPARISON: None. FINDINGS: AP, lateral and spot lateral views of the lumbar spine. The bones are severely osteopenic. There is mild dextroconvex scoliosis centered  at L1. There is a mild compression deformity of L2, new since 5/15/2017. There  is grade 1 anterolisthesis of L4 on L5 measuring 3 mm which is new as well. Mild  disc space narrowing is again seen at L3-4. Impression IMPRESSION:   1. Age-indeterminate mild compression deformity of L2, new since 5/15/2017. MRI  could be used for further evaluation.   2. New grade 1 anterolisthesis of L4 on L5.  3. Unchanged mild spondylosis at L3-4. MRI Results (most recent):  Results from [place] encounter on 09/04/19   MRI LUMB SPINE WO CONT    Narrative EXAM:  MRI LUMB SPINE WO CONT    INDICATION:   compression    COMPARISON: MRI lumbar spine 5/15/2017, lumbar spine radiograph 9/3/2019. TECHNIQUE: Multiplanar multisequence acquisition without contrast of the lumbar  spine. CONTRAST: None. FINDINGS:  The last well-formed disk is designated as L5-S1 for the purpose of this report. Vertebral bodies were numbered using this convention. Acute anterior compression fracture of L2 with mild height loss, approximately  15%. No retropulsion. No other acute fractures identified with remaining  vertebral body heights preserved. Dextroscoliosis of the lumbar spine centered at L2-L3. Grade 1 anterolisthesis  of L4 on L5 measuring 2 mm. Other than the acute fracture, marrow signal is  normal. Multilevel degenerative disc disease and facet arthropathy as detailed  below, overall not significant changed since prior exam. The conus is normal in  size and signal and terminates at L1-L2. The cauda equina is unremarkable. Visualized soft tissues are unremarkable. T12-L1: No significant disc herniation, spinal canal or neural foraminal  stenosis. L1-L2: No significant disc herniation, spinal canal or neural foraminal  stenosis. L2-L3: Minimal diffuse disc bulge eccentric to the left. No significant spinal  canal stenosis. Mild left and no right neural foraminal stenosis. L3-L4: Diffuse disc bulge. Mild spinal canal stenosis. Mild left and no right  neural foraminal stenosis. L4-L5: Grade 1 anterolisthesis with uncovering of the disc. Severe bilateral  facet arthropathy with ligamentum flavum thickening. No significant spinal canal  stenosis. Mild right and no left neural foraminal stenosis. L5-S1: Diffuse disc bulge.  Severe right and mild left facet arthropathy. No  significant spinal canal stenosis. No significant neural foraminal stenosis. Impression IMPRESSION:    1. Acute L2 anterior compression fracture with mild height loss and no  retropulsion. 2. Multilevel degenerative changes as detailed above, overall not significantly  changed since 2017. 3. Unchanged grade 1 anterolisthesis of L4 on L5 and dextroscoliosis of the  lumbar spine. 23X               Diagnoses and all orders for this visit:    1. 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 Acute bilateral thoracic back pain  -     HYDROcodone-acetaminophen (NORCO) 5-325 mg per tablet; Take 1 Tab by mouth every four (4) hours as needed for Pain for up to 5 days. Max Daily Amount: 6 Tabs.  Indications: pain      [place] prescription monitoring program reviewed and no discrepancies are noted, this is reviewed with patient  5 more days hydrocodone  Suggest brace then prolia SC reviewed as good option

## 2019-09-05 NOTE — PROGRESS NOTES
1. Have you been to the ER, urgent care clinic since your last visit? Hospitalized since your last visit? No    2. Have you seen or consulted any other health care providers outside of the [address] [place] [address] since your last visit? Include any pap smears or colon screening.  No

## 2019-11-03 RX ORDER — BUPROPION HYDROCHLORIDE 150 MG/1
TABLET, EXTENDED RELEASE ORAL
Qty: 180 TAB | Refills: 1 | Status: SHIPPED | OUTPATIENT
Start: 2019-11-03 | End: 2020-05-11 | Stop reason: SDUPTHER

## 2019-11-06 DIAGNOSIS — I10 ESSENTIAL HYPERTENSION: ICD-10-CM

## 2019-11-06 RX ORDER — FELODIPINE 5 MG/1
TABLET, EXTENDED RELEASE ORAL
Qty: 90 TAB | Refills: 1 | Status: SHIPPED | OUTPATIENT
Start: 2019-11-06 | End: 2020-02-19 | Stop reason: SDUPTHER

## 2020-02-19 ENCOUNTER — OFFICE VISIT (OUTPATIENT)
Dept: DERMATOLOGY | Facility: AMBULATORY SURGERY CENTER | Age: 70
End: 2020-02-19

## 2020-02-19 ENCOUNTER — HOSPITAL ENCOUNTER (OUTPATIENT)
Dept: LAB | Age: 70
Discharge: HOME | End: 2020-02-19

## 2020-02-19 VITALS
RESPIRATION RATE: 18 BRPM | BODY MASS INDEX: 26.68 KG/M2 | OXYGEN SATURATION: 97 % | SYSTOLIC BLOOD PRESSURE: 152 MMHG | HEART RATE: 87 BPM | WEIGHT: 145 LBS | HEIGHT: 62 IN | TEMPERATURE: 98.4 F | DIASTOLIC BLOOD PRESSURE: 90 MMHG

## 2020-02-19 DIAGNOSIS — Z85.828 HISTORY OF NONMELANOMA SKIN CANCER: ICD-10-CM

## 2020-02-19 DIAGNOSIS — L72.0 EPIDERMAL INCLUSION CYST: ICD-10-CM

## 2020-02-19 DIAGNOSIS — L70.0 ACNE VULGARIS: ICD-10-CM

## 2020-02-19 DIAGNOSIS — L82.1 SEBORRHEIC KERATOSES: ICD-10-CM

## 2020-02-19 DIAGNOSIS — D48.5 NEOPLASM OF UNCERTAIN BEHAVIOR OF SKIN OF ELBOW: Primary | ICD-10-CM

## 2020-02-19 DIAGNOSIS — L81.4 LENTIGINES: ICD-10-CM

## 2020-02-19 DIAGNOSIS — D18.01 CHERRY ANGIOMA: ICD-10-CM

## 2020-02-19 DIAGNOSIS — Z12.83 SCREENING FOR MALIGNANT NEOPLASM OF SKIN: ICD-10-CM

## 2020-02-19 DIAGNOSIS — D22.9 MULTIPLE BENIGN NEVI: ICD-10-CM

## 2020-02-19 NOTE — PROGRESS NOTES
Written by Jules Lopez, as dictated by Sandra Mariano Plan, [address]. Name: Catalino Monahan       Age: 77 y.o. Date: 2/19/2020    Chief Complaint: skin exam    Subjective:    HPI  Ms. French Herman is a 77 y.o. female who presents for a full skin exam.  The patient's last skin exam was on 8/19/19 and the patient does have current complaints related to her skin. The patient reports that she has used Head and Shoulders dandruff shampoo to wash her scalp without significant improvement in bumps in her scalp. She also notes a bothersome lesion under the skin on the left elbow that she would like removed. She says this lesion has existed for 3 years. She notes bumps on her face as well - under the skin. She also notes nontender bumps in the scar of the flap from her SCC removal. She is feeling well and in her usual state of health today. She has no current illnesses, no other skin concerns. Her allergies, medications, medical, and social history are reviewed by me today. The patient's pertinent skin history includes : NMSC  -SCC, left lower leg, Mohs, 1/17/19. Closed with keystone flap    ROS: Constitutional: Negative.     Dermatological : positive for - skin lesion changes    Social History     Socioeconomic History    Marital status:      Spouse name: Not on file    Number of children: Not on file    Years of education: Not on file    Highest education level: Not on file   Occupational History    Not on file   Social Needs    Financial resource strain: Not on file    Food insecurity:     Worry: Not on file     Inability: Not on file    Transportation needs:     Medical: Not on file     Non-medical: Not on file   Tobacco Use    Smoking status: Current Every Day Smoker     Packs/day: 0.25     Years: 25.00     Pack years: 6.25     Types: Cigarettes    Smokeless tobacco: Former User     Quit date: 1/27/2014    Tobacco comment: 5 cigarettes a day    Substance and Sexual Activity    Alcohol use:  Yes     Alcohol/week: 2.5 standard drinks     Types: 3 Glasses of wine per week     Comment: occasionally    Drug use: No    Sexual activity: Not Currently   Lifestyle    Physical activity:     Days per week: Not on file     Minutes per session: Not on file    Stress: Not on file   Relationships    Social connections:     Talks on phone: Not on file     Gets together: Not on file     Attends [employer] service: Not on file     Active member of club or organization: Not on file     Attends meetings of clubs or organizations: Not on file     Relationship status: Not on file    Intimate partner violence:     Fear of current or ex partner: Not on file     Emotionally abused: Not on file     Physically abused: Not on file     Forced sexual activity: Not on file   Other Topics Concern    Not on file   Social History Narrative    Not on file       Family History   Problem Relation Age of Onset    Parkinsonism Mother     Hypertension Father     Diabetes Father     Heart Disease Father 61               Past Medical History:   Diagnosis Date    Aortic calcification ([facility] [address] 2014    Degenerative disc disease 2012    neck    Family history of skin cancer     Hepatitis C antibody test positive 2015    Hypertension     Lipoma of back 6/15/2012    Mood swings     mood swings with menopause    Osteoporosis 2010    Raynaud's disease     Status post Mohs surgery for squamous cell carcinoma in situ of skin     Sunburn, blistering     Tanning bed exposure        Past Surgical History:   Procedure Laterality Date    HX  SECTION  2709,5484,5212     x 3    HX CHOLECYSTECTOMY  1974    NEUROLOGICAL PROCEDURE UNLISTED      synovial cyst t spine       Current Outpatient Medications   Medication Sig Dispense Refill    buPROPion SR (WELLBUTRIN SR) 150 mg SR tablet TAKE 1 TABLET BY MOUTH TWICE A  Tab 1    atorvastatin (LIPITOR) 10 mg tablet Take 0.5 Tabs by mouth daily. (Patient taking differently: Take 10 mg by mouth daily.) 30 Tab 5    ibuprofen (MOTRIN) 600 mg tablet Take 1 Tab by mouth every six (6) hours as needed for Pain. Indications: Pain 100 Tab 1    felodipine (PLENDIL SR) 5 mg 24 hr tablet TAKE 1 TABLET BY MOUTH EVERY DAY 90 Tab 1    aspirin 81 mg chewable tablet Take 81 mg by mouth daily. Allergies   Allergen Reactions    Codeine Nausea and Vomiting    Fosamax [Alendronate] Nausea Only    Keflex [Cephalexin] Angioedema         Objective:    Visit Vitals  /90 (BP 1 Location: Left arm, BP Patient Position: Sitting)   Pulse 87   Temp 98.4 °F (36.9 °C) (Oral)   Resp 18   Ht 5' 2\" (1.575 m)   Wt 145 lb (65.8 kg)   SpO2 97%   BMI 26.52 kg/m²       French Naranjo is a 77 y.o. female who appears well and in no distress. She is awake, alert, and oriented. A skin examination was performed including her scalp, face (including eyelids), ears, neck, chest, back, abdomen, upper extremities (including digits/nails), lower extremities, breasts, buttocks; genital skin was not examined. She has a well healed scar on the left lower leg without evidence of lesion recurrence. There are lentigines on sun exposed areas. She has pink intradermal nevi and brown junctional nevi, no concerning features for severe atypia. She has scattered waxy macules and keratotic papules consistent with seborrheic keratoses. She has scattered red papules consistent with cherry angiomas. There is a 2 cm rubbery mobile, subcutaneous nodule on the left elbow that favors a lipoma, including the lesion of her concern.  She has a 6 x 6 mm mobile subcutaneous nodule with punctum on the left lower leg at the inferior lateral edge of her keystone flap scar and similar on the medial aspect of the keystone flap with a larger punctum/ comedone, including the lesion of her concern - favors epidermal inclusion cyst. She has inflammatory papules on the face consistent with acne. Photos from today's visit:     Left elbow      Assessment/Plan:  1. Personal history of nonmelanoma skin cancer. I discussed sun protection, sunscreen use, the warning signs of skin cancer, the need for self-skin examinations, and the need for regular practitioner exams. The patient should follow up sooner as needed if new, changing, or symptomatic skin lesions arise. 2. Solar lentigos. The diagnosis and relationship to sun exposure was reviewed. Sun protection advised. 3. Normal nevi. The diagnosis of normal nevi was reviewed. I discussed sun protection, sunscreen use, the warning signs of skin cancer, mole monitoring, the need for self-skin examinations, and the need for regular practitioner exams. The patient should follow up sooner as needed if new, changing, or symptomatic skin lesions arise. 4. Seborrheic keratoses. The diagnosis was reviewed and the patient was reassured that no treatment is needed for these benign lesions. 5. Cherry angiomas. The diagnosis was reviewed and the patient was reassured that no treatment is needed for these benign lesions. 6. Epidermal inclusion cyst. The lesion on the medial aspect was drained using manual pressure and comedone extractor. Discussed with pt that she can schedule removal for either of these surgically with Dr. Migel Gomez, as she desires. 7. Neoplasm of Uncertain Behavior, left elbow, favors cyst.  The differential diagnoses were discussed. Excision was advised for removal of this lesion on the left elbow. The procedure was reviewed and verbal and written consent were obtained. The risks of pain, bleeding, infection, recurrence of the lesion, and scar were discussed. I performed the procedure. The site was cleansed and anesthetized with 1% Lidocaine with Epinephrine 1:100,000. The lesion was excised in an elliptical manner to a depth of subcutis. Electrocoagulation was used for hemostasis.   A one layer closure was performed after the excision using 5-0 fast gut sutures for skin edge approximation. The closure length was 3 cm. The wound was bandaged and care reviewed. The specimen was sent to pathology. I will contact the patient with the results and any further treatment that may be necessary. 8. Acne. Given samples of BP to try. Next skin exam:  1 year    This plan was reviewed with the patient and patient agrees. All questions were answered. This scribe documentation was reviewed by me and accurately reflects the examination and decisions made by me. [facility]   OFFICE PROCEDURE PROGRESS NOTE   Chart reviewed for the following:   Edwin RAINES, have reviewed the History, Physical and updated the Allergic reactions for French Lozano. TIME OUT performed immediately prior to start of procedure:   Silvia RAINES, have performed the following reviews on [place] [address]   prior to the start of the procedure:     * Patient was identified by name and date of birth   * Agreement on procedure being performed was verified   * Risks and Benefits explained to the patient   * Procedure site verified and marked as necessary   * Patient was positioned for comfort   * Consent was signed and verified     Time: 10:15 am  Date of procedure: 2/19/2020  Procedure performed by: April Pinto.  Martha Young  Provider assisted by: Elvin Payton LPN   Patient assisted by: self   How tolerated by patient: tolerated the procedure well with no complications   Comments: none

## 2020-02-24 NOTE — PROGRESS NOTES
I LM on cell about benign path. I asked her to call with quesitons or concerns about the diagnosis or her healing. Otherwise f/up as scheduled.

## 2020-03-25 ENCOUNTER — TELEPHONE (OUTPATIENT)
Dept: INTERNAL MEDICINE CLINIC | Age: 70
End: 2020-03-25

## 2020-05-11 ENCOUNTER — VIRTUAL VISIT (OUTPATIENT)
Dept: INTERNAL MEDICINE CLINIC | Age: 70
End: 2020-05-11

## 2020-05-11 DIAGNOSIS — I10 ESSENTIAL HYPERTENSION: Primary | ICD-10-CM

## 2020-05-11 DIAGNOSIS — I70.0 AORTIC CALCIFICATION (HCC): ICD-10-CM

## 2020-05-11 DIAGNOSIS — E78.5 HYPERLIPIDEMIA, UNSPECIFIED HYPERLIPIDEMIA TYPE: ICD-10-CM

## 2020-05-11 DIAGNOSIS — F34.1 DYSTHYMIA: ICD-10-CM

## 2020-05-11 DIAGNOSIS — Z20.822 SUSPECTED COVID-19 VIRUS INFECTION: ICD-10-CM

## 2020-05-11 PROBLEM — M48.50XA VERTEBRAL COMPRESSION FRACTURE (HCC): Status: ACTIVE | Noted: 2020-05-11

## 2020-05-11 PROBLEM — S62.304A FRACTURE OF FOURTH METACARPAL BONE OF RIGHT HAND: Status: ACTIVE | Noted: 2020-05-11

## 2020-05-11 PROBLEM — R07.81 RIB PAIN ON RIGHT SIDE: Status: ACTIVE | Noted: 2020-05-11

## 2020-05-11 PROBLEM — M54.6 THORACIC BACK PAIN: Status: ACTIVE | Noted: 2020-05-11

## 2020-05-11 PROBLEM — S22.49XA MULTIPLE RIB FRACTURES: Status: ACTIVE | Noted: 2020-05-11

## 2020-05-11 PROBLEM — S80.00XA KNEE CONTUSION: Status: ACTIVE | Noted: 2020-05-11
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
## 2020-05-11 RX ORDER — FELODIPINE 5 MG/1
TABLET, EXTENDED RELEASE ORAL
Qty: 90 TAB | Refills: 1 | Status: SHIPPED | OUTPATIENT
Start: 2020-05-11 | End: 2020-11-19 | Stop reason: SDUPTHER

## 2020-05-11 RX ORDER — ALBUTEROL SULFATE 90 UG/1
AEROSOL, METERED RESPIRATORY (INHALATION)
COMMUNITY
Start: 2020-03-15 | End: 2020-11-19 | Stop reason: ALTCHOICE

## 2020-05-11 RX ORDER — BUPROPION HYDROCHLORIDE 150 MG/1
TABLET, EXTENDED RELEASE ORAL
Qty: 180 TAB | Refills: 1 | Status: SHIPPED | OUTPATIENT
Start: 2020-05-11 | End: 2020-12-31 | Stop reason: SDUPTHER

## 2020-05-11 RX ORDER — ATORVASTATIN CALCIUM 10 MG/1
10 TABLET, FILM COATED ORAL DAILY
Qty: 90 TAB | Refills: 3 | Status: SHIPPED | OUTPATIENT
Start: 2020-05-11 | End: 2020-10-28

## 2020-05-11 NOTE — PROGRESS NOTES
French Tapia is a 77 y.o. female with the following Problems and Medications. ,  THIS IS A VIRTUAL VISIT USING [employer] SOFTWARE    Patient Active Problem List   Diagnosis Code    Hypertension I10    Raynaud's disease I73.00    Depression F32.9    Osteoporosis M81.0    HLD (hyperlipidemia) E78.5    Degenerative disc disease [id number]    Lipoma of back D17.1    Aortic calcification (HCC) I70.0    Hepatitis C antibody test positive R76.8    Tobacco abuse Z72.0    Fall from ground level W18.30XA    Knee contusion S80.00XA    Multiple rib fractures S22.49XA    Fracture of fourth metacarpal bone of right hand S62.304A    Rib pain on right side R07.81    Thoracic back pain M54.6    Vertebral compression fracture (HCC) M48.50XA     Current Outpatient Medications   Medication Sig Dispense Refill    Ventolin HFA 90 mcg/actuation inhaler TAKE 2 PUFF(S) EVERY 6 HOURS AS NEEDED      buPROPion SR (WELLBUTRIN SR) 150 mg SR tablet TAKE 1 TABLET BY MOUTH TWICE A  Tab 1    felodipine (PLENDIL SR) 5 mg 24 hr tablet TAKE 1 TABLET BY MOUTH EVERY DAY 90 Tab 1    atorvastatin (LIPITOR) 10 mg tablet Take 1 Tab by mouth daily. 90 Tab 3    ibuprofen (MOTRIN) 600 mg tablet Take 1 Tab by mouth every six (6) hours as needed for Pain. Indications: Pain 100 Tab 1    aspirin 81 mg chewable tablet Take 81 mg by mouth daily. This is a 43-year-old white female who is here for follow-up, virtual visit, on hypertension, dyslipidemia, depression. She has a history of recurrent bronchitis, had a very bad illness in 03/2020. She was seen at urgent care, not my office. She wonders if she had COVID-19. She has never been tested. 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 She is staying home and isolating for the last 5 or 6 weeks and feels fine. She quit smoking in 01/2020 and has had no respiratory symptoms. She has a history of untreated osteoporosis.   She has hypertension, dyslipidemia, and has been feeling okay. She has been eating fairly well, exercising a little, walking some. Mood and depression have been stable. She is on Wellbutrin. She originally was started on that to help her quit smoking, wanted to continue it because of the mood elevating effects. She denies any pain. She is known to have aortic calcification. Denies chest pain with exertion. Denies shortness of breath. Denies PND, orthopnea, or claudication. Neurologically, no TIA or neurological symptoms. Her blood pressure: She is not getting home blood pressure readings. She says she has ordered on [address] in the last 2 days, a blood pressure cuff and a scale and so she can start monitoring that. She is due for labs in 08/2020. She requests refills on meds and feels okay otherwise. Cardiovascular ROS: no chest pain or dyspnea on exertion  Neurological ROS: no TIA or stroke symptoms  General ROS: negative for - chills, fatigue, fever, malaise, night sweats or sleep disturbance  Endocrine ROS: negative for - hair pattern changes, hot flashes, malaise/lethargy, palpitations, polydipsia/polyuria, temperature intolerance or unexpected weight changes        No vitals    No exam    appears healthy    1. Hyperlipidemia, unspecified hyperlipidemia type  Will need labs  - atorvastatin (LIPITOR) 10 mg tablet; Take 1 Tab by mouth daily. Dispense: 90 Tab; Refill: 3    2. Aortic calcification (HCC)  From [facility]    3. Essential hypertension  Control ok we hope    BP Readings from Last 3 Encounters:   02/19/20 152/90   09/05/19 135/75   09/03/19 139/89         4.  Dysthymia  Stable      Requested Prescriptions     Signed Prescriptions Disp Refills    buPROPion SR (WELLBUTRIN SR) 150 mg SR tablet 180 Tab 1     Sig: TAKE 1 TABLET BY MOUTH TWICE A DAY    felodipine (PLENDIL SR) 5 mg 24 hr tablet 90 Tab 1     Sig: TAKE 1 TABLET BY MOUTH EVERY DAY    atorvastatin (LIPITOR) 10 mg tablet 90 Tab 3     Sig: Take 1 Tab by mouth daily.      Prolonged visit with 15 minutes of time out  of more than a  25 minute visit spent counseling patient and family and formulation of care  Bronchitis in march ? covid  resolved  Reviewed antibody testing

## 2020-10-28 DIAGNOSIS — E78.5 HYPERLIPIDEMIA, UNSPECIFIED HYPERLIPIDEMIA TYPE: ICD-10-CM

## 2020-10-28 RX ORDER — ATORVASTATIN CALCIUM 10 MG/1
TABLET, FILM COATED ORAL
Qty: 45 TAB | Refills: 3 | Status: SHIPPED | OUTPATIENT
Start: 2020-10-28 | End: 2021-04-15

## 2020-11-19 ENCOUNTER — OFFICE VISIT (OUTPATIENT)
Dept: INTERNAL MEDICINE CLINIC | Age: 70
End: 2020-11-19
Payer: MEDICARE

## 2020-11-19 VITALS
RESPIRATION RATE: 20 BRPM | OXYGEN SATURATION: 98 % | DIASTOLIC BLOOD PRESSURE: 80 MMHG | HEIGHT: 62 IN | BODY MASS INDEX: 29.19 KG/M2 | SYSTOLIC BLOOD PRESSURE: 129 MMHG | TEMPERATURE: 95.3 F | HEART RATE: 124 BPM | WEIGHT: 158.6 LBS

## 2020-11-19 DIAGNOSIS — M54.41 ACUTE BACK PAIN WITH SCIATICA, RIGHT: Primary | ICD-10-CM

## 2020-11-19 PROCEDURE — G8754 DIAS BP LESS 90: HCPCS | Performed by: INTERNAL MEDICINE

## 2020-11-19 PROCEDURE — G8536 NO DOC ELDER MAL SCRN: HCPCS | Performed by: INTERNAL MEDICINE

## 2020-11-19 PROCEDURE — 1101F PT FALLS ASSESS-DOCD LE1/YR: CPT | Performed by: INTERNAL MEDICINE

## 2020-11-19 PROCEDURE — 99213 OFFICE O/P EST LOW 20 MIN: CPT | Performed by: INTERNAL MEDICINE

## 2020-11-19 PROCEDURE — G9717 DOC PT DX DEP/BP F/U NT REQ: HCPCS | Performed by: INTERNAL MEDICINE

## 2020-11-19 PROCEDURE — 3017F COLORECTAL CA SCREEN DOC REV: CPT | Performed by: INTERNAL MEDICINE

## 2020-11-19 PROCEDURE — G8752 SYS BP LESS 140: HCPCS | Performed by: INTERNAL MEDICINE

## 2020-11-19 PROCEDURE — G8419 CALC BMI OUT NRM PARAM NOF/U: HCPCS | Performed by: INTERNAL MEDICINE

## 2020-11-19 PROCEDURE — 1090F PRES/ABSN URINE INCON ASSESS: CPT | Performed by: INTERNAL MEDICINE

## 2020-11-19 PROCEDURE — G8427 DOCREV CUR MEDS BY ELIG CLIN: HCPCS | Performed by: INTERNAL MEDICINE

## 2020-11-19 RX ORDER — FELODIPINE 5 MG/1
TABLET, EXTENDED RELEASE ORAL
Qty: 90 TAB | Refills: 1 | Status: SHIPPED | OUTPATIENT
Start: 2020-11-19 | End: 2021-04-07 | Stop reason: ALTCHOICE

## 2020-11-19 RX ORDER — HYDROCODONE BITARTRATE AND ACETAMINOPHEN 5; 325 MG/1; MG/1
1 TABLET ORAL
Qty: 12 TAB | Refills: 0 | Status: SHIPPED | OUTPATIENT
Start: 2020-11-19 | End: 2020-11-22

## 2020-11-19 RX ORDER — METHYLPREDNISOLONE 4 MG/1
TABLET ORAL
Qty: 1 DOSE PACK | Refills: 0 | Status: SHIPPED | OUTPATIENT
Start: 2020-11-19 | End: 2020-11-24 | Stop reason: ALTCHOICE

## 2020-11-19 NOTE — PROGRESS NOTES
Chief Complaint   Patient presents with    Back Pain     lower back pain, onset two days, denies any injury       1. Have you been to the ER, urgent care clinic since your last visit? Hospitalized since your last visit? No    2. Have you seen or consulted any other health care providers outside of the [address] [place] [address] since your last visit? Include any pap smears or colon screening.  No

## 2020-11-19 NOTE — PROGRESS NOTES
SUBJECTIVE:   French Durán is a 77 y.o. female who complains of low back pain for 2 to 3 day(s), positional with bending or lifting, with radiation down the legs. Precipitating factors: none recalled by the patient. Prior history of back problems: recurrent self limited episodes of low back pain in the past. There is no numbness in the legs. Right thigh pain    OBJECTIVE:  Visit Vitals  /80   Pulse (!) 124   Temp (!) 95.3 °F (35.2 °C) (Tympanic)   Resp 20   Ht 5' 2\" (1.575 m)   Wt 158 lb 9.6 oz (71.9 kg)   SpO2 98%   BMI 29.01 kg/m²      Patient appears to be in mild to moderate pain, antalgic gait noted. Lumbosacral spine area reveals no local tenderness or mass. Painful and reduced LS ROM noted. Straight leg raise is positive at 45 degrees on right. DTR's, motor strength and sensation normal, including heel and toe gait. Peripheral pulses are palpable. X-Ray: not indicated. 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 Discussed longer term treatment plan of prn NSAID's and discussed a home back care exercise program with flexion exercise routine. Proper lifting with avoidance of heavy lifting discussed. Consider Physical Therapy and XRay studies if not improving. Call or return to clinic prn if these symptoms worsen or fail to improve as anticipated. Diagnoses and all orders for this visit:    1. Acute back pain with sciatica, right  -     HYDROcodone-acetaminophen (NORCO) 5-325 mg per tablet; Take 1 Tab by mouth every six (6) hours as needed for Pain for up to 3 days. Max Daily Amount: 4 Tabs. Other orders  -     felodipine (PLENDIL SR) 5 mg 24 hr tablet; TAKE 1 TABLET BY MOUTH EVERY DAY  -     methylPREDNISolone (MEDROL DOSEPACK) 4 mg tablet;  As directed      Refill bp meds

## 2020-11-19 NOTE — LETTER
11/19/2020 11:57 AM 
 
Ms. French VALENZUELA Kin [address] [place] [address] [facility] [id number] Patient will return to work on Monday November 23,2020 with no restrictions. Any questions or concerns,please call our office at [phone]. Sincerely, Donnel Ormond, MD

## 2020-11-21 ENCOUNTER — PATIENT MESSAGE (OUTPATIENT)
Dept: INTERNAL MEDICINE CLINIC | Age: 70
End: 2020-11-21

## 2020-11-23 ENCOUNTER — PATIENT MESSAGE (OUTPATIENT)
Dept: INTERNAL MEDICINE CLINIC | Age: 70
End: 2020-11-23

## 2020-11-23 DIAGNOSIS — M54.41 ACUTE RIGHT-SIDED LOW BACK PAIN WITH RIGHT-SIDED SCIATICA: Primary | ICD-10-CM

## 2020-11-23 NOTE — TELEPHONE ENCOUNTER
From: Balaji Stafford  To: Ledy Bravo MD  Sent: 11/23/2020 3:24 PM EST  Subject: Prescription Question    Dr Jeferson Fam, I tried to go to work today and lasted till noon. I am still in a great deal of pain and prescription IBUPROFEN and the steroids are not even touching it. When I came home from work I pretty much collapsed on the bed for a few hours. I did have chills as well. When I woke up I was still cold and in pain. We have a web appointment at [facility]. Do you think I need an X-ray? And could you call in a small prescription for pain. Thanks for you time.    French

## 2020-11-24 ENCOUNTER — VIRTUAL VISIT (OUTPATIENT)
Dept: INTERNAL MEDICINE CLINIC | Age: 70
End: 2020-11-24
Payer: MEDICARE

## 2020-11-24 DIAGNOSIS — M54.41 ACUTE RIGHT-SIDED LOW BACK PAIN WITH RIGHT-SIDED SCIATICA: ICD-10-CM

## 2020-11-24 DIAGNOSIS — Z12.11 COLON CANCER SCREENING: Primary | ICD-10-CM

## 2020-11-24 DIAGNOSIS — Z00.00 MEDICARE ANNUAL WELLNESS VISIT, SUBSEQUENT: ICD-10-CM

## 2020-11-24 PROCEDURE — G8419 CALC BMI OUT NRM PARAM NOF/U: HCPCS | Performed by: INTERNAL MEDICINE

## 2020-11-24 PROCEDURE — 3017F COLORECTAL CA SCREEN DOC REV: CPT | Performed by: INTERNAL MEDICINE

## 2020-11-24 PROCEDURE — 1090F PRES/ABSN URINE INCON ASSESS: CPT | Performed by: INTERNAL MEDICINE

## 2020-11-24 PROCEDURE — 99213 OFFICE O/P EST LOW 20 MIN: CPT | Performed by: INTERNAL MEDICINE

## 2020-11-24 PROCEDURE — G0463 HOSPITAL OUTPT CLINIC VISIT: HCPCS | Performed by: INTERNAL MEDICINE

## 2020-11-24 PROCEDURE — G9717 DOC PT DX DEP/BP F/U NT REQ: HCPCS | Performed by: INTERNAL MEDICINE

## 2020-11-24 PROCEDURE — G0439 PPPS, SUBSEQ VISIT: HCPCS | Performed by: INTERNAL MEDICINE

## 2020-11-24 PROCEDURE — G8756 NO BP MEASURE DOC: HCPCS | Performed by: INTERNAL MEDICINE

## 2020-11-24 PROCEDURE — G8536 NO DOC ELDER MAL SCRN: HCPCS | Performed by: INTERNAL MEDICINE

## 2020-11-24 PROCEDURE — G8428 CUR MEDS NOT DOCUMENT: HCPCS | Performed by: INTERNAL MEDICINE

## 2020-11-24 PROCEDURE — 1101F PT FALLS ASSESS-DOCD LE1/YR: CPT | Performed by: INTERNAL MEDICINE

## 2020-11-24 RX ORDER — IBUPROFEN 800 MG/1
TABLET ORAL
COMMUNITY
Start: 2020-11-10

## 2020-11-24 NOTE — PATIENT INSTRUCTIONS
Medicare Wellness Visit, Female The best way to live healthy is to have a lifestyle where you eat a well-balanced diet, exercise regularly, limit alcohol use, and quit all forms of tobacco/nicotine, if applicable. Regular preventive services are another way to keep healthy. Preventive services (vaccines, screening tests, monitoring & exams) can help personalize your care plan, which helps you manage your own care. Screening tests can find health problems at the earliest stages, when they are easiest to treat. [facility] follows the current, evidence-based guidelines published by the [facility] ([facility]) when recommending preventive services for our patients. Because we follow these guidelines, sometimes recommendations change over time as research supports it. (For example, mammograms used to be recommended annually. Even though Medicare will still pay for an annual mammogram, the newer guidelines recommend a mammogram every two years for women of average risk). Of course, you and your doctor may decide to screen more often for some diseases, based on your risk and your co-morbidities (chronic disease you are already diagnosed with). Preventive services for you include: - Medicare offers their members a free annual wellness visit, which is time for you and your primary care provider to discuss and plan for your preventive service needs. Take advantage of this benefit every year! 
-All adults over the age of 72 should receive the recommended pneumonia vaccines. Current USPSTF guidelines recommend a series of two vaccines for the best pneumonia protection.  
-All adults should have a flu vaccine yearly and a tetanus vaccine every 10 years.  
-All adults age 48 and older should receive the shingles vaccines (series of two vaccines). -All adults age 38-68 who are overweight should have a diabetes screening test once every three years. -All adults born between [birth year] and 1965 should be screened once for Hepatitis C. 
-Other screening tests and preventive services for persons with diabetes include: an eye exam to screen for diabetic retinopathy, a kidney function test, a foot exam, and stricter control over your cholesterol.  
-Cardiovascular screening for adults with routine risk involves an electrocardiogram (ECG) at intervals determined by your doctor.  
-Colorectal cancer screenings should be done for adults age 54-65 with no increased risk factors for colorectal cancer. There are a number of acceptable methods of screening for this type of cancer. Each test has its own benefits and drawbacks. Discuss with your doctor what is most appropriate for you during your annual wellness visit. The different tests include: colonoscopy (considered the best screening method), a fecal occult blood test, a fecal DNA test, and sigmoidoscopy. 
 
-A bone mass density test is recommended when a woman turns 65 to screen for osteoporosis. This test is only recommended one time, as a screening. Some providers will use this same test as a disease monitoring tool if you already have osteoporosis. -Breast cancer screenings are recommended every other year for women of normal risk, age 54-69. 
-Cervical cancer screenings for women over age 72 are only recommended with certain risk factors. Here is a list of your current Health Maintenance items (your personalized list of preventive services) with a due date: 
Health Maintenance Due Topic Date Due  
 DTaP/Tdap/Td  (1 - Tdap) 12/10/1972  Shingles Vaccine (1 of 2) 12/10/2001  Colorectal Screening  12/10/2001  Mammogram  12/10/2001  Glaucoma Screening   12/10/2016  Pneumococcal Vaccine (2 of 2 - PPSV23) 11/01/2018  Cholesterol Test   08/19/2020 [address] [place] [address] Annual Well Visit  08/22/2020

## 2020-11-24 NOTE — PROGRESS NOTES
Follow up to previous visit, patient went back to work and is now home,Patient states she was in a lot of pain yesterday and will need a note. . Discuss pneumonia vaccine.

## 2020-12-11 ENCOUNTER — TELEPHONE (OUTPATIENT)
Dept: INTERNAL MEDICINE CLINIC | Age: 70
End: 2020-12-11

## 2020-12-11 NOTE — TELEPHONE ENCOUNTER
Covid Positive       Callback required yes/no and why: Yes/Confirm       Best contact number(s):[phone]       Details to clarify the request: Patient has tested positive for Covid and would like Dr. Kristy Romero advice on what to do next.      [facility]

## 2020-12-14 PROBLEM — Z86.16 HISTORY OF 2019 NOVEL CORONAVIRUS DISEASE (COVID-19): Status: ACTIVE | Noted: 2020-12-14

## 2020-12-14 RX ORDER — FELODIPINE 5 MG/1
TABLET, EXTENDED RELEASE ORAL
Qty: 90 TAB | Refills: 1 | Status: SHIPPED | OUTPATIENT
Start: 2020-12-14 | End: 2021-04-07 | Stop reason: DRUGHIGH

## 2020-12-31 RX ORDER — BUPROPION HYDROCHLORIDE 150 MG/1
TABLET, EXTENDED RELEASE ORAL
Qty: 180 TAB | Refills: 1 | Status: SHIPPED | OUTPATIENT
Start: 2020-12-31 | End: 2021-07-01

## 2021-04-07 ENCOUNTER — OFFICE VISIT (OUTPATIENT)
Dept: INTERNAL MEDICINE CLINIC | Age: 71
End: 2021-04-07
Payer: MEDICARE

## 2021-04-07 VITALS
WEIGHT: 145 LBS | OXYGEN SATURATION: 96 % | HEART RATE: 96 BPM | DIASTOLIC BLOOD PRESSURE: 98 MMHG | BODY MASS INDEX: 26.68 KG/M2 | RESPIRATION RATE: 16 BRPM | SYSTOLIC BLOOD PRESSURE: 155 MMHG | TEMPERATURE: 98.2 F | HEIGHT: 62 IN

## 2021-04-07 DIAGNOSIS — I10 ESSENTIAL HYPERTENSION: ICD-10-CM

## 2021-04-07 DIAGNOSIS — R30.0 PAINFUL URGING TO URINATE: Primary | ICD-10-CM

## 2021-04-07 DIAGNOSIS — M81.0 OSTEOPOROSIS, UNSPECIFIED OSTEOPOROSIS TYPE, UNSPECIFIED PATHOLOGICAL FRACTURE PRESENCE: ICD-10-CM

## 2021-04-07 LAB
BILIRUB UR QL STRIP: NEGATIVE
GLUCOSE UR-MCNC: NEGATIVE MG/DL
KETONES P FAST UR STRIP-MCNC: NORMAL MG/DL
PH UR STRIP: 6 [PH] (ref 4.6–8)
PROT UR QL STRIP: NORMAL
SP GR UR STRIP: 1.03 (ref 1–1.03)
UA UROBILINOGEN AMB POC: NORMAL (ref 0.2–1)
URINALYSIS CLARITY POC: NORMAL
URINALYSIS COLOR POC: YELLOW
URINE BLOOD POC: NORMAL
URINE LEUKOCYTES POC: NORMAL
URINE NITRITES POC: POSITIVE

## 2021-04-07 PROCEDURE — G8419 CALC BMI OUT NRM PARAM NOF/U: HCPCS | Performed by: INTERNAL MEDICINE

## 2021-04-07 PROCEDURE — 1090F PRES/ABSN URINE INCON ASSESS: CPT | Performed by: INTERNAL MEDICINE

## 2021-04-07 PROCEDURE — G0463 HOSPITAL OUTPT CLINIC VISIT: HCPCS | Performed by: INTERNAL MEDICINE

## 2021-04-07 PROCEDURE — G8427 DOCREV CUR MEDS BY ELIG CLIN: HCPCS | Performed by: INTERNAL MEDICINE

## 2021-04-07 PROCEDURE — G9717 DOC PT DX DEP/BP F/U NT REQ: HCPCS | Performed by: INTERNAL MEDICINE

## 2021-04-07 PROCEDURE — G8536 NO DOC ELDER MAL SCRN: HCPCS | Performed by: INTERNAL MEDICINE

## 2021-04-07 PROCEDURE — G8755 DIAS BP > OR = 90: HCPCS | Performed by: INTERNAL MEDICINE

## 2021-04-07 PROCEDURE — 1101F PT FALLS ASSESS-DOCD LE1/YR: CPT | Performed by: INTERNAL MEDICINE

## 2021-04-07 PROCEDURE — 3017F COLORECTAL CA SCREEN DOC REV: CPT | Performed by: INTERNAL MEDICINE

## 2021-04-07 PROCEDURE — 81000 URINALYSIS NONAUTO W/SCOPE: CPT | Performed by: INTERNAL MEDICINE

## 2021-04-07 PROCEDURE — G8753 SYS BP > OR = 140: HCPCS | Performed by: INTERNAL MEDICINE

## 2021-04-07 PROCEDURE — 99213 OFFICE O/P EST LOW 20 MIN: CPT | Performed by: INTERNAL MEDICINE

## 2021-04-07 RX ORDER — FELODIPINE 10 MG/1
10 TABLET, EXTENDED RELEASE ORAL DAILY
Qty: 90 TAB | Refills: 1 | Status: SHIPPED | OUTPATIENT
Start: 2021-04-07 | End: 2021-07-18

## 2021-04-07 RX ORDER — NITROFURANTOIN 25; 75 MG/1; MG/1
100 CAPSULE ORAL 2 TIMES DAILY
Qty: 14 CAP | Refills: 0 | Status: SHIPPED | OUTPATIENT
Start: 2021-04-07 | End: 2021-04-14

## 2021-04-07 NOTE — PROGRESS NOTES
1. Have you been to the ER, urgent care clinic since your last visit? Hospitalized since your last visit? No    2. Have you seen or consulted any other health care providers outside of the [address] [place] [address] since your last visit? Include any pap smears or colon screening.  No   Chief Complaint   Patient presents with    Urinary Pain

## 2021-04-07 NOTE — PROGRESS NOTES
SUBJECTIVE: French Kraus is a 72 y.o. female who complains of urinary frequency, urgency and dysuria x 4 days, without flank pain, fever, chills, or abnormal vaginal discharge or bleeding. OBJECTIVE: Appears well, in no apparent distress. Vital signs are normal. The abdomen is soft without tenderness, guarding, mass, rebound or organomegaly. No CVA tenderness or inguinal adenopathy noted. Urine dipstick shows positive for WBC's, positive for RBC's and positive for nitrates. Micro exam: not done. The abdomen is soft without tenderness, guarding, mass, rebound or organomegaly. Bowel sounds are normal. No CVA tenderness or inguinal adenopathy noted. ASSESSMENT: UTI uncomplicated without evidence of pyelonephritis    PLAN: Treatment per orders - also push fluids, may use Pyridium OTC prn. Call or return to clinic prn if these symptoms worsen or fail to improve as anticipated. French Kraus is a 72 y.o. female with the following Problems and Medications. Patient Active Problem List   Diagnosis Code    Hypertension I10    Raynaud's disease I73.00    Depression F32.9    Osteoporosis M81.0    HLD (hyperlipidemia) E78.5    Degenerative disc disease MST0792    Lipoma of back D17.1    Aortic calcification (HCC) I70.0    Hepatitis C antibody test positive R76.8    Tobacco abuse Z72.0    Fall from ground level W18.30XA    Knee contusion S80.00XA    Multiple rib fractures S22.49XA    Fracture of fourth metacarpal bone of right hand S62.304A    Rib pain on right side R07.81    Thoracic back pain M54.6    Vertebral compression fracture (HCC) M48.50XA    History of 2019 novel coronavirus disease (COVID-19) Z86.16     Current Outpatient Medications   Medication Sig Dispense Refill    nitrofurantoin, macrocrystal-monohydrate, (MACROBID) 100 mg capsule Take 1 Cap by mouth two (2) times a day for 7 days.  14 Cap 0    denosumab (PROLIA) 60 mg/mL injection 1 mL by SubCUTAneous route every 6 months. Indications: osteoporosis in postmenopausal woman at high risk for fracture 1 mL 1    felodipine (PLENDIL SR) 10 mg 24 hr tablet Take 1 Tab by mouth daily. TAKE 1 TABLET BY MOUTH EVERY DAY 90 Tab 1    buPROPion SR (WELLBUTRIN SR) 150 mg SR tablet TAKE 1 TABLET BY MOUTH TWICE A  Tab 1    atorvastatin (LIPITOR) 10 mg tablet TAKE 1/2 TABLET BY MOUTH EVERY DAY 45 Tab 3    aspirin 81 mg chewable tablet Take 81 mg by mouth daily.  ibuprofen (MOTRIN) 800 mg tablet TAKE 1 TABLET BY MOUTH EVERY 6 HOURS AS NEEDED FOR PAIN       She has hypertension, she has dyslipidemia, she has a history of aortic calcification. She has osteoporosis, was intolerant of Fosamax and has been off any medication for osteoporosis for more than a year. She had COVID in December-January and recovered. She works at a memory care unit, works full-time. Generally feeling okay. She has got chronic Raynaud's. She has not had any falls or had any fractures, but she has had some previous fractures. Her BP is 145 and a repeat of 155/. Vitals:    04/07/21 1407   BP: (!) 145/98   Pulse: 96   Resp: 16   Temp: 98.2 °F (36.8 °C)   TempSrc: Temporal   SpO2: 96%   Weight: 145 lb (65.8 kg)   Height: 5' 2\" (1.575 m)       S1 and S2 normal, no murmurs, clicks, gallops or rubs. Regular rate and rhythm. Chest is clear; no wheezes or rales. No edema or JVD. Gait normal    Diagnoses and all orders for this visit:    1. Painful urging to urinate  -     AMB POC URINALYSIS DIP STICK MANUAL W/ MICRO    2. Osteoporosis, unspecified osteoporosis type, unspecified pathological fracture presence  -     CBC WITH AUTOMATED DIFF; Future  -     LIPID PANEL; Future  -     METABOLIC PANEL, COMPREHENSIVE; Future  -     MAGNESIUM; Future  -     PHOSPHORUS; Future    3. Essential hypertension  -     CBC WITH AUTOMATED DIFF; Future  -     LIPID PANEL; Future  -     METABOLIC PANEL, COMPREHENSIVE; Future  -     MAGNESIUM;  Future  - PHOSPHORUS; Future    Other orders  -     nitrofurantoin, macrocrystal-monohydrate, (MACROBID) 100 mg capsule; Take 1 Cap by mouth two (2) times a day for 7 days. -     denosumab (PROLIA) 60 mg/mL injection; 1 mL by SubCUTAneous route every 6 months. Indications: osteoporosis in postmenopausal woman at high risk for fracture  -     felodipine (PLENDIL SR) 10 mg 24 hr tablet; Take 1 Tab by mouth daily. TAKE 1 TABLET BY MOUTH EVERY DAY      Due to untreated osteoporosis will start prolia every 6 months      1. Painful urging to urinate  uti treat macrobid  - AMB POC URINALYSIS DIP STICK MANUAL W/ MICRO  - nitrofurantoin, macrocrystal-monohydrate, (MACROBID) 100 mg capsule; Take 1 Cap by mouth two (2) times a day for 7 days. Dispense: 14 Cap; Refill: 0  - CULTURE, URINE; Future    2. Osteoporosis, unspecified osteoporosis type, unspecified pathological fracture presence  Discussed in detailk  - denosumab (PROLIA) 60 mg/mL injection; 1 mL by SubCUTAneous route every 6 months. Indications: osteoporosis in postmenopausal woman at high risk for fracture  Dispense: 1 mL; Refill: 1  - CBC WITH AUTOMATED DIFF; Future  - LIPID PANEL; Future  - METABOLIC PANEL, COMPREHENSIVE; Future  - MAGNESIUM; Future  - PHOSPHORUS; Future    3. Essential hypertension  Not controlled increase dose plendil to 10 mg  - CBC WITH AUTOMATED DIFF; Future  - LIPID PANEL; Future  - METABOLIC PANEL, COMPREHENSIVE; Future  - MAGNESIUM;  Future  - PHOSPHORUS; Future

## 2021-04-10 LAB
BACTERIA SPEC CULT: ABNORMAL
CC UR VC: ABNORMAL
SERVICE CMNT-IMP: ABNORMAL

## 2021-04-12 DIAGNOSIS — M81.0 OSTEOPOROSIS, UNSPECIFIED OSTEOPOROSIS TYPE, UNSPECIFIED PATHOLOGICAL FRACTURE PRESENCE: Primary | ICD-10-CM

## 2021-04-12 RX ORDER — DIPHENHYDRAMINE HYDROCHLORIDE 50 MG/ML
50 INJECTION, SOLUTION INTRAMUSCULAR; INTRAVENOUS AS NEEDED
Status: CANCELLED
Start: 2021-04-15

## 2021-04-12 RX ORDER — ALBUTEROL SULFATE 0.83 MG/ML
2.5 SOLUTION RESPIRATORY (INHALATION) AS NEEDED
Status: CANCELLED
Start: 2021-04-15

## 2021-04-12 RX ORDER — HYDROCORTISONE SODIUM SUCCINATE 100 MG/2ML
100 INJECTION, POWDER, FOR SOLUTION INTRAMUSCULAR; INTRAVENOUS AS NEEDED
Status: CANCELLED | OUTPATIENT
Start: 2021-04-15

## 2021-04-12 RX ORDER — DIPHENHYDRAMINE HYDROCHLORIDE 50 MG/ML
25 INJECTION, SOLUTION INTRAMUSCULAR; INTRAVENOUS AS NEEDED
Status: CANCELLED
Start: 2021-04-15

## 2021-04-12 RX ORDER — ACETAMINOPHEN 325 MG/1
650 TABLET ORAL AS NEEDED
Status: CANCELLED
Start: 2021-04-15

## 2021-04-12 RX ORDER — EPINEPHRINE 1 MG/ML
0.3 INJECTION, SOLUTION, CONCENTRATE INTRAVENOUS AS NEEDED
Status: CANCELLED | OUTPATIENT
Start: 2021-04-15

## 2021-04-12 RX ORDER — ONDANSETRON 2 MG/ML
8 INJECTION INTRAMUSCULAR; INTRAVENOUS AS NEEDED
Status: CANCELLED | OUTPATIENT
Start: 2021-04-15

## 2021-04-15 ENCOUNTER — HOSPITAL ENCOUNTER (OUTPATIENT)
Dept: INFUSION THERAPY | Age: 71
Discharge: HOME | End: 2021-04-15
Payer: MEDICARE

## 2021-04-15 VITALS
DIASTOLIC BLOOD PRESSURE: 92 MMHG | BODY MASS INDEX: 26.35 KG/M2 | SYSTOLIC BLOOD PRESSURE: 136 MMHG | OXYGEN SATURATION: 95 % | TEMPERATURE: 96.7 F | HEIGHT: 62 IN | WEIGHT: 143.2 LBS | HEART RATE: 83 BPM | RESPIRATION RATE: 18 BRPM

## 2021-04-15 DIAGNOSIS — M81.0 OSTEOPOROSIS, UNSPECIFIED OSTEOPOROSIS TYPE, UNSPECIFIED PATHOLOGICAL FRACTURE PRESENCE: ICD-10-CM

## 2021-04-15 DIAGNOSIS — E78.5 HYPERLIPIDEMIA, UNSPECIFIED HYPERLIPIDEMIA TYPE: ICD-10-CM

## 2021-04-15 DIAGNOSIS — M81.0 OSTEOPOROSIS, UNSPECIFIED OSTEOPOROSIS TYPE, UNSPECIFIED PATHOLOGICAL FRACTURE PRESENCE: Primary | ICD-10-CM

## 2021-04-15 LAB
ALBUMIN SERPL-MCNC: 4.1 G/DL (ref 3.5–5)
ALBUMIN/GLOB SERPL: 1.1 {RATIO} (ref 1.1–2.2)
ALP SERPL-CCNC: 136 U/L (ref 45–117)
ALT SERPL-CCNC: 27 U/L (ref 12–78)
ANION GAP SERPL CALC-SCNC: 4 MMOL/L (ref 5–15)
AST SERPL-CCNC: 22 U/L (ref 15–37)
BASOPHILS # BLD: 0.1 K/UL (ref 0–0.1)
BASOPHILS NFR BLD: 1 % (ref 0–1)
BILIRUB SERPL-MCNC: 0.5 MG/DL (ref 0.2–1)
BUN SERPL-MCNC: 9 MG/DL (ref 6–20)
BUN/CREAT SERPL: 12 (ref 12–20)
CALCIUM SERPL-MCNC: 8.9 MG/DL (ref 8.5–10.1)
CHLORIDE SERPL-SCNC: 107 MMOL/L (ref 97–108)
CHOLEST SERPL-MCNC: 212 MG/DL
CO2 SERPL-SCNC: 26 MMOL/L (ref 21–32)
CREAT SERPL-MCNC: 0.73 MG/DL (ref 0.55–1.02)
DIFFERENTIAL METHOD BLD: ABNORMAL
EOSINOPHIL # BLD: 0.2 K/UL (ref 0–0.4)
EOSINOPHIL NFR BLD: 3 % (ref 0–7)
ERYTHROCYTE [DISTWIDTH] IN BLOOD BY AUTOMATED COUNT: 13 % (ref 11.5–14.5)
GLOBULIN SER CALC-MCNC: 3.7 G/DL (ref 2–4)
GLUCOSE SERPL-MCNC: 84 MG/DL (ref 65–100)
HCT VFR BLD AUTO: 43.1 % (ref 35–47)
HDLC SERPL-MCNC: 70 MG/DL
HDLC SERPL: 3 {RATIO} (ref 0–5)
HGB BLD-MCNC: 13.8 G/DL (ref 11.5–16)
IMM GRANULOCYTES # BLD AUTO: 0 K/UL (ref 0–0.04)
IMM GRANULOCYTES NFR BLD AUTO: 0 % (ref 0–0.5)
LDLC SERPL CALC-MCNC: 112.6 MG/DL (ref 0–100)
LIPID PROFILE,FLP: ABNORMAL
LYMPHOCYTES # BLD: 2.1 K/UL (ref 0.8–3.5)
LYMPHOCYTES NFR BLD: 28 % (ref 12–49)
MAGNESIUM SERPL-MCNC: 2.3 MG/DL (ref 1.6–2.4)
MCH RBC QN AUTO: 29.7 PG (ref 26–34)
MCHC RBC AUTO-ENTMCNC: 32 G/DL (ref 30–36.5)
MCV RBC AUTO: 92.7 FL (ref 80–99)
MONOCYTES # BLD: 0.8 K/UL (ref 0–1)
MONOCYTES NFR BLD: 10 % (ref 5–13)
NEUTS SEG # BLD: 4.2 K/UL (ref 1.8–8)
NEUTS SEG NFR BLD: 58 % (ref 32–75)
NRBC # BLD: 0 K/UL (ref 0–0.01)
NRBC BLD-RTO: 0 PER 100 WBC
PHOSPHATE SERPL-MCNC: 2.8 MG/DL (ref 2.6–4.7)
PLATELET # BLD AUTO: 417 K/UL (ref 150–400)
PMV BLD AUTO: 9.2 FL (ref 8.9–12.9)
POTASSIUM SERPL-SCNC: 3.9 MMOL/L (ref 3.5–5.1)
PROT SERPL-MCNC: 7.8 G/DL (ref 6.4–8.2)
RBC # BLD AUTO: 4.65 M/UL (ref 3.8–5.2)
SODIUM SERPL-SCNC: 137 MMOL/L (ref 136–145)
TRIGL SERPL-MCNC: 147 MG/DL (ref ?–150)
VLDLC SERPL CALC-MCNC: 29.4 MG/DL
WBC # BLD AUTO: 7.3 K/UL (ref 3.6–11)

## 2021-04-15 PROCEDURE — 80061 LIPID PANEL: CPT

## 2021-04-15 PROCEDURE — 83735 ASSAY OF MAGNESIUM: CPT

## 2021-04-15 PROCEDURE — 84100 ASSAY OF PHOSPHORUS: CPT

## 2021-04-15 PROCEDURE — 74011250636 HC RX REV CODE- 250/636: Performed by: INTERNAL MEDICINE

## 2021-04-15 PROCEDURE — 80053 COMPREHEN METABOLIC PANEL: CPT

## 2021-04-15 PROCEDURE — 96372 THER/PROPH/DIAG INJ SC/IM: CPT

## 2021-04-15 PROCEDURE — 85025 COMPLETE CBC W/AUTO DIFF WBC: CPT

## 2021-04-15 PROCEDURE — 36415 COLL VENOUS BLD VENIPUNCTURE: CPT

## 2021-04-15 RX ORDER — ACETAMINOPHEN 325 MG/1
650 TABLET ORAL AS NEEDED
Status: CANCELLED
Start: 2021-10-12

## 2021-04-15 RX ORDER — DIPHENHYDRAMINE HYDROCHLORIDE 50 MG/ML
50 INJECTION, SOLUTION INTRAMUSCULAR; INTRAVENOUS AS NEEDED
Status: CANCELLED
Start: 2021-10-12

## 2021-04-15 RX ORDER — DIPHENHYDRAMINE HYDROCHLORIDE 50 MG/ML
25 INJECTION, SOLUTION INTRAMUSCULAR; INTRAVENOUS AS NEEDED
Status: CANCELLED
Start: 2021-10-12

## 2021-04-15 RX ORDER — ATORVASTATIN CALCIUM 20 MG/1
TABLET, FILM COATED ORAL
Qty: 90 TAB | Refills: 1 | Status: SHIPPED | OUTPATIENT
Start: 2021-04-15 | End: 2021-08-26

## 2021-04-15 RX ORDER — HYDROCORTISONE SODIUM SUCCINATE 100 MG/2ML
100 INJECTION, POWDER, FOR SOLUTION INTRAMUSCULAR; INTRAVENOUS AS NEEDED
Status: CANCELLED | OUTPATIENT
Start: 2021-10-12

## 2021-04-15 RX ORDER — EPINEPHRINE 1 MG/ML
0.3 INJECTION, SOLUTION, CONCENTRATE INTRAVENOUS AS NEEDED
Status: CANCELLED | OUTPATIENT
Start: 2021-10-12

## 2021-04-15 RX ORDER — ALBUTEROL SULFATE 0.83 MG/ML
2.5 SOLUTION RESPIRATORY (INHALATION) AS NEEDED
Status: CANCELLED
Start: 2021-10-12

## 2021-04-15 RX ORDER — ONDANSETRON 2 MG/ML
8 INJECTION INTRAMUSCULAR; INTRAVENOUS AS NEEDED
Status: CANCELLED | OUTPATIENT
Start: 2021-10-12

## 2021-04-15 RX ADMIN — DENOSUMAB 60 MG: 60 INJECTION SUBCUTANEOUS at 16:47

## 2021-04-15 NOTE — PROGRESS NOTES
[facility] Progress Note    Date: April 15, 2021    Name: Zak Blanchard    MRN: [id number]         : 1950    Ms. Sanjiv Barba Arrived ambulatory and in no distress for Prolia Injection (DUSTY). Assessment was completed, no acute issues at this time, no new complaints voiced. Covid questionnaire completed. 1. Do you have any symptoms of COVID-19? SOB, coughing, fever, or generally not feeling well - no    2. Have you been exposed to COVID-19 recently? - no    3. Have you had any recent contact with family/friend that has a pending COVID test? - no      Ms. Shani Garcia vitals were reviewed. Visit Vitals  BP (!) 136/92 (BP 1 Location: Left arm, BP Patient Position: Sitting)   Pulse 83   Temp (!) 96.7 °F (35.9 °C)   Resp 18   Ht 5' 2\" (1.575 m)   Wt 65 kg (143 lb 3.2 oz)   SpO2 95%   Breastfeeding No   BMI 26.19 kg/m²     Lab results reviewed and obtained. Recent Results (from the past 8 hour(s))   MAGNESIUM    Collection Time: 04/15/21  3:44 PM   Result Value Ref Range    Magnesium 2.3 1.6 - 2.4 mg/dL   PHOSPHORUS    Collection Time: 04/15/21  3:44 PM   Result Value Ref Range    Phosphorus 2.8 2.6 - 4.7 MG/DL   CBC WITH AUTOMATED DIFF    Collection Time: 04/15/21  3:44 PM   Result Value Ref Range    WBC 7.3 3.6 - 11.0 K/uL    RBC 4.65 3.80 - 5.20 M/uL    HGB 13.8 11.5 - 16.0 g/dL    HCT 43.1 35.0 - 47.0 %    MCV 92.7 80.0 - 99.0 FL    MCH 29.7 26.0 - 34.0 PG    MCHC 32.0 30.0 - 36.5 g/dL    RDW 13.0 11.5 - 14.5 %    PLATELET 574 (H) 838 - 400 K/uL    MPV 9.2 8.9 - 12.9 FL    NRBC 0.0 0  WBC    ABSOLUTE NRBC 0.00 0.00 - 0.01 K/uL    NEUTROPHILS 58 32 - 75 %    LYMPHOCYTES 28 12 - 49 %    MONOCYTES 10 5 - 13 %    EOSINOPHILS 3 0 - 7 %    BASOPHILS 1 0 - 1 %    IMMATURE GRANULOCYTES 0 0.0 - 0.5 %    ABS. NEUTROPHILS 4.2 1.8 - 8.0 K/UL    ABS. LYMPHOCYTES 2.1 0.8 - 3.5 K/UL    ABS. MONOCYTES 0.8 0.0 - 1.0 K/UL    ABS. EOSINOPHILS 0.2 0.0 - 0.4 K/UL    ABS.  BASOPHILS 0.1 0.0 - 0.1 K/UL ABS. IMM. GRANS. 0.0 0.00 - 0.04 K/UL    DF AUTOMATED     METABOLIC PANEL, COMPREHENSIVE    Collection Time: 04/15/21  3:44 PM   Result Value Ref Range    Sodium 137 136 - 145 mmol/L    Potassium 3.9 3.5 - 5.1 mmol/L    Chloride 107 97 - 108 mmol/L    CO2 26 21 - 32 mmol/L    Anion gap 4 (L) 5 - 15 mmol/L    Glucose 84 65 - 100 mg/dL    BUN 9 6 - 20 MG/DL    Creatinine 0.73 0.55 - 1.02 MG/DL    BUN/Creatinine ratio 12 12 - 20      GFR est AA >60 >60 ml/min/1.73m2    GFR est non-AA >60 >60 ml/min/1.73m2    Calcium 8.9 8.5 - 10.1 MG/DL    Bilirubin, total 0.5 0.2 - 1.0 MG/DL    ALT (SGPT) 27 12 - 78 U/L    AST (SGOT) 22 15 - 37 U/L    Alk. phosphatase 136 (H) 45 - 117 U/L    Protein, total 7.8 6.4 - 8.2 g/dL    Albumin 4.1 3.5 - 5.0 g/dL    Globulin 3.7 2.0 - 4.0 g/dL    A-G Ratio 1.1 1.1 - 2.2       Medications:  Medications Administered     denosumab (PROLIA) injection 60 mg     Admin Date  04/15/2021 Action  Given Dose  60 mg Route  SubCUTAneous Administered By  Santana Reynolds RN              Medication information provided on prolia. Ms. Danae Agarwal tolerated treatment well and was discharged from [place] [address] in stable condition. She is to return on October 14 2021 at  for her next appointment.     Marium Blanc RN  April 15, 2021

## 2021-06-03 ENCOUNTER — OFFICE VISIT (OUTPATIENT)
Dept: INTERNAL MEDICINE CLINIC | Age: 71
End: 2021-06-03
Payer: MEDICARE

## 2021-06-03 VITALS
RESPIRATION RATE: 20 BRPM | OXYGEN SATURATION: 98 % | SYSTOLIC BLOOD PRESSURE: 136 MMHG | TEMPERATURE: 98.6 F | HEIGHT: 62 IN | BODY MASS INDEX: 26.13 KG/M2 | WEIGHT: 142 LBS | HEART RATE: 87 BPM | DIASTOLIC BLOOD PRESSURE: 85 MMHG

## 2021-06-03 DIAGNOSIS — R82.90 URINE ABNORMALITY: Primary | ICD-10-CM

## 2021-06-03 DIAGNOSIS — N39.0 FREQUENT UTI: ICD-10-CM

## 2021-06-03 LAB
BILIRUB UR QL STRIP: NEGATIVE
GLUCOSE UR-MCNC: NEGATIVE MG/DL
KETONES P FAST UR STRIP-MCNC: NEGATIVE MG/DL
PH UR STRIP: 7.5 [PH] (ref 4.6–8)
PROT UR QL STRIP: NORMAL
SP GR UR STRIP: 1.02 (ref 1–1.03)
UA UROBILINOGEN AMB POC: NORMAL (ref 0.2–1)
URINALYSIS CLARITY POC: NORMAL
URINALYSIS COLOR POC: YELLOW
URINE BLOOD POC: NORMAL
URINE LEUKOCYTES POC: NORMAL
URINE NITRITES POC: NEGATIVE

## 2021-06-03 PROCEDURE — G8419 CALC BMI OUT NRM PARAM NOF/U: HCPCS | Performed by: PHYSICIAN ASSISTANT

## 2021-06-03 PROCEDURE — 1090F PRES/ABSN URINE INCON ASSESS: CPT | Performed by: PHYSICIAN ASSISTANT

## 2021-06-03 PROCEDURE — 81002 URINALYSIS NONAUTO W/O SCOPE: CPT | Performed by: PHYSICIAN ASSISTANT

## 2021-06-03 PROCEDURE — 99213 OFFICE O/P EST LOW 20 MIN: CPT | Performed by: PHYSICIAN ASSISTANT

## 2021-06-03 PROCEDURE — G8427 DOCREV CUR MEDS BY ELIG CLIN: HCPCS | Performed by: PHYSICIAN ASSISTANT

## 2021-06-03 PROCEDURE — G8754 DIAS BP LESS 90: HCPCS | Performed by: PHYSICIAN ASSISTANT

## 2021-06-03 PROCEDURE — 3017F COLORECTAL CA SCREEN DOC REV: CPT | Performed by: PHYSICIAN ASSISTANT

## 2021-06-03 PROCEDURE — G9717 DOC PT DX DEP/BP F/U NT REQ: HCPCS | Performed by: PHYSICIAN ASSISTANT

## 2021-06-03 PROCEDURE — G0463 HOSPITAL OUTPT CLINIC VISIT: HCPCS | Performed by: PHYSICIAN ASSISTANT

## 2021-06-03 PROCEDURE — G8752 SYS BP LESS 140: HCPCS | Performed by: PHYSICIAN ASSISTANT

## 2021-06-03 PROCEDURE — 1101F PT FALLS ASSESS-DOCD LE1/YR: CPT | Performed by: PHYSICIAN ASSISTANT

## 2021-06-03 PROCEDURE — G8536 NO DOC ELDER MAL SCRN: HCPCS | Performed by: PHYSICIAN ASSISTANT

## 2021-06-03 RX ORDER — NITROFURANTOIN 25; 75 MG/1; MG/1
100 CAPSULE ORAL 2 TIMES DAILY
Qty: 14 CAPSULE | Refills: 0 | Status: SHIPPED | OUTPATIENT
Start: 2021-06-03 | End: 2021-06-21 | Stop reason: ALTCHOICE

## 2021-06-04 NOTE — PROGRESS NOTES
Chief Complaint   Patient presents with    Urinary Burning     she is a 72y.o. year old female who presents for evaluation. Patient presents the office today for evaluation of possible urinary tract infection. She reports that she has been having a tingling-like sensation when she goes to the bathroom. She reports she is also has some frequency. The patient reports that she had a urinary tract infection about a couple months ago. She reports that she is concerned about the frequency any urinary tract infection. Reviewed PmHx, RxHx, FmHx, SocHx, AllgHx and updated and dated in the chart. Review of Systems - negative except as listed above    Objective:     Vitals:    06/03/21 1516   BP: 136/85   Pulse: 87   Resp: 20   Temp: 98.6 °F (37 °C)   TempSrc: Temporal   SpO2: 98%   Weight: 142 lb (64.4 kg)   Height: 5' 2\" (1.575 m)     Physical Examination: General appearance - alert, well appearing, and in no distress  Mental status - normal mood, behavior, speech, dress, motor activity, and thought processes  Abdomen - soft, nontender, nondistended, no masses or organomegaly    Assessment/ Plan:   Diagnoses and all orders for this visit:    1. Urine abnormality  -     CULTURE, URINE; Future  -     AMB POC URINALYSIS DIP STICK MANUAL W/O MICRO  -     nitrofurantoin, macrocrystal-monohydrate, (MACROBID) 100 mg capsule; Take 1 Capsule by mouth two (2) times a day. 2. Frequent UTI  -     REFERRAL TO UROLOGY    Patient has been given Macrobid to take for urinary tract infection. She will be contact with results of the urine culture once it is back. I have also given the patient a referral to the urologist.  She may contact her for an appointment to follow-up on the frequent UTIs. I have discussed the diagnosis with the patient and the intended plan as seen in the above orders. The patient has received an after-visit summary and questions were answered concerning future plans.      Medication Side Effects and Warnings were discussed with patient: yes  Patient Labs were reviewed and or requested: yes  Patient Past Records were reviewed and or requested  yes    Desire Keller PA-C

## 2021-06-05 LAB
BACTERIA SPEC CULT: NORMAL
SERVICE CMNT-IMP: NORMAL

## 2021-06-21 ENCOUNTER — OFFICE VISIT (OUTPATIENT)
Dept: INTERNAL MEDICINE CLINIC | Age: 71
End: 2021-06-21
Payer: MEDICARE

## 2021-06-21 VITALS
BODY MASS INDEX: 25.76 KG/M2 | WEIGHT: 140 LBS | RESPIRATION RATE: 16 BRPM | HEART RATE: 90 BPM | HEIGHT: 62 IN | DIASTOLIC BLOOD PRESSURE: 87 MMHG | TEMPERATURE: 98.3 F | SYSTOLIC BLOOD PRESSURE: 125 MMHG

## 2021-06-21 DIAGNOSIS — R10.32 GROIN PAIN, LEFT: Primary | ICD-10-CM

## 2021-06-21 PROCEDURE — 99213 OFFICE O/P EST LOW 20 MIN: CPT | Performed by: INTERNAL MEDICINE

## 2021-06-21 PROCEDURE — G8427 DOCREV CUR MEDS BY ELIG CLIN: HCPCS | Performed by: INTERNAL MEDICINE

## 2021-06-21 PROCEDURE — G0463 HOSPITAL OUTPT CLINIC VISIT: HCPCS | Performed by: INTERNAL MEDICINE

## 2021-06-21 PROCEDURE — G8754 DIAS BP LESS 90: HCPCS | Performed by: INTERNAL MEDICINE

## 2021-06-21 PROCEDURE — 1101F PT FALLS ASSESS-DOCD LE1/YR: CPT | Performed by: INTERNAL MEDICINE

## 2021-06-21 PROCEDURE — 1090F PRES/ABSN URINE INCON ASSESS: CPT | Performed by: INTERNAL MEDICINE

## 2021-06-21 PROCEDURE — G9717 DOC PT DX DEP/BP F/U NT REQ: HCPCS | Performed by: INTERNAL MEDICINE

## 2021-06-21 PROCEDURE — 3017F COLORECTAL CA SCREEN DOC REV: CPT | Performed by: INTERNAL MEDICINE

## 2021-06-21 PROCEDURE — G8752 SYS BP LESS 140: HCPCS | Performed by: INTERNAL MEDICINE

## 2021-06-21 PROCEDURE — G8419 CALC BMI OUT NRM PARAM NOF/U: HCPCS | Performed by: INTERNAL MEDICINE

## 2021-06-21 PROCEDURE — G8536 NO DOC ELDER MAL SCRN: HCPCS | Performed by: INTERNAL MEDICINE

## 2021-06-21 NOTE — PROGRESS NOTES
Subjective:      French Rosales is an 72 y.o. female who presents for evaluation of abdominal pain. The pain is described as aching, and is 2/10 in intensity. Pain is located in the LLQ, left groin without radiation. Onset was 1 day ago. Symptoms have been gradually improving since. Aggravating factors: movement. Alleviating factors: none. Associated symptoms: nonme  . The patient denies belching, chills, constipation, diarrhea, dysuria, fever, flatus, headache, hematuria, joint pain, melena and nausea. Lifted couches at work yesterday  Rested today      The abdomen is soft without tenderness, guarding, mass, rebound or organomegaly. Bowel sounds are normal. No CVA tenderness or inguinal adenopathy noted. rom left hip normal  No hernia  Vitals:    06/21/21 1650   BP: 125/87   Pulse: 90   Resp: 16   Temp: 98.3 °F (36.8 °C)   TempSrc: Temporal   Weight: 140 lb (63.5 kg)   Height: 5' 2\" (1.575 m)     Wt Readings from Last 3 Encounters:   06/21/21 140 lb (63.5 kg)   06/03/21 142 lb (64.4 kg)   04/15/21 143 lb 3.2 oz (65 kg)       \1.  Groin pain, left  Muscular better with rest

## 2021-07-01 RX ORDER — BUPROPION HYDROCHLORIDE 150 MG/1
TABLET, EXTENDED RELEASE ORAL
Qty: 180 TABLET | Refills: 1 | Status: SHIPPED | OUTPATIENT
Start: 2021-07-01 | End: 2022-01-11

## 2021-07-18 RX ORDER — FELODIPINE 10 MG/1
TABLET, EXTENDED RELEASE ORAL
Qty: 90 TABLET | Refills: 1 | Status: SHIPPED | OUTPATIENT
Start: 2021-07-18 | End: 2022-01-11

## 2021-08-25 DIAGNOSIS — E78.5 HYPERLIPIDEMIA, UNSPECIFIED HYPERLIPIDEMIA TYPE: ICD-10-CM

## 2021-08-26 RX ORDER — ATORVASTATIN CALCIUM 20 MG/1
TABLET, FILM COATED ORAL
Qty: 90 TABLET | Refills: 1 | Status: SHIPPED | OUTPATIENT
Start: 2021-08-26 | End: 2022-03-25

## 2021-10-10 DIAGNOSIS — M81.0 OSTEOPOROSIS, UNSPECIFIED OSTEOPOROSIS TYPE, UNSPECIFIED PATHOLOGICAL FRACTURE PRESENCE: ICD-10-CM

## 2021-10-14 ENCOUNTER — HOSPITAL ENCOUNTER (OUTPATIENT)
Dept: INFUSION THERAPY | Age: 71
Discharge: HOME | End: 2021-10-14
Payer: MEDICARE

## 2021-10-14 VITALS
HEIGHT: 62 IN | BODY MASS INDEX: 26.65 KG/M2 | HEART RATE: 84 BPM | RESPIRATION RATE: 18 BRPM | SYSTOLIC BLOOD PRESSURE: 147 MMHG | TEMPERATURE: 96.6 F | OXYGEN SATURATION: 97 % | WEIGHT: 144.8 LBS | DIASTOLIC BLOOD PRESSURE: 84 MMHG

## 2021-10-14 DIAGNOSIS — M81.0 OSTEOPOROSIS, UNSPECIFIED OSTEOPOROSIS TYPE, UNSPECIFIED PATHOLOGICAL FRACTURE PRESENCE: Primary | ICD-10-CM

## 2021-10-14 LAB
ALBUMIN SERPL-MCNC: 3.8 G/DL (ref 3.5–5)
ALBUMIN/GLOB SERPL: 1.2 {RATIO} (ref 1.1–2.2)
ALP SERPL-CCNC: 79 U/L (ref 45–117)
ALT SERPL-CCNC: 25 U/L (ref 12–78)
ANION GAP BLD CALC-SCNC: 13 MMOL/L (ref 10–20)
ANION GAP SERPL CALC-SCNC: 4 MMOL/L (ref 5–15)
AST SERPL-CCNC: 18 U/L (ref 15–37)
BILIRUB SERPL-MCNC: 0.5 MG/DL (ref 0.2–1)
BUN SERPL-MCNC: 14 MG/DL (ref 6–20)
BUN/CREAT SERPL: 17 (ref 12–20)
CA-I BLD-MCNC: 1.1 MMOL/L (ref 1.12–1.32)
CALCIUM SERPL-MCNC: 8.6 MG/DL (ref 8.5–10.1)
CHLORIDE BLD-SCNC: 105 MMOL/L (ref 98–107)
CHLORIDE SERPL-SCNC: 109 MMOL/L (ref 97–108)
CO2 BLD-SCNC: 24.6 MMOL/L (ref 21–32)
CO2 SERPL-SCNC: 26 MMOL/L (ref 21–32)
CREAT BLD-MCNC: 0.81 MG/DL (ref 0.6–1.3)
CREAT SERPL-MCNC: 0.83 MG/DL (ref 0.55–1.02)
GLOBULIN SER CALC-MCNC: 3.3 G/DL (ref 2–4)
GLUCOSE BLD-MCNC: 81 MG/DL (ref 65–100)
GLUCOSE SERPL-MCNC: 80 MG/DL (ref 65–100)
MAGNESIUM SERPL-MCNC: 2.2 MG/DL (ref 1.6–2.4)
PHOSPHATE SERPL-MCNC: 2.7 MG/DL (ref 2.6–4.7)
POTASSIUM BLD-SCNC: 3.8 MMOL/L (ref 3.5–5.1)
POTASSIUM SERPL-SCNC: 3.7 MMOL/L (ref 3.5–5.1)
PROT SERPL-MCNC: 7.1 G/DL (ref 6.4–8.2)
SERVICE CMNT-IMP: ABNORMAL
SODIUM BLD-SCNC: 142 MMOL/L (ref 136–145)
SODIUM SERPL-SCNC: 139 MMOL/L (ref 136–145)

## 2021-10-14 PROCEDURE — 74011250636 HC RX REV CODE- 250/636: Performed by: INTERNAL MEDICINE

## 2021-10-14 PROCEDURE — 80053 COMPREHEN METABOLIC PANEL: CPT

## 2021-10-14 PROCEDURE — 84100 ASSAY OF PHOSPHORUS: CPT

## 2021-10-14 PROCEDURE — 36415 COLL VENOUS BLD VENIPUNCTURE: CPT

## 2021-10-14 PROCEDURE — 83735 ASSAY OF MAGNESIUM: CPT

## 2021-10-14 PROCEDURE — 80047 BASIC METABLC PNL IONIZED CA: CPT

## 2021-10-14 PROCEDURE — 96372 THER/PROPH/DIAG INJ SC/IM: CPT

## 2021-10-14 RX ORDER — ACETAMINOPHEN 325 MG/1
650 TABLET ORAL AS NEEDED
Status: CANCELLED
Start: 2022-04-10

## 2021-10-14 RX ORDER — DIPHENHYDRAMINE HYDROCHLORIDE 50 MG/ML
50 INJECTION, SOLUTION INTRAMUSCULAR; INTRAVENOUS AS NEEDED
Status: CANCELLED
Start: 2022-04-10

## 2021-10-14 RX ORDER — DIPHENHYDRAMINE HYDROCHLORIDE 50 MG/ML
25 INJECTION, SOLUTION INTRAMUSCULAR; INTRAVENOUS AS NEEDED
Status: CANCELLED
Start: 2022-04-10

## 2021-10-14 RX ORDER — EPINEPHRINE 1 MG/ML
0.3 INJECTION, SOLUTION, CONCENTRATE INTRAVENOUS AS NEEDED
Status: CANCELLED | OUTPATIENT
Start: 2022-04-10

## 2021-10-14 RX ORDER — HYDROCORTISONE SODIUM SUCCINATE 100 MG/2ML
100 INJECTION, POWDER, FOR SOLUTION INTRAMUSCULAR; INTRAVENOUS AS NEEDED
Status: CANCELLED | OUTPATIENT
Start: 2022-04-10

## 2021-10-14 RX ORDER — ONDANSETRON 2 MG/ML
8 INJECTION INTRAMUSCULAR; INTRAVENOUS AS NEEDED
Status: CANCELLED | OUTPATIENT
Start: 2022-04-10

## 2021-10-14 RX ORDER — ALBUTEROL SULFATE 0.83 MG/ML
2.5 SOLUTION RESPIRATORY (INHALATION) AS NEEDED
Status: CANCELLED
Start: 2022-04-10

## 2021-10-14 RX ADMIN — DENOSUMAB 60 MG: 60 INJECTION SUBCUTANEOUS at 16:28

## 2021-10-14 NOTE — PROGRESS NOTES
[facility] Progress Note    Date: 2021    Name: Sergey Ho    MRN: [id number]         : 1950    Ms. Shawnee Loredo Arrived ambulatory and in no distress for Prolia Injection. Assessment was completed, no acute issues at this time, no new complaints voiced. Ms. Rocio Schroeder vitals were reviewed. Visit Vitals  BP (!) 147/84   Pulse 84   Temp (!) 96.6 °F (35.9 °C)   Resp 18   Ht 5' 2\" (1.575 m)   Wt 65.7 kg (144 lb 12.8 oz)   SpO2 97%   BMI 26.48 kg/m²     Labs drawn and within parameters for treatment. Recent Results (from the past 12 hour(s))   MAGNESIUM    Collection Time: 10/14/21  3:37 PM   Result Value Ref Range    Magnesium 2.2 1.6 - 2.4 mg/dL   PHOSPHORUS    Collection Time: 10/14/21  3:37 PM   Result Value Ref Range    Phosphorus 2.7 2.6 - 4.7 MG/DL   METABOLIC PANEL, COMPREHENSIVE    Collection Time: 10/14/21  3:37 PM   Result Value Ref Range    Sodium 139 136 - 145 mmol/L    Potassium 3.7 3.5 - 5.1 mmol/L    Chloride 109 (H) 97 - 108 mmol/L    CO2 26 21 - 32 mmol/L    Anion gap 4 (L) 5 - 15 mmol/L    Glucose 80 65 - 100 mg/dL    BUN 14 6 - 20 MG/DL    Creatinine 0.83 0.55 - 1.02 MG/DL    BUN/Creatinine ratio 17 12 - 20      GFR est AA >60 >60 ml/min/1.73m2    GFR est non-AA >60 >60 ml/min/1.73m2    Calcium 8.6 8.5 - 10.1 MG/DL    Bilirubin, total 0.5 0.2 - 1.0 MG/DL    ALT (SGPT) 25 12 - 78 U/L    AST (SGOT) 18 15 - 37 U/L    Alk.  phosphatase 79 45 - 117 U/L    Protein, total 7.1 6.4 - 8.2 g/dL    Albumin 3.8 3.5 - 5.0 g/dL    Globulin 3.3 2.0 - 4.0 g/dL    A-G Ratio 1.2 1.1 - 2.2     POC CHEM8    Collection Time: 10/14/21  3:40 PM   Result Value Ref Range    Calcium, ionized (POC) 1.10 (L) 1.12 - 1.32 mmol/L    Sodium (POC) 142 136 - 145 mmol/L    Potassium (POC) 3.8 3.5 - 5.1 mmol/L    Chloride (POC) 105 98 - 107 mmol/L    CO2 (POC) 24.6 21 - 32 mmol/L    Anion gap (POC) 13 10 - 20 mmol/L    Glucose (POC) 81 65 - 100 mg/dL    Creatinine (POC) 0.81 0.6 - 1.3 mg/dL GFRAA, POC >60 >60 ml/min/1.73m2    GFRNA, POC >60 >60 ml/min/1.73m2    Comment Comment Not Indicated. Medications:  Medications Administered     denosumab (PROLIA) injection 60 mg     Admin Date  10/14/2021 Action  Given Dose  60 mg Route  SubCUTAneous Administered By  [place], [place]                  Ms. Pastor Mohr tolerated treatment well and was discharged from [place] [address] in stable condition. She is to return on April 14 at  for her next appointment.     [facility]  October 14, 2021

## 2022-01-11 RX ORDER — FELODIPINE 10 MG/1
TABLET, EXTENDED RELEASE ORAL
Qty: 90 TABLET | Refills: 1 | Status: SHIPPED | OUTPATIENT
Start: 2022-01-11 | End: 2022-07-10

## 2022-01-11 RX ORDER — BUPROPION HYDROCHLORIDE 150 MG/1
TABLET, EXTENDED RELEASE ORAL
Qty: 180 TABLET | Refills: 1 | Status: SHIPPED | OUTPATIENT
Start: 2022-01-11 | End: 2022-07-10

## 2022-03-18 PROBLEM — M54.6 THORACIC BACK PAIN: Status: ACTIVE | Noted: 2020-05-11

## 2022-03-18 PROBLEM — M48.50XA VERTEBRAL COMPRESSION FRACTURE (HCC): Status: ACTIVE | Noted: 2020-05-11
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
## 2022-03-19 PROBLEM — S62.304A FRACTURE OF FOURTH METACARPAL BONE OF RIGHT HAND: Status: ACTIVE | Noted: 2020-05-11

## 2022-03-19 PROBLEM — R07.81 RIB PAIN ON RIGHT SIDE: Status: ACTIVE | Noted: 2020-05-11

## 2022-03-19 PROBLEM — Z86.16 HISTORY OF 2019 NOVEL CORONAVIRUS DISEASE (COVID-19): Status: ACTIVE | Noted: 2020-12-14

## 2022-03-19 PROBLEM — S22.49XA MULTIPLE RIB FRACTURES: Status: ACTIVE | Noted: 2020-05-11

## 2022-03-19 PROBLEM — Z72.0 TOBACCO ABUSE: Status: ACTIVE | Noted: 2017-04-13

## 2022-03-19 PROBLEM — S80.00XA KNEE CONTUSION: Status: ACTIVE | Noted: 2020-05-11

## 2022-03-25 DIAGNOSIS — E78.5 HYPERLIPIDEMIA, UNSPECIFIED HYPERLIPIDEMIA TYPE: ICD-10-CM

## 2022-03-25 RX ORDER — ATORVASTATIN CALCIUM 20 MG/1
TABLET, FILM COATED ORAL
Qty: 90 TABLET | Refills: 1 | Status: SHIPPED | OUTPATIENT
Start: 2022-03-25

## 2022-04-01 DIAGNOSIS — M81.0 OSTEOPOROSIS, UNSPECIFIED OSTEOPOROSIS TYPE, UNSPECIFIED PATHOLOGICAL FRACTURE PRESENCE: Primary | ICD-10-CM

## 2022-04-01 RX ORDER — HYDROCORTISONE SODIUM SUCCINATE 100 MG/2ML
100 INJECTION, POWDER, FOR SOLUTION INTRAMUSCULAR; INTRAVENOUS AS NEEDED
Status: CANCELLED | OUTPATIENT
Start: 2022-04-10

## 2022-04-01 RX ORDER — EPINEPHRINE 1 MG/ML
0.3 INJECTION, SOLUTION, CONCENTRATE INTRAVENOUS AS NEEDED
Status: CANCELLED | OUTPATIENT
Start: 2022-04-10

## 2022-04-01 RX ORDER — DIPHENHYDRAMINE HYDROCHLORIDE 50 MG/ML
25 INJECTION, SOLUTION INTRAMUSCULAR; INTRAVENOUS AS NEEDED
Status: CANCELLED
Start: 2022-04-10

## 2022-04-01 RX ORDER — ACETAMINOPHEN 325 MG/1
650 TABLET ORAL AS NEEDED
Status: CANCELLED
Start: 2022-04-10

## 2022-04-01 RX ORDER — ONDANSETRON 2 MG/ML
8 INJECTION INTRAMUSCULAR; INTRAVENOUS AS NEEDED
Status: CANCELLED | OUTPATIENT
Start: 2022-04-10

## 2022-04-01 RX ORDER — ALBUTEROL SULFATE 0.83 MG/ML
2.5 SOLUTION RESPIRATORY (INHALATION) AS NEEDED
Status: CANCELLED
Start: 2022-04-10

## 2022-04-01 RX ORDER — DIPHENHYDRAMINE HYDROCHLORIDE 50 MG/ML
50 INJECTION, SOLUTION INTRAMUSCULAR; INTRAVENOUS AS NEEDED
Status: CANCELLED
Start: 2022-04-10

## 2022-04-14 ENCOUNTER — HOSPITAL ENCOUNTER (OUTPATIENT)
Dept: INFUSION THERAPY | Age: 72
Discharge: HOME | End: 2022-04-14
Payer: MEDICARE

## 2022-04-14 VITALS
WEIGHT: 149 LBS | SYSTOLIC BLOOD PRESSURE: 137 MMHG | TEMPERATURE: 98.8 F | HEART RATE: 79 BPM | BODY MASS INDEX: 27.42 KG/M2 | HEIGHT: 62 IN | RESPIRATION RATE: 18 BRPM | OXYGEN SATURATION: 95 % | DIASTOLIC BLOOD PRESSURE: 75 MMHG

## 2022-04-14 DIAGNOSIS — M81.0 OSTEOPOROSIS, UNSPECIFIED OSTEOPOROSIS TYPE, UNSPECIFIED PATHOLOGICAL FRACTURE PRESENCE: Primary | ICD-10-CM

## 2022-04-14 LAB
ANION GAP BLD CALC-SCNC: 9 MMOL/L (ref 10–20)
CA-I BLD-MCNC: 1.14 MMOL/L (ref 1.12–1.32)
CHLORIDE BLD-SCNC: 107 MMOL/L (ref 98–107)
CO2 BLD-SCNC: 27.6 MMOL/L (ref 21–32)
CREAT BLD-MCNC: 0.68 MG/DL (ref 0.6–1.3)
GLUCOSE BLD-MCNC: 77 MG/DL (ref 65–100)
MAGNESIUM SERPL-MCNC: 2.2 MG/DL (ref 1.6–2.4)
PHOSPHATE SERPL-MCNC: 3.4 MG/DL (ref 2.6–4.7)
POTASSIUM BLD-SCNC: 4 MMOL/L (ref 3.5–5.1)
SERVICE CMNT-IMP: ABNORMAL
SODIUM BLD-SCNC: 143 MMOL/L (ref 136–145)

## 2022-04-14 PROCEDURE — 74011250636 HC RX REV CODE- 250/636: Performed by: INTERNAL MEDICINE

## 2022-04-14 PROCEDURE — 84100 ASSAY OF PHOSPHORUS: CPT

## 2022-04-14 PROCEDURE — 80047 BASIC METABLC PNL IONIZED CA: CPT

## 2022-04-14 PROCEDURE — 96372 THER/PROPH/DIAG INJ SC/IM: CPT

## 2022-04-14 PROCEDURE — 83735 ASSAY OF MAGNESIUM: CPT

## 2022-04-14 PROCEDURE — 36415 COLL VENOUS BLD VENIPUNCTURE: CPT

## 2022-04-14 RX ORDER — DIPHENHYDRAMINE HYDROCHLORIDE 50 MG/ML
50 INJECTION, SOLUTION INTRAMUSCULAR; INTRAVENOUS AS NEEDED
Status: CANCELLED
Start: 2022-10-13

## 2022-04-14 RX ORDER — ACETAMINOPHEN 325 MG/1
650 TABLET ORAL AS NEEDED
Status: CANCELLED
Start: 2022-10-13

## 2022-04-14 RX ORDER — ALBUTEROL SULFATE 0.83 MG/ML
2.5 SOLUTION RESPIRATORY (INHALATION) AS NEEDED
Status: CANCELLED
Start: 2022-10-13

## 2022-04-14 RX ORDER — EPINEPHRINE 1 MG/ML
0.3 INJECTION, SOLUTION, CONCENTRATE INTRAVENOUS AS NEEDED
Status: CANCELLED | OUTPATIENT
Start: 2022-10-13

## 2022-04-14 RX ORDER — ONDANSETRON 2 MG/ML
8 INJECTION INTRAMUSCULAR; INTRAVENOUS AS NEEDED
Status: CANCELLED | OUTPATIENT
Start: 2022-10-13

## 2022-04-14 RX ORDER — DIPHENHYDRAMINE HYDROCHLORIDE 50 MG/ML
25 INJECTION, SOLUTION INTRAMUSCULAR; INTRAVENOUS AS NEEDED
Status: CANCELLED
Start: 2022-10-13

## 2022-04-14 RX ORDER — HYDROCORTISONE SODIUM SUCCINATE 100 MG/2ML
100 INJECTION, POWDER, FOR SOLUTION INTRAMUSCULAR; INTRAVENOUS AS NEEDED
Status: CANCELLED | OUTPATIENT
Start: 2022-10-13

## 2022-04-14 RX ADMIN — DENOSUMAB 60 MG: 60 INJECTION SUBCUTANEOUS at 15:44

## 2022-04-14 NOTE — PROGRESS NOTES
[facility] Short Visit Progress Note     Patient admitted to [facility] for prolia ambulatory in stable condition. Assessment completed. No new concerns voiced. No dental work recently done. Vital Signs:  Visit Vitals  /75 (BP 1 Location: Left arm, BP Patient Position: Sitting)   Pulse 79   Temp 98.8 °F (37.1 °C)   Resp 18   Ht 5' 2\" (1.575 m)   Wt 67.6 kg (149 lb)   SpO2 95%   BMI 27.25 kg/m²         Left arm with positive blood return. Drawn by Wally Stephen PCT  Lab Results:  Recent Results (from the past 12 hour(s))   POC CHEM8    Collection Time: 04/14/22  3:32 PM   Result Value Ref Range    Calcium, ionized (POC) 1.14 1.12 - 1.32 mmol/L    Sodium (POC) 143 136 - 145 mmol/L    Potassium (POC) 4.0 3.5 - 5.1 mmol/L    Chloride (POC) 107 98 - 107 mmol/L    CO2 (POC) 27.6 21 - 32 mmol/L    Anion gap (POC) 9 (L) 10 - 20 mmol/L    Glucose (POC) 77 65 - 100 mg/dL    Creatinine (POC) 0.68 0.6 - 1.3 mg/dL    GFRAA, POC >60 >60 ml/min/1.73m2    GFRNA, POC >60 >60 ml/min/1.73m2    Comment Comment Not Indicated. Medications:  Medications Administered     denosumab (PROLIA) injection 60 mg     Admin Date  04/14/2022 Action  Given Dose  60 mg Route  SubCUTAneous Administered By  Nomi Sousaa            R arm SQ    Patient tolerated treatment well. Patient discharged from [place] [address] ambulatory in no distress. Patient aware of next appointment.     Darlene Cristina RN

## 2022-05-02 ENCOUNTER — VIRTUAL VISIT (OUTPATIENT)
Dept: INTERNAL MEDICINE CLINIC | Age: 72
End: 2022-05-02
Payer: MEDICARE

## 2022-05-02 ENCOUNTER — NURSE TRIAGE (OUTPATIENT)
Dept: OTHER | Facility: CLINIC | Age: 72
End: 2022-05-02

## 2022-05-02 DIAGNOSIS — M54.41 ACUTE BACK PAIN WITH SCIATICA, RIGHT: Primary | ICD-10-CM

## 2022-05-02 DIAGNOSIS — I10 ESSENTIAL HYPERTENSION: ICD-10-CM

## 2022-05-02 DIAGNOSIS — I70.0 AORTIC CALCIFICATION (HCC): ICD-10-CM

## 2022-05-02 PROCEDURE — 1090F PRES/ABSN URINE INCON ASSESS: CPT | Performed by: INTERNAL MEDICINE

## 2022-05-02 PROCEDURE — G0463 HOSPITAL OUTPT CLINIC VISIT: HCPCS | Performed by: INTERNAL MEDICINE

## 2022-05-02 PROCEDURE — 1101F PT FALLS ASSESS-DOCD LE1/YR: CPT | Performed by: INTERNAL MEDICINE

## 2022-05-02 PROCEDURE — G8756 NO BP MEASURE DOC: HCPCS | Performed by: INTERNAL MEDICINE

## 2022-05-02 PROCEDURE — G8427 DOCREV CUR MEDS BY ELIG CLIN: HCPCS | Performed by: INTERNAL MEDICINE

## 2022-05-02 PROCEDURE — G9717 DOC PT DX DEP/BP F/U NT REQ: HCPCS | Performed by: INTERNAL MEDICINE

## 2022-05-02 PROCEDURE — 3017F COLORECTAL CA SCREEN DOC REV: CPT | Performed by: INTERNAL MEDICINE

## 2022-05-02 PROCEDURE — 99213 OFFICE O/P EST LOW 20 MIN: CPT | Performed by: INTERNAL MEDICINE

## 2022-05-02 RX ORDER — METHENAMINE HIPPURATE 1000 MG/1
TABLET ORAL
COMMUNITY
Start: 2022-04-21

## 2022-05-02 RX ORDER — METHYLPREDNISOLONE 4 MG/1
TABLET ORAL
Qty: 1 DOSE PACK | Refills: 0 | Status: SHIPPED | OUTPATIENT
Start: 2022-05-02

## 2022-05-02 RX ORDER — SULFAMETHOXAZOLE AND TRIMETHOPRIM 800; 160 MG/1; MG/1
TABLET ORAL
COMMUNITY
Start: 2022-04-22

## 2022-05-02 RX ORDER — HYDROCODONE BITARTRATE AND ACETAMINOPHEN 5; 325 MG/1; MG/1
1 TABLET ORAL
Qty: 5 TABLET | Refills: 0 | Status: SHIPPED | OUTPATIENT
Start: 2022-05-02 | End: 2022-05-07

## 2022-05-02 NOTE — PROGRESS NOTES
This is an established visit conducted via telemedicine. The patient has been instructed that this meets HIPAA criteria and acknowledges and agrees to this method of visitation. Send link to mobile number   Telephone Information:   Mobile [phone]       Identified pt with two pt identifiers(name and ). 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 pt rates pain 7/8 right side of back, wallking feels better than sitting statnding or laying down, rang of motion in the right leg is limited        Health Maintenance Due   Topic Date Due    Colorectal Screening  Never done    Shingles Vaccine (1 of 2) Never done    Mammogram  Never done    DTaP/Tdap/Td  (1 - Tdap) 2008    Pneumococcal Vaccine (2 - PPSV23 or PCV20) 2018    COVID-19 Vaccine (3 - Booster for [employer] series) 2021    Depression Monitoring  2021    Annual Well Visit  2021    Cholesterol Test   04/15/2022       Ms. Ajay Altman has a reminder for a \"due or due soon\" health maintenance. I have asked that she discuss this further with her primary care provider for follow-up on this health maintenance.

## 2022-05-02 NOTE — TELEPHONE ENCOUNTER
Received call from Luisa Ernst at [facility] with Red Flag Complaint. Subjective: Caller states \"Saturday I was at work and I picked up at patient and I twisted my back. I have major back issues already thought. \"     Current Symptoms: back injury    Onset: Saturday    Associated Symptoms: NA    Pain Severity: 6/10; hurts when I move certain ways    Temperature: denies     What has been tried: Tylenol     LMP: NA Pregnant: NA    Recommended disposition: Go to Office Now    Care advice provided, patient verbalizes understanding; denies any other questions or concerns; instructed to call back for any new or worsening symptoms. Patient/Caller agrees with recommended disposition; writer provided warm transfer to [place] at [facility] for appointment scheduling    Attention Provider: Thank you for allowing me to participate in the care of your patient. The patient was connected to triage in response to information provided to the [facility]. Please do not respond through this encounter as the response is not directed to a shared pool.         Reason for Disposition   SEVERE back pain (e.g., excruciating, unable to do any normal activities) and not improved after pain medicine and CARE ADVICE    Protocols used: BACK INJURY-ADULT-[place]

## 2022-05-02 NOTE — PROGRESS NOTES
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 TECHNIQUE: Multiplanar multisequence acquisition without contrast of the lumbar  spine. CONTRAST: None. FINDINGS:  The last well-formed disk is designated as L5-S1 for the purpose of this report. Vertebral bodies were numbered using this convention. Acute anterior compression fracture of L2 with mild height loss, approximately  15%. No retropulsion. No other acute fractures identified with remaining  vertebral body heights preserved. Dextroscoliosis of the lumbar spine centered at L2-L3. Grade 1 anterolisthesis  of L4 on L5 measuring 2 mm. Other than the acute fracture, marrow signal is  normal. Multilevel degenerative disc disease and facet arthropathy as detailed  below, overall not significant changed since prior exam. The conus is normal in  size and signal and terminates at L1-L2. The cauda equina is unremarkable. Visualized soft tissues are unremarkable. T12-L1: No significant disc herniation, spinal canal or neural foraminal  stenosis. L1-L2: No significant disc herniation, spinal canal or neural foraminal  stenosis. L2-L3: Minimal diffuse disc bulge eccentric to the left. No significant spinal  canal stenosis.  Mild left and no right neural foraminal stenosis. L3-L4: Diffuse disc bulge. Mild spinal canal stenosis. Mild left and no right  neural foraminal stenosis. L4-L5: Grade 1 anterolisthesis with uncovering of the disc. Severe bilateral  facet arthropathy with ligamentum flavum thickening. No significant spinal canal  stenosis. Mild right and no left neural foraminal stenosis. L5-S1: Diffuse disc bulge. Severe right and mild left facet arthropathy. No  significant spinal canal stenosis. No significant neural foraminal stenosis. Impression  IMPRESSION:  1. Acute L2 anterior compression fracture with mild height loss and no  retropulsion. 2. Multilevel degenerative changes as detailed above, overall not significantly  changed since 2017. 3. Unchanged grade 1 anterolisthesis of L4 on L5 and dextroscoliosis of the  lumbar spine. 23X      I was at home while conducting this encounter. Consent:  She and/or her healthcare decision maker is aware that this patient-initiated Telehealth encounter is a billable service, with coverage as determined by her insurance carrier. She is aware that she may receive a bill and has provided verbal consent to proceed: Yes    This virtual visit was conducted via [employer]. Pursuant to the emergency declaration under the [facility], [facility] waiver authority and the [employer] General Act, this Virtual  Visit was conducted to reduce the patient's risk of exposure to COVID-19 and provide continuity of care for an established patient. Services were provided through a video synchronous discussion virtually to substitute for in-person clinic visit. Due to this being a TeleHealth evaluation, many elements of the physical examination are unable to be assessed. Total Time: minutes: 11-20 minutes. At work  Overdue labs  No exam     1.  Acute back pain with sciatica, right  Now  5 days hydrocodone at [facility] last given 2020  - methylPREDNISolone (MEDROL DOSEPACK) 4 mg tablet; As directed  Dispense: 1 Dose Pack; Refill: 0  - HYDROcodone-acetaminophen (NORCO) 5-325 mg per tablet; Take 1 Tablet by mouth nightly for 5 days. Max Daily Amount: 1 Tablet. Dispense: 5 Tablet; Refill: 0    2. Aortic calcification (HCC)  On too low a dose     3. Essential hypertension  ? ??  - CBC WITH AUTOMATED DIFF; Future  - LIPID PANEL; Future  - METABOLIC PANEL, COMPREHENSIVE; Future  - MAGNESIUM;  Future

## 2022-07-10 RX ORDER — BUPROPION HYDROCHLORIDE 150 MG/1
TABLET, EXTENDED RELEASE ORAL
Qty: 180 TABLET | Refills: 1 | Status: SHIPPED | OUTPATIENT
Start: 2022-07-10

## 2022-07-10 RX ORDER — FELODIPINE 10 MG/1
TABLET, EXTENDED RELEASE ORAL
Qty: 90 TABLET | Refills: 1 | Status: SHIPPED | OUTPATIENT
Start: 2022-07-10

## 2022-08-30 ENCOUNTER — APPOINTMENT (OUTPATIENT)
Dept: CT IMAGING | Age: 72
End: 2022-08-30
Attending: EMERGENCY MEDICINE
Payer: MEDICARE

## 2022-08-30 ENCOUNTER — HOSPITAL ENCOUNTER (EMERGENCY)
Age: 72
Discharge: HOME | End: 2022-08-30
Attending: EMERGENCY MEDICINE
Payer: MEDICARE

## 2022-08-30 VITALS
OXYGEN SATURATION: 96 % | TEMPERATURE: 97.7 F | RESPIRATION RATE: 15 BRPM | DIASTOLIC BLOOD PRESSURE: 79 MMHG | SYSTOLIC BLOOD PRESSURE: 136 MMHG | HEART RATE: 74 BPM

## 2022-08-30 DIAGNOSIS — R06.00 DYSPNEA, UNSPECIFIED TYPE: Primary | ICD-10-CM

## 2022-08-30 DIAGNOSIS — M54.6 ACUTE MIDLINE THORACIC BACK PAIN: ICD-10-CM

## 2022-08-30 LAB
ANION GAP SERPL CALC-SCNC: 7 MMOL/L (ref 5–15)
ATRIAL RATE: 80 BPM
BASOPHILS # BLD: 0 K/UL (ref 0–0.1)
BASOPHILS NFR BLD: 1 % (ref 0–1)
BNP SERPL-MCNC: 107 PG/ML (ref 0–125)
BUN SERPL-MCNC: 13 MG/DL (ref 6–20)
BUN/CREAT SERPL: 24 (ref 12–20)
CALCIUM SERPL-MCNC: 7 MG/DL (ref 8.5–10.1)
CALCULATED P AXIS, ECG09: 53 DEGREES
CALCULATED R AXIS, ECG10: -17 DEGREES
CALCULATED T AXIS, ECG11: 25 DEGREES
CHLORIDE SERPL-SCNC: 112 MMOL/L (ref 97–108)
CO2 SERPL-SCNC: 22 MMOL/L (ref 21–32)
CREAT SERPL-MCNC: 0.54 MG/DL (ref 0.55–1.02)
DIAGNOSIS, 93000: NORMAL
DIFFERENTIAL METHOD BLD: ABNORMAL
EOSINOPHIL # BLD: 0.1 K/UL (ref 0–0.4)
EOSINOPHIL NFR BLD: 1 % (ref 0–7)
ERYTHROCYTE [DISTWIDTH] IN BLOOD BY AUTOMATED COUNT: 12.8 % (ref 11.5–14.5)
GLUCOSE SERPL-MCNC: 72 MG/DL (ref 65–100)
HCT VFR BLD AUTO: 42.4 % (ref 35–47)
HGB BLD-MCNC: 13.7 G/DL (ref 11.5–16)
IMM GRANULOCYTES # BLD AUTO: 0 K/UL (ref 0–0.04)
IMM GRANULOCYTES NFR BLD AUTO: 0 % (ref 0–0.5)
LYMPHOCYTES # BLD: 1.8 K/UL (ref 0.8–3.5)
LYMPHOCYTES NFR BLD: 32 % (ref 12–49)
MCH RBC QN AUTO: 29.5 PG (ref 26–34)
MCHC RBC AUTO-ENTMCNC: 32.3 G/DL (ref 30–36.5)
MCV RBC AUTO: 91.4 FL (ref 80–99)
MONOCYTES # BLD: 0.5 K/UL (ref 0–1)
MONOCYTES NFR BLD: 9 % (ref 5–13)
NEUTS SEG # BLD: 3.3 K/UL (ref 1.8–8)
NEUTS SEG NFR BLD: 58 % (ref 32–75)
NRBC # BLD: 0 K/UL (ref 0–0.01)
NRBC BLD-RTO: 0 PER 100 WBC
P-R INTERVAL, ECG05: 146 MS
PLATELET # BLD AUTO: 410 K/UL (ref 150–400)
PMV BLD AUTO: 9.2 FL (ref 8.9–12.9)
POTASSIUM SERPL-SCNC: 3 MMOL/L (ref 3.5–5.1)
Q-T INTERVAL, ECG07: 368 MS
QRS DURATION, ECG06: 78 MS
QTC CALCULATION (BEZET), ECG08: 424 MS
RBC # BLD AUTO: 4.64 M/UL (ref 3.8–5.2)
SODIUM SERPL-SCNC: 141 MMOL/L (ref 136–145)
TROPONIN-HIGH SENSITIVITY: 6 NG/L (ref 0–51)
VENTRICULAR RATE, ECG03: 80 BPM
WBC # BLD AUTO: 5.6 K/UL (ref 3.6–11)

## 2022-08-30 PROCEDURE — 71275 CT ANGIOGRAPHY CHEST: CPT

## 2022-08-30 PROCEDURE — 80048 BASIC METABOLIC PNL TOTAL CA: CPT

## 2022-08-30 PROCEDURE — 74011250636 HC RX REV CODE- 250/636: Performed by: EMERGENCY MEDICINE

## 2022-08-30 PROCEDURE — 74011000636 HC RX REV CODE- 636: Performed by: EMERGENCY MEDICINE

## 2022-08-30 PROCEDURE — 84484 ASSAY OF TROPONIN QUANT: CPT

## 2022-08-30 PROCEDURE — 85025 COMPLETE CBC W/AUTO DIFF WBC: CPT

## 2022-08-30 PROCEDURE — 93005 ELECTROCARDIOGRAM TRACING: CPT

## 2022-08-30 PROCEDURE — 99285 EMERGENCY DEPT VISIT HI MDM: CPT

## 2022-08-30 PROCEDURE — 96374 THER/PROPH/DIAG INJ IV PUSH: CPT

## 2022-08-30 PROCEDURE — 83880 ASSAY OF NATRIURETIC PEPTIDE: CPT

## 2022-08-30 PROCEDURE — 36415 COLL VENOUS BLD VENIPUNCTURE: CPT

## 2022-08-30 RX ORDER — POTASSIUM CHLORIDE 750 MG/1
20 TABLET, FILM COATED, EXTENDED RELEASE ORAL
Status: DISCONTINUED | OUTPATIENT
Start: 2022-08-30 | End: 2022-08-30 | Stop reason: HOSPADM

## 2022-08-30 RX ORDER — KETOROLAC TROMETHAMINE 30 MG/ML
15 INJECTION, SOLUTION INTRAMUSCULAR; INTRAVENOUS
Status: COMPLETED | OUTPATIENT
Start: 2022-08-30 | End: 2022-08-30

## 2022-08-30 RX ADMIN — IOPAMIDOL 100 ML: 755 INJECTION, SOLUTION INTRAVENOUS at 11:00

## 2022-08-30 RX ADMIN — KETOROLAC TROMETHAMINE 15 MG: 30 INJECTION, SOLUTION INTRAMUSCULAR at 09:02

## 2022-08-30 NOTE — PROGRESS NOTES
Re: Ketorolac    Ketorolac dose adjusted to 15 mg IV per P&T approved protocol for patient greater than 72years of age.     Fredi Wahl, Pharmacist

## 2022-08-30 NOTE — ED TRIAGE NOTES
Pt states as she was getting ready for work she felt a sharp stabbing pain in her back. Pain is positional Right mid back; pt denied injury and fall, CP. Pt ambulated to room without difficulty able to speak in full sentences without difficulty.  Pt reports some dyspnea with exertion

## 2022-08-30 NOTE — ED NOTES
The patient was discharged home by Dr Tara Valencia in stable condition. The patient is alert and oriented, in no respiratory distress. The patient's diagnosis, condition and treatment were explained. The patient expressed understanding. A discharge plan has been developed. A  was not involved in the process. Aftercare instructions were given. Pt ambulatory out of the ED.

## 2022-08-30 NOTE — ED NOTES
CT refused to use the left forearm diffusics IV, with good placement and excellent blood return; Dr Yuniel Harkins was required to use an US to place a dual port 18 ga ca in right ac site for that. Removed prior to being DC'd to home.

## 2022-08-30 NOTE — ED PROVIDER NOTES
71F w/ hx HTN and smoking p/w back pain and dyspnea. Pt reports woke up this AM w/ sharp mid thoracic back pain worse w/ deep inspiration and dyspnea w/ activity. No chest or abd pain. No ext weakness or numbness. No F/C, cough, wheezing, dizziness or syncope. No N/V/D. 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 Denies any cardiac hx. No recent surgeries, hospitalizations, travel, hx of malignancy, exogenous estrogen use, hemoptysis, LE pain/swelling, or hx of PE/DVT. 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     Sunburn, blistering     Tanning bed exposure        Past Surgical History:   Procedure Laterality Date    HX  SECTION  2900,2079,7779     x 3    HX CHOLECYSTECTOMY  1974    NEUROLOGICAL PROCEDURE UNLISTED      synovial cyst t spine         Family History:   Problem Relation Age of Onset    Parkinsonism Mother     Hypertension Father     Diabetes Father     Heart Disease Father 61               Social History     Socioeconomic History    Marital status:      Spouse name: Not on file    Number of children: Not on file    Years of education: Not on file    Highest education level: Not on file   Occupational History    Not on file   Tobacco Use    Smoking status: Former     Packs/day: 0.25     Years: 25.00     Pack years: 6.25     Types: Cigarettes     Quit date: 2020     Years since quittin.6    Smokeless tobacco: Former     Quit date: 2014   Vaping Use    Vaping Use: Never used   Substance and Sexual Activity    Alcohol use:  Yes Alcohol/week: 3.0 standard drinks     Types: 3 Glasses of wine per week     Comment: weekends    Drug use: No    Sexual activity: Not Currently   Other Topics Concern    Not on file   Social History Narrative    Not on file     Social Determinants of Health     Financial Resource Strain: Not on file   Food Insecurity: Not on file   Transportation Needs: Not on file   Physical Activity: Not on file   Stress: Not on file   Social Connections: Not on file   Intimate Partner Violence: Not on file   Housing Stability: Not on file         ALLERGIES: Codeine, Fosamax [alendronate], and Keflex [cephalexin]    Review of Systems   Constitutional:  Negative for chills, diaphoresis and fever. HENT:  Negative for facial swelling, mouth sores, nosebleeds, trouble swallowing and voice change. Eyes:  Negative for pain and visual disturbance. Respiratory:  Positive for shortness of breath. Negative for apnea, cough, choking, wheezing and stridor. Cardiovascular:  Negative for chest pain, palpitations and leg swelling. Gastrointestinal:  Negative for abdominal distention, abdominal pain, blood in stool, diarrhea, nausea and vomiting. Genitourinary:  Negative for difficulty urinating, dysuria, flank pain, hematuria and pelvic pain. Musculoskeletal:  Positive for back pain. Negative for joint swelling. Skin:  Negative for color change and rash. Allergic/Immunologic: Negative for immunocompromised state. Neurological:  Negative for dizziness, seizures, syncope, speech difficulty and light-headedness. Hematological:  Does not bruise/bleed easily. Psychiatric/Behavioral:  Negative for agitation and behavioral problems. Vitals:    08/30/22 0835 08/30/22 0916 08/30/22 1100 08/30/22 1200   BP: (!) 153/81 (!) 140/70 130/82 136/79   Pulse: 82 81 79 74   Resp: 14 15 16 15   Temp:       SpO2: 97% 94% 98% 96%            Physical Exam  Vitals and nursing note reviewed.    Constitutional:       General: She is not in acute distress. Appearance: Normal appearance. She is not ill-appearing or toxic-appearing. HENT:      Head: Normocephalic and atraumatic. Right Ear: External ear normal.      Left Ear: External ear normal.      Nose: Nose normal.      Mouth/Throat:      Mouth: Mucous membranes are moist.      Pharynx: Oropharynx is clear. No oropharyngeal exudate or posterior oropharyngeal erythema. Eyes:      General: No scleral icterus. Extraocular Movements: Extraocular movements intact. Conjunctiva/sclera: Conjunctivae normal.      Pupils: Pupils are equal, round, and reactive to light. Cardiovascular:      Rate and Rhythm: Normal rate and regular rhythm. Pulses: Normal pulses. Heart sounds: Normal heart sounds. No murmur heard. No friction rub. No gallop. Pulmonary:      Effort: Pulmonary effort is normal. No respiratory distress. Breath sounds: Normal breath sounds. No stridor. No wheezing, rhonchi or rales. Abdominal:      General: There is no distension. Palpations: Abdomen is soft. Tenderness: There is no abdominal tenderness. There is no guarding or rebound. Musculoskeletal:         General: No tenderness or deformity. Normal range of motion. Cervical back: Normal range of motion and neck supple. No rigidity. Right lower leg: No edema. Left lower leg: No edema. Skin:     General: Skin is warm. Capillary Refill: Capillary refill takes less than 2 seconds. Coloration: Skin is not jaundiced. Neurological:      General: No focal deficit present. Mental Status: She is alert. Cranial Nerves: No cranial nerve deficit. Sensory: No sensory deficit. Motor: No weakness. Coordination: Coordination normal.   Psychiatric:         Mood and Affect: Mood normal.         Behavior: Behavior normal.         Thought Content:  Thought content normal.         Judgment: Judgment normal.      EKG Interpretation   SR, narrow QRS, nl intervals, no MARLA/STD/TWI  (EKG tracing interpreted by ED physician)    LABORATORY TESTS:  Recent Results (from the past 24 hour(s))   CBC WITH AUTOMATED DIFF    Collection Time: 08/30/22  8:29 AM   Result Value Ref Range    WBC 5.6 3.6 - 11.0 K/uL    RBC 4.64 3.80 - 5.20 M/uL    HGB 13.7 11.5 - 16.0 g/dL    HCT 42.4 35.0 - 47.0 %    MCV 91.4 80.0 - 99.0 FL    MCH 29.5 26.0 - 34.0 PG    MCHC 32.3 30.0 - 36.5 g/dL    RDW 12.8 11.5 - 14.5 %    PLATELET 745 (H) 048 - 400 K/uL    MPV 9.2 8.9 - 12.9 FL    NRBC 0.0 0.0  WBC    ABSOLUTE NRBC 0.00 0.00 - 0.01 K/uL    NEUTROPHILS 58 32 - 75 %    LYMPHOCYTES 32 12 - 49 %    MONOCYTES 9 5 - 13 %    EOSINOPHILS 1 0 - 7 %    BASOPHILS 1 0 - 1 %    IMMATURE GRANULOCYTES 0 0 - 0.5 %    ABS. NEUTROPHILS 3.3 1.8 - 8.0 K/UL    ABS. LYMPHOCYTES 1.8 0.8 - 3.5 K/UL    ABS. MONOCYTES 0.5 0.0 - 1.0 K/UL    ABS. EOSINOPHILS 0.1 0.0 - 0.4 K/UL    ABS. BASOPHILS 0.0 0.0 - 0.1 K/UL    ABS. IMM.  GRANS. 0.0 0.00 - 0.04 K/UL    DF AUTOMATED     METABOLIC PANEL, BASIC    Collection Time: 08/30/22  8:29 AM   Result Value Ref Range    Sodium 141 136 - 145 mmol/L    Potassium 3.0 (L) 3.5 - 5.1 mmol/L    Chloride 112 (H) 97 - 108 mmol/L    CO2 22 21 - 32 mmol/L    Anion gap 7 5 - 15 mmol/L    Glucose 72 65 - 100 mg/dL    BUN 13 6 - 20 MG/DL    Creatinine 0.54 (L) 0.55 - 1.02 MG/DL    BUN/Creatinine ratio 24 (H) 12 - 20      GFR est AA >60 >60 ml/min/1.73m2    GFR est non-AA >60 >60 ml/min/1.73m2    Calcium 7.0 (L) 8.5 - 10.1 MG/DL   TROPONIN-HIGH SENSITIVITY    Collection Time: 08/30/22  8:29 AM   Result Value Ref Range    Troponin-High Sensitivity 6 0 - 51 ng/L   NT-PRO BNP    Collection Time: 08/30/22  8:29 AM   Result Value Ref Range    NT pro- 0 - 125 PG/ML   EKG, 12 LEAD, INITIAL    Collection Time: 08/30/22  8:29 AM   Result Value Ref Range    Ventricular Rate 80 BPM    Atrial Rate 80 BPM    P-R Interval 146 ms    QRS Duration 78 ms    Q-T Interval 368 ms    QTC Calculation (Bezet) 424 ms    Calculated P Axis 53 degrees    Calculated R Axis -17 degrees    Calculated T Axis 25 degrees    Diagnosis       Normal sinus rhythm  Minimal voltage criteria for LVH, may be normal variant ( R in aVL )  Nonspecific ST abnormality  Abnormal ECG  When compared with ECG of 20-JUN-2012 08:45,  No significant change was found  Confirmed by Emmett Guadalupe M.D., Kriss Richmond ([id number]) on 8/30/2022 11:30:33 AM           IMAGING RESULTS:  CTA CHEST W OR W WO CONT   Final Result   No acute process or evidence of pulmonary embolism. MEDICATIONS GIVEN:  Medications   ketorolac (TORADOL) injection 15 mg (15 mg IntraVENous Given 8/30/22 0902)   iopamidoL (ISOVUE-370) 76 % injection 100 mL (100 mL IntraVENous Given 8/30/22 1100)       PROGRESS NOTE:   10:44 AM Patient's symptoms have improved after treatment    CONSULTS:  none    IMPRESSION:  1. Dyspnea, unspecified type    2. Acute midline thoracic back pain        PLAN:  - Discharge    Juancarlos Mcneil MD      MDM  Number of Diagnoses or Management Options  Acute midline thoracic back pain  Dyspnea, unspecified type  Diagnosis management comments: 71F w/ hx HTN and smoking p/w back pain and dyspnea x1d. Pt nontoxic appearing, afebrile, hemodynamically stable w/o resp distress or hypoxia. Ddx includes PNA vs URI/bronchitis/flu/COVID19 vs COPD/asthma exacerbation vs decompensated CHF vs PNX vs pulm edema/pleural effusion vs valvular heart disease vs pulm fibrosis/HTN vs interstitial lung disease vs malignancy/mets vs ACS vs cardiac dysrythmia vs HTN emergency/urgency vs symptomatic anemia vs electrolyte/metabolic, less likely PE based on [place] score, unlikely pericardial effusion/tamponade based on presentation. Ordered CTA chest, EKG, labs. Monitor and reassess. 1030 Pt remains hemodynamically stable and feeling better after toradol. CTA chest neg for PE, PNA or dissection. EKG SR w/o ischemic changes. Trop neg x1. Doubt atypical ACS.  BNP is Wnl and not volume overloaded so doubt acute CHF. Possibly MSK sprain/strain. Advised to continue nsaids/tylenol. Patient given specific return precautions and explained signs/symptoms for which to come back to ED immediately but otherwise advised to f/u w/ PCP over next 2-3days.          Amount and/or Complexity of Data Reviewed  Clinical lab tests: ordered and reviewed  Tests in the radiology section of CPT®: ordered and reviewed  Tests in the medicine section of CPT®: reviewed and ordered  Independent visualization of images, tracings, or specimens: yes           Procedures

## 2022-10-06 NOTE — CALL BACK NOTE
OK to treat order required for patient's upcoming [facility] appointment.  Sent fax request to doctor's office on 10/06/22 at 10:00 AM.

## 2022-10-10 NOTE — CALL BACK NOTE
OK to treat order required for patient's upcoming [facility] appointment.  Sent fax request to doctor's office on 10/10/22 at 7:27 AM.

## 2022-10-10 NOTE — CALL BACK NOTE
MD Lauren Johnson, PHARMD  OK from my view, I have not seen her           Previous Messages     ----- Message -----   From: Lauren Wilson PHARMD   Sent: 10/10/2022   7:27 AM EDT   To: Romeo Bean MD   Subject: [id number] Coral Ramirez to treat needed                               Dr. Ayde Berkowitz,     This patient was seen in ED on 8/30. She has a Prolia appt in the [facility]   coming up on 10/13. Please review ED visit and let us know if it is OK to   continue with her treatment on 10/13. Thank you.

## 2022-10-13 ENCOUNTER — HOSPITAL ENCOUNTER (OUTPATIENT)
Dept: INFUSION THERAPY | Age: 72
Discharge: HOME | End: 2022-10-13
Payer: MEDICARE

## 2022-10-13 VITALS
HEART RATE: 83 BPM | HEIGHT: 62 IN | DIASTOLIC BLOOD PRESSURE: 80 MMHG | OXYGEN SATURATION: 98 % | TEMPERATURE: 98.2 F | SYSTOLIC BLOOD PRESSURE: 137 MMHG | RESPIRATION RATE: 20 BRPM | WEIGHT: 148.1 LBS | BODY MASS INDEX: 27.25 KG/M2

## 2022-10-13 DIAGNOSIS — M81.0 OSTEOPOROSIS, UNSPECIFIED OSTEOPOROSIS TYPE, UNSPECIFIED PATHOLOGICAL FRACTURE PRESENCE: Primary | ICD-10-CM

## 2022-10-13 LAB
ANION GAP BLD CALC-SCNC: 11 MMOL/L (ref 10–20)
CA-I BLD-MCNC: 1.19 MMOL/L (ref 1.12–1.32)
CHLORIDE BLD-SCNC: 107 MMOL/L (ref 98–107)
CO2 BLD-SCNC: 28 MMOL/L (ref 21–32)
CREAT BLD-MCNC: 0.71 MG/DL (ref 0.6–1.3)
GLUCOSE BLD-MCNC: 78 MG/DL (ref 65–100)
MAGNESIUM SERPL-MCNC: 2.2 MG/DL (ref 1.6–2.4)
PHOSPHATE SERPL-MCNC: 3.9 MG/DL (ref 2.6–4.7)
POTASSIUM BLD-SCNC: 4 MMOL/L (ref 3.5–5.1)
SERVICE CMNT-IMP: NORMAL
SODIUM BLD-SCNC: 145 MMOL/L (ref 136–145)

## 2022-10-13 PROCEDURE — 80047 BASIC METABLC PNL IONIZED CA: CPT

## 2022-10-13 PROCEDURE — 84100 ASSAY OF PHOSPHORUS: CPT

## 2022-10-13 PROCEDURE — 83735 ASSAY OF MAGNESIUM: CPT

## 2022-10-13 PROCEDURE — 96372 THER/PROPH/DIAG INJ SC/IM: CPT

## 2022-10-13 PROCEDURE — 36415 COLL VENOUS BLD VENIPUNCTURE: CPT

## 2022-10-13 PROCEDURE — 74011250636 HC RX REV CODE- 250/636: Performed by: INTERNAL MEDICINE

## 2022-10-13 PROCEDURE — 80048 BASIC METABOLIC PNL TOTAL CA: CPT

## 2022-10-13 RX ORDER — ACETAMINOPHEN 325 MG/1
650 TABLET ORAL AS NEEDED
Start: 2023-04-13

## 2022-10-13 RX ORDER — ALBUTEROL SULFATE 0.83 MG/ML
2.5 SOLUTION RESPIRATORY (INHALATION) AS NEEDED
Start: 2023-04-13

## 2022-10-13 RX ORDER — ONDANSETRON 2 MG/ML
8 INJECTION INTRAMUSCULAR; INTRAVENOUS AS NEEDED
OUTPATIENT
Start: 2023-04-13

## 2022-10-13 RX ORDER — DIPHENHYDRAMINE HYDROCHLORIDE 50 MG/ML
50 INJECTION, SOLUTION INTRAMUSCULAR; INTRAVENOUS AS NEEDED
Start: 2023-04-13

## 2022-10-13 RX ORDER — HYDROCORTISONE SODIUM SUCCINATE 100 MG/2ML
100 INJECTION, POWDER, FOR SOLUTION INTRAMUSCULAR; INTRAVENOUS AS NEEDED
OUTPATIENT
Start: 2023-04-13

## 2022-10-13 RX ORDER — DIPHENHYDRAMINE HYDROCHLORIDE 50 MG/ML
25 INJECTION, SOLUTION INTRAMUSCULAR; INTRAVENOUS AS NEEDED
Start: 2023-04-13

## 2022-10-13 RX ORDER — EPINEPHRINE 1 MG/ML
0.3 INJECTION, SOLUTION, CONCENTRATE INTRAVENOUS AS NEEDED
OUTPATIENT
Start: 2023-04-13

## 2022-10-13 RX ADMIN — DENOSUMAB 60 MG: 60 INJECTION SUBCUTANEOUS at 15:59

## 2022-10-13 NOTE — PROGRESS NOTES
[facility] Progress Note    Date: 2022    Name: Portillo Ramirez    MRN: [id number]         : 1950    Ms. Milla Romero arrived ambulatory and in no distress for Prolia Injection. Assessment was completed, no acute issues at this time, no new complaints voiced. Labs drawn for processing. Patient denies any recent dental work. Ms. Rudolph Willoughby vitals were reviewed. Visit Vitals  /80   Pulse 83   Temp 98.2 °F (36.8 °C)   Resp 20   Ht 5' 2\" (1.575 m)   Wt 67.2 kg (148 lb 1.6 oz)   SpO2 98%   Breastfeeding No   BMI 27.09 kg/m²     Recent Results (from the past 24 hour(s))   POC CHEM8    Collection Time: 10/13/22  3:39 PM   Result Value Ref Range    Calcium, ionized (POC) 1.19 1.12 - 1.32 mmol/L    Sodium (POC) 145 136 - 145 mmol/L    Potassium (POC) 4.0 3.5 - 5.1 mmol/L    Chloride (POC) 107 98 - 107 mmol/L    CO2 (POC) 28.0 21 - 32 mmol/L    Anion gap (POC) 11 10 - 20 mmol/L    Glucose (POC) 78 65 - 100 mg/dL    Creatinine (POC) 0.71 0.6 - 1.3 mg/dL    eGFR (POC) >60 >60 ml/min/1.73m2    Comment Comment Not Indicated. Medications:  Medications Administered       denosumab (PROLIA) injection 60 mg       Admin Date  10/13/2022 Action  Given Dose  60 mg Route  SubCUTAneous Administered By  Aravind Kang RN                     Ms. Milla Romero tolerated treatment well and was discharged from [place] [address] in stable condition. Patient thought this might be her last injection, so she will contact Dr. Kal Rice to confirm. If not, she will call to make a follow-up appointment in April.     Chasity Diaz RN  2022

## 2023-01-07 RX ORDER — FELODIPINE 10 MG/1
TABLET, EXTENDED RELEASE ORAL
Qty: 90 TABLET | Refills: 1 | Status: SHIPPED | OUTPATIENT
Start: 2023-01-07

## 2023-01-07 RX ORDER — BUPROPION HYDROCHLORIDE 150 MG/1
TABLET, EXTENDED RELEASE ORAL
Qty: 180 TABLET | Refills: 1 | Status: SHIPPED | OUTPATIENT
Start: 2023-01-07

## 2023-02-03 ENCOUNTER — OFFICE VISIT (OUTPATIENT)
Dept: INTERNAL MEDICINE CLINIC | Age: 73
End: 2023-02-03
Payer: MEDICARE

## 2023-02-03 VITALS
RESPIRATION RATE: 19 BRPM | HEART RATE: 90 BPM | BODY MASS INDEX: 26.87 KG/M2 | HEIGHT: 62 IN | TEMPERATURE: 98.6 F | WEIGHT: 146 LBS | DIASTOLIC BLOOD PRESSURE: 89 MMHG | SYSTOLIC BLOOD PRESSURE: 160 MMHG | OXYGEN SATURATION: 97 %

## 2023-02-03 DIAGNOSIS — R10.9 FLANK PAIN: ICD-10-CM

## 2023-02-03 DIAGNOSIS — R10.2 PAIN IN PELVIS: Primary | ICD-10-CM

## 2023-02-03 DIAGNOSIS — E78.00 PURE HYPERCHOLESTEROLEMIA: ICD-10-CM

## 2023-02-03 DIAGNOSIS — R31.9 HEMATURIA, UNSPECIFIED TYPE: ICD-10-CM

## 2023-02-03 RX ORDER — HYDROCODONE BITARTRATE AND ACETAMINOPHEN 5; 325 MG/1; MG/1
1 TABLET ORAL
Qty: 10 TABLET | Refills: 0 | Status: SHIPPED | OUTPATIENT
Start: 2023-02-03 | End: 2023-02-08

## 2023-02-03 RX ORDER — CYCLOBENZAPRINE HCL 10 MG
10 TABLET ORAL
Qty: 20 TABLET | Refills: 0 | Status: SHIPPED | OUTPATIENT
Start: 2023-02-03 | End: 2023-02-10

## 2023-02-03 NOTE — PROGRESS NOTES
Chief Complaint   Patient presents with    Flank Pain     Pt states she has pain on right side of lower back. This has cause chills and nausea    Medication Evaluation     Pt would like to discuss alternative to atorvastatin, she has had really bad leg cramps. She has been off over a year          Vitals:    02/03/23 1438   BP: (!) 160/89   Pulse: 90   Resp: 19   Temp: 98.6 °F (37 °C)   TempSrc: Temporal   SpO2: 97%   Weight: 146 lb (66.2 kg)   Height: 5' 2\" (1.575 m)   PainSc:   2   PainLoc: Back       Current Outpatient Medications on File Prior to Visit   Medication Sig Dispense Refill    felodipine (PLENDIL SR) 10 mg 24 hr tablet TAKE 1 TABLET BY MOUTH EVERY DAY 90 Tablet 1    buPROPion SR (WELLBUTRIN SR) 150 mg SR tablet TAKE 1 TABLET BY MOUTH TWICE A  Tablet 1    denosumab (PROLIA) 60 mg/mL injection 1 mL by SubCUTAneous route every 6 months. Indications: osteoporosis in postmenopausal woman at high risk for fracture 1 mL 1    aspirin 81 mg chewable tablet Take 81 mg by mouth daily. trimethoprim-sulfamethoxazole (BACTRIM DS, SEPTRA DS) 160-800 mg per tablet  (Patient not taking: Reported on 10/13/2022)      methenamine hippurate (HIPREX) 1 gram tablet TAKE 1 TABLET BY MOUTH TWICE A DAY AS DIRECTED WITH 500 MG OF VITAMIN C (Patient not taking: Reported on 10/13/2022)      methylPREDNISolone (MEDROL DOSEPACK) 4 mg tablet As directed (Patient not taking: Reported on 10/13/2022) 1 Dose Pack 0    atorvastatin (LIPITOR) 20 mg tablet TAKE 1 TABLET BY MOUTH EVERY DAY (Patient not taking: No sig reported) 90 Tablet 1    ibuprofen (MOTRIN) 800 mg tablet TAKE 1 TABLET BY MOUTH EVERY 6 HOURS AS NEEDED FOR PAIN       No current facility-administered medications on file prior to visit.        Health Maintenance Due   Topic    Colorectal Cancer Screening Combo     Shingles Vaccine (1 of 2)    Breast Cancer Screen Mammogram     DTaP/Tdap/Td series (1 - Tdap)    Pneumococcal 65+ years (3)    COVID-19 Vaccine (3 - Booster for Wesley Stephenson series)    Medicare Yearly Exam     Lipid Screen     Flu Vaccine (1)       1. \"Have you been to the ER, urgent care clinic since your last visit? Hospitalized since your last visit? \" Yes [facility]    2. \"Have you seen or consulted any other health care providers outside of the [address] [place] [address] since your last visit? \" No     3. For patients aged 39-70: Has the patient had a colonoscopy / FIT/ Cologuard? No      If the patient is female:    4. For patients aged 41-77: Has the patient had a mammogram within the past 2 years? No      5. For patients aged 21-65: Has the patient had a pap smear?  No

## 2023-02-03 NOTE — PROGRESS NOTES
French Graff is a 71 y.o. female  Chief Complaint   Patient presents with    Flank Pain     Pt states she has pain on right side of lower back. This has cause chills and nausea    Medication Evaluation     Pt would like to discuss alternative to atorvastatin, she has had really bad leg cramps. She has been off over a year     Visit Vitals  BP (!) 160/89 (BP 1 Location: Right upper arm, BP Patient Position: Sitting, BP Cuff Size: Adult)   Pulse 90   Temp 98.6 °F (37 °C) (Temporal)   Resp 19   Ht 5' 2\" (1.575 m)   Wt 146 lb (66.2 kg)   SpO2 97%   BMI 26.70 kg/m²          HPI  MsDiana Graff presents to clinic due to  Right flank that is localized around her right flan, pain moves around to front over time , pain is severe pain pain preventing her from sleep, sharp pain , stays until do something about , endorses nausea and chills, no vomiting. She report to have cholecystectomy in the past, she denies constipation, diarrhea, dysuria. Patient attributes Elevated blood pressure due to pain       . Past Medical History:   Diagnosis Date    Aortic calcification ([facility] [address] 1/23/2014    Degenerative disc disease 5/23/2012    neck    Family history of skin cancer     Hepatitis C antibody test positive 7/14/2015    Hypertension     Lipoma of back 6/15/2012    Mood swings     mood swings with menopause    Osteoporosis 12/7/2010    Raynaud's disease     Status post Mohs surgery for squamous cell carcinoma in situ of skin     Sunburn, blistering     Tanning bed exposure             ROS  Review of Systems   All other systems reviewed and are negative. EXAM  Physical Exam  Vitals reviewed. Cardiovascular:      Rate and Rhythm: Normal rate and regular rhythm. Pulses: Normal pulses. Heart sounds: Normal heart sounds. Abdominal:      General: There is no distension. Tenderness: There is no abdominal tenderness. There is no right CVA tenderness or left CVA tenderness.      Health Maintenance Due   Topic Date Due    Colorectal Cancer Screening Combo  Never done    Shingles Vaccine (1 of 2) Never done    Breast Cancer Screen Mammogram  Never done    DTaP/Tdap/Td series (1 - Tdap) 01/03/2008    Pneumococcal 65+ years (3) 11/01/2018    COVID-19 Vaccine (3 - Booster for [employer] series) 04/06/2021    Medicare Yearly Exam  11/25/2021    Flu Vaccine (1) 08/01/2022       ASSESSMENT/PLAN    Diagnoses and all orders for this visit:    1. Pain in pelvis  -     URINALYSIS W/ REFLEX CULTURE; Future  -     METABOLIC PANEL, COMPREHENSIVE; Future  -     XR PELV AP ONLY; Future  -     HYDROcodone-acetaminophen (NORCO) 5-325 mg per tablet; Take 1 Tablet by mouth every eight (8) hours as needed for Pain for up to 5 days. Max Daily Amount: 3 Tablets. 2. Flank pain  -     cyclobenzaprine (FLEXERIL) 10 mg tablet; Take 1 Tablet by mouth three (3) times daily as needed for Muscle Spasm(s) for up to 7 days.  -     HYDROcodone-acetaminophen (NORCO) 5-325 mg per tablet; Take 1 Tablet by mouth every eight (8) hours as needed for Pain for up to 5 days. Max Daily Amount: 3 Tablets. -     CT ABD PELV WO CONT; Future    3. Pure hypercholesterolemia  -     LIPID PANEL; Future    4. Hematuria, unspecified type  -     CT ABD PELV WO CONT; Future    Other orders  -     METABOLIC PANEL, COMPREHENSIVE  -     URINALYSIS W/ REFLEX CULTURE  -     MICROSCOPIC EXAMINATION  -     CVD REPORT  -     pravastatin (PRAVACHOL) 40 mg tablet; Take 1 Tablet by mouth nightly for 90 days.         Faith South MD

## 2023-02-04 LAB
ALBUMIN SERPL-MCNC: 4.9 G/DL (ref 3.7–4.7)
ALBUMIN/GLOB SERPL: 2.1 {RATIO} (ref 1.2–2.2)
ALP SERPL-CCNC: 69 IU/L (ref 44–121)
ALT SERPL-CCNC: 16 IU/L (ref 0–32)
APPEARANCE UR: CLEAR
AST SERPL-CCNC: 20 IU/L (ref 0–40)
BACTERIA #/AREA URNS HPF: ABNORMAL /[HPF]
BILIRUB SERPL-MCNC: 0.5 MG/DL (ref 0–1.2)
BILIRUB UR QL STRIP: NEGATIVE
BUN SERPL-MCNC: 15 MG/DL (ref 8–27)
BUN/CREAT SERPL: 17 (ref 12–28)
CALCIUM SERPL-MCNC: 9.5 MG/DL (ref 8.7–10.3)
CASTS URNS QL MICRO: ABNORMAL /LPF
CHLORIDE SERPL-SCNC: 105 MMOL/L (ref 96–106)
CHOLEST SERPL-MCNC: 277 MG/DL (ref 100–199)
CO2 SERPL-SCNC: 23 MMOL/L (ref 20–29)
COLOR UR: YELLOW
CREAT SERPL-MCNC: 0.88 MG/DL (ref 0.57–1)
EGFRCR SERPLBLD CKD-EPI 2021: 70 ML/MIN/1.73
EPI CELLS #/AREA URNS HPF: ABNORMAL /HPF (ref 0–10)
GLOBULIN SER CALC-MCNC: 2.3 G/DL (ref 1.5–4.5)
GLUCOSE SERPL-MCNC: 83 MG/DL (ref 70–99)
GLUCOSE UR QL STRIP: NEGATIVE
HDLC SERPL-MCNC: 72 MG/DL
HGB UR QL STRIP: ABNORMAL
IMP & REVIEW OF LAB RESULTS: NORMAL
KETONES UR QL STRIP: NEGATIVE
LABORATORY COMMENT REPORT: ABNORMAL
LDLC SERPL CALC-MCNC: 190 MG/DL (ref 0–99)
LEUKOCYTE ESTERASE UR QL STRIP: NEGATIVE
MICRO URNS: ABNORMAL
NITRITE UR QL STRIP: NEGATIVE
PH UR STRIP: 6.5 [PH] (ref 5–7.5)
POTASSIUM SERPL-SCNC: 4.6 MMOL/L (ref 3.5–5.2)
PROT SERPL-MCNC: 7.2 G/DL (ref 6–8.5)
PROT UR QL STRIP: NEGATIVE
RBC #/AREA URNS HPF: ABNORMAL /HPF (ref 0–2)
SODIUM SERPL-SCNC: 141 MMOL/L (ref 134–144)
SP GR UR STRIP: 1.02 (ref 1–1.03)
TRIGL SERPL-MCNC: 92 MG/DL (ref 0–149)
URINALYSIS REFLEX, 377202: ABNORMAL
UROBILINOGEN UR STRIP-MCNC: 0.2 MG/DL (ref 0.2–1)
VLDLC SERPL CALC-MCNC: 15 MG/DL (ref 5–40)
WBC #/AREA URNS HPF: ABNORMAL /HPF (ref 0–5)

## 2023-02-04 RX ORDER — PRAVASTATIN SODIUM 40 MG/1
40 TABLET ORAL
Qty: 90 TABLET | Refills: 0 | Status: SHIPPED | OUTPATIENT
Start: 2023-02-04 | End: 2023-05-05

## 2023-02-06 DIAGNOSIS — R10.9 FLANK PAIN: ICD-10-CM

## 2023-02-06 DIAGNOSIS — R10.2 PAIN IN PELVIS: ICD-10-CM

## 2023-02-06 RX ORDER — OXYCODONE AND ACETAMINOPHEN 5; 325 MG/1; MG/1
1 TABLET ORAL
Qty: 10 TABLET | Refills: 0 | Status: SHIPPED | OUTPATIENT
Start: 2023-02-06 | End: 2023-02-09

## 2023-02-06 RX ORDER — HYDROCODONE BITARTRATE AND ACETAMINOPHEN 5; 325 MG/1; MG/1
1 TABLET ORAL
Qty: 10 TABLET | Refills: 0 | Status: CANCELLED | OUTPATIENT
Start: 2023-02-06 | End: 2023-02-11

## 2023-02-06 NOTE — TELEPHONE ENCOUNTER
Response Requested:    \" Reason Requested: Product backordered/unavailable    Pharmacy comment: Product backordered/ unavailable: we do not have this medication.  Please send to another pharmacy or change to Oxycod/APAP 5-325mg \"

## 2023-03-20 DIAGNOSIS — M81.0 OSTEOPOROSIS, UNSPECIFIED OSTEOPOROSIS TYPE, UNSPECIFIED PATHOLOGICAL FRACTURE PRESENCE: Primary | ICD-10-CM

## 2023-04-27 ENCOUNTER — OFFICE VISIT (OUTPATIENT)
Dept: INTERNAL MEDICINE CLINIC | Age: 73
End: 2023-04-27

## 2023-04-27 VITALS
TEMPERATURE: 98.3 F | BODY MASS INDEX: 28.19 KG/M2 | HEART RATE: 89 BPM | DIASTOLIC BLOOD PRESSURE: 86 MMHG | HEIGHT: 62 IN | RESPIRATION RATE: 16 BRPM | OXYGEN SATURATION: 95 % | SYSTOLIC BLOOD PRESSURE: 134 MMHG | WEIGHT: 153.2 LBS

## 2023-04-27 DIAGNOSIS — I70.0 AORTIC CALCIFICATION (HCC): ICD-10-CM

## 2023-04-27 DIAGNOSIS — E78.00 PURE HYPERCHOLESTEROLEMIA: ICD-10-CM

## 2023-04-27 DIAGNOSIS — I10 ESSENTIAL HYPERTENSION: Primary | ICD-10-CM

## 2023-04-27 DIAGNOSIS — M81.0 AGE-RELATED OSTEOPOROSIS WITHOUT CURRENT PATHOLOGICAL FRACTURE: ICD-10-CM

## 2023-04-27 DIAGNOSIS — R10.9 FLANK PAIN: ICD-10-CM

## 2023-04-27 DIAGNOSIS — Z12.11 SCREEN FOR COLON CANCER: ICD-10-CM

## 2023-04-27 PROCEDURE — G9717 DOC PT DX DEP/BP F/U NT REQ: HCPCS | Performed by: INTERNAL MEDICINE

## 2023-04-27 PROCEDURE — 1090F PRES/ABSN URINE INCON ASSESS: CPT | Performed by: INTERNAL MEDICINE

## 2023-04-27 PROCEDURE — 99214 OFFICE O/P EST MOD 30 MIN: CPT | Performed by: INTERNAL MEDICINE

## 2023-04-27 PROCEDURE — 3017F COLORECTAL CA SCREEN DOC REV: CPT | Performed by: INTERNAL MEDICINE

## 2023-04-27 PROCEDURE — 1101F PT FALLS ASSESS-DOCD LE1/YR: CPT | Performed by: INTERNAL MEDICINE

## 2023-04-27 PROCEDURE — G8417 CALC BMI ABV UP PARAM F/U: HCPCS | Performed by: INTERNAL MEDICINE

## 2023-04-27 PROCEDURE — G8427 DOCREV CUR MEDS BY ELIG CLIN: HCPCS | Performed by: INTERNAL MEDICINE

## 2023-04-27 PROCEDURE — G8536 NO DOC ELDER MAL SCRN: HCPCS | Performed by: INTERNAL MEDICINE

## 2023-04-27 RX ORDER — PRAVASTATIN SODIUM 40 MG/1
TABLET ORAL
Qty: 90 TABLET | Refills: 0 | Status: SHIPPED | OUTPATIENT
Start: 2023-04-27

## 2023-04-27 NOTE — PROGRESS NOTES
Rm    Chief Complaint   Patient presents with    Flank Pain     Follow up        Visit Vitals  /86 (BP 1 Location: Right upper arm, BP Patient Position: Sitting, BP Cuff Size: Adult)   Pulse 89   Temp 98.3 °F (36.8 °C)   Resp 16   Ht 5' 2\" (1.575 m)   Wt 153 lb 3.2 oz (69.5 kg)   SpO2 95%   BMI 28.02 kg/m²        1. Have you been to the ER, urgent care clinic since your last visit? Hospitalized since your last visit? No    2. Have you seen or consulted any other health care providers outside of the [address] [place] [address] since your last visit? Include any pap smears or colon screening. No     Health Maintenance Due   Topic Date Due    Colorectal Cancer Screening Combo  Never done    Shingles Vaccine (1 of 2) Never done    Breast Cancer Screen Mammogram  Never done    DTaP/Tdap/Td series (1 - Tdap) 01/03/2008    Pneumococcal 65+ years (3) 11/01/2018    COVID-19 Vaccine (3 - Booster for [employer] series) 04/06/2021    Medicare Yearly Exam  11/25/2021    Depression Monitoring  05/02/2023        3 most recent PHQ Screens 4/27/2023   Little interest or pleasure in doing things Not at all   Feeling down, depressed, irritable, or hopeless Not at all   Total Score PHQ 2 0        Fall Risk Assessment, last 12 mths 4/27/2023   Able to walk? Yes   Fall in past 12 months? 0   Do you feel unsteady? 0   Are you worried about falling 0   Number of falls in past 12 months -   Fall with injury?  -       Learning Assessment 6/21/2021   PRIMARY LEARNER Patient   HIGHEST LEVEL OF EDUCATION - PRIMARY LEARNER  4 YEARS OF COLLEGE   BARRIERS PRIMARY LEARNER COGNITIVE   CO-LEARNER CAREGIVER No   PRIMARY LANGUAGE ENGLISH   LEARNER PREFERENCE PRIMARY DEMONSTRATION   ANSWERED BY PATIENT   RELATIONSHIP SELF

## 2023-04-28 NOTE — PROGRESS NOTES
Chief Complaint   Patient presents with    Flank Pain     Follow up     Was lifting at work pain better    Has htn and osteoporosis missed 4/15 dose prolia needs new order      Hx of compression fractures    Patient Active Problem List    Diagnosis    History of 2019 novel coronavirus disease (COVID-19)    Fall from ground level    Knee contusion    Multiple rib fractures    Fracture of fourth metacarpal bone of right hand    Rib pain on right side    Thoracic back pain    Vertebral compression fracture (HCC)    Tobacco abuse    Hepatitis C antibody test positive     Likely from blood transfusion        Aortic calcification (HCC)    Lipoma of back    Degenerative disc disease    HLD (hyperlipidemia)    Osteoporosis     Never took osteoporosis medication due to worry about side effects      Hypertension    Raynaud's disease    Depression     Current Outpatient Medications   Medication Sig    felodipine (PLENDIL SR) 10 mg 24 hr tablet TAKE 1 TABLET BY MOUTH EVERY DAY    buPROPion SR (WELLBUTRIN SR) 150 mg SR tablet TAKE 1 TABLET BY MOUTH TWICE A DAY    pravastatin (PRAVACHOL) 40 mg tablet TAKE 1 TABLET BY MOUTH NIGHTLY    denosumab (PROLIA) 60 mg/mL injection 1 mL by SubCUTAneous route every 6 months. Indications: osteoporosis in postmenopausal woman at high risk for fracture (Patient not taking: Reported on 4/27/2023)     No current facility-administered medications for this visit.      Review of Systems - General ROS: positive for  - fatigue  Psychological ROS: positive for - depression  Respiratory ROS: no cough, shortness of breath, or wheezing  Cardiovascular ROS: no chest pain or dyspnea on exertion  Gastrointestinal ROS: no abdominal pain, change in bowel habits, or black or bloody stools  Genito-Urinary ROS: no dysuria, trouble voiding, or hematuria  Musculoskeletal ROS: positive for - muscle pain        Vitals:    04/27/23 0829   BP: 134/86   Pulse: 89   Resp: 16   Temp: 98.3 °F (36.8 °C)   SpO2: 95%   Weight: 153 lb 3.2 oz (69.5 kg)   Height: 5' 2\" (1.575 m)     S1 and S2 normal, no murmurs, clicks, gallops or rubs. Regular rate and rhythm. Chest is clear; no wheezes or rales. No edema or JVD. Rom spine normal        Lab Results   Component Value Date/Time    Cholesterol, total 277 (H) 02/03/2023 03:30 PM    HDL Cholesterol 72 02/03/2023 03:30 PM    LDL, calculated 190 (H) 02/03/2023 03:30 PM    LDL, calculated 112.6 (H) 04/15/2021 03:44 PM    VLDL, calculated 15 02/03/2023 03:30 PM    VLDL, calculated 29.4 04/15/2021 03:44 PM    Triglyceride 92 02/03/2023 03:30 PM    CHOL/HDL Ratio 3.0 04/15/2021 03:44 PM     Now back on statin  Lab Results   Component Value Date/Time    GFR est AA >60 08/30/2022 08:29 AM    GFR est non-AA >60 08/30/2022 08:29 AM    Creatinine (POC) 0.71 10/13/2022 03:39 PM    Creatinine 0.88 02/03/2023 03:30 PM    BUN 15 02/03/2023 03:30 PM    Sodium (POC) 145 10/13/2022 03:39 PM    Sodium 141 02/03/2023 03:30 PM    Potassium 4.6 02/03/2023 03:30 PM    Potassium (POC) 4.0 10/13/2022 03:39 PM    Chloride (POC) 107 10/13/2022 03:39 PM    Chloride 105 02/03/2023 03:30 PM    CO2 23 02/03/2023 03:30 PM     1. Pure hypercholesterolemia  3 month lipid  - LIPID PANEL; Future    2. Aortic calcification (HCC)  On statin    3. Screen for colon cancer  due  - REFERRAL TO GASTROENTEROLOGY    4. Essential hypertension  controlled    5. Flank pain  Resolved    Osteoporosis    Get back on prolia ordered      Diagnoses and all orders for this visit:    1. Essential hypertension    2. Pure hypercholesterolemia  -     LIPID PANEL; Future    3. Aortic calcification (HCC)  Assessment & Plan:   well controlled, continue current medications      4. Screen for colon cancer  -     REFERRAL TO GASTROENTEROLOGY    5. Flank pain    6. Age-related osteoporosis without current pathological fracture  -     denosumab (PROLIA) 60 mg/mL injection; 1 mL by SubCUTAneous route once for 1 dose.

## 2023-07-20 RX ORDER — BUPROPION HYDROCHLORIDE 150 MG/1
TABLET, EXTENDED RELEASE ORAL
Qty: 180 TABLET | Refills: 1 | Status: SHIPPED | OUTPATIENT
Start: 2023-07-20

## 2023-07-20 RX ORDER — FELODIPINE 10 MG/1
TABLET, EXTENDED RELEASE ORAL
Qty: 90 TABLET | Refills: 1 | Status: SHIPPED | OUTPATIENT
Start: 2023-07-20

## 2023-11-07 RX ORDER — PRAVASTATIN SODIUM 40 MG
40 TABLET ORAL
Qty: 90 TABLET | Refills: 1 | Status: SHIPPED | OUTPATIENT
Start: 2023-11-07

## 2023-11-10 ENCOUNTER — TELEMEDICINE (OUTPATIENT)
Age: 73
End: 2023-11-10
Payer: MEDICARE

## 2023-11-10 ENCOUNTER — TELEPHONE (OUTPATIENT)
Age: 73
End: 2023-11-10

## 2023-11-10 DIAGNOSIS — R06.2 WHEEZING: ICD-10-CM

## 2023-11-10 DIAGNOSIS — J06.9 VIRAL UPPER RESPIRATORY TRACT INFECTION: Primary | ICD-10-CM

## 2023-11-10 DIAGNOSIS — R44.3 HALLUCINATIONS: ICD-10-CM

## 2023-11-10 DIAGNOSIS — R50.9 FEVER, UNSPECIFIED FEVER CAUSE: ICD-10-CM

## 2023-11-10 DIAGNOSIS — R05.1 ACUTE COUGH: ICD-10-CM

## 2023-11-10 PROCEDURE — 99213 OFFICE O/P EST LOW 20 MIN: CPT | Performed by: NURSE PRACTITIONER

## 2023-11-10 PROCEDURE — 1090F PRES/ABSN URINE INCON ASSESS: CPT | Performed by: NURSE PRACTITIONER

## 2023-11-10 PROCEDURE — G8427 DOCREV CUR MEDS BY ELIG CLIN: HCPCS | Performed by: NURSE PRACTITIONER

## 2023-11-10 PROCEDURE — G8399 PT W/DXA RESULTS DOCUMENT: HCPCS | Performed by: NURSE PRACTITIONER

## 2023-11-10 PROCEDURE — 3017F COLORECTAL CA SCREEN DOC REV: CPT | Performed by: NURSE PRACTITIONER

## 2023-11-10 PROCEDURE — 1123F ACP DISCUSS/DSCN MKR DOCD: CPT | Performed by: NURSE PRACTITIONER

## 2023-11-10 RX ORDER — ALBUTEROL SULFATE 90 UG/1
2 AEROSOL, METERED RESPIRATORY (INHALATION) 4 TIMES DAILY PRN
Qty: 18 G | Refills: 0 | Status: SHIPPED | OUTPATIENT
Start: 2023-11-10

## 2023-11-10 NOTE — TELEPHONE ENCOUNTER
Placed call to patient to patient to inquire as to where she was diagnosed with RSV and when. No answer. Left message on voice mail for return call.

## 2023-11-10 NOTE — PROGRESS NOTES
Karolina Ayers is a 71 y.o. female who was seen by synchronous (real-time) audio-video technology on 11/10/2023 for Cough (Patient starts then it goes into vomiting with bile. ) and Fever (Fever has been 100 degrees with hallucinations. )        Assessment & Plan:   Karolina was seen today for cough and fever. Diagnoses and all orders for this visit:    Viral upper respiratory tract infection    Fever, unspecified fever cause    Hallucinations    Acute cough    Wheezing    Other orders  -     albuterol sulfate HFA (VENTOLIN HFA) 108 (90 Base) MCG/ACT inhaler; Inhale 2 puffs into the lungs 4 times daily as needed for Wheezing         I spent at least 20 minutes on this visit with this established patient. Subjective:     Patient of Dr Ring Alleene calling    Patient reports the Nursing team at work said she has RSV. Symptoms started Friday. 1 week ago. Tmax 103. Wheezing. Oxygen 90%  Did a COVID test/ Negative. Unable to drive to seek care or get tested. Concerned about wheezing. Violent coughing. SOB. Wheezing very bad. Mucous was Green/ yellow / clear. Today complaints of cough, vomiting and fever  free today. Taking nothing for symptoms. Unsure ill exposures. 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 + exposure with  RSV  + smoker. Last 5-7 days ago    Prior to Admission medications    Medication Sig Start Date End Date Taking?  Authorizing Provider   pravastatin (PRAVACHOL) 40 MG tablet TAKE 1 TABLET BY MOUTH NIGHTLY 11/7/23  Yes Polly Moraes MD   buPROPion [facility] SR) 150 MG extended release tablet TAKE 1 TABLET BY MOUTH TWICE A DAY 7/20/23  Yes Polly Moraes MD   felodipine (PLENDIL) 10 MG extended release tablet TAKE 1 TABLET BY MOUTH EVERY DAY 7/20/23  Yes Polly Moraes MD   aspirin 81 MG chewable tablet Take 1 tablet by mouth daily   Yes Automatic Reconciliation, Ar     Patient Active Problem List    Diagnosis Date Noted    History of 2019

## 2023-11-10 NOTE — TELEPHONE ENCOUNTER
Pt returned call to the office and gave me a list of her symptoms    Started feeling symptoms on 11/03  Fever (not as high)  Hallucinations (not after Wed)  Wheezing   Coughing and throwing up  Phlegm      Pt was switched from an office visit to a virtual visit.

## 2023-11-16 RX ORDER — FELODIPINE 10 MG/1
TABLET, EXTENDED RELEASE ORAL
Qty: 90 TABLET | Refills: 0 | Status: SHIPPED | OUTPATIENT
Start: 2023-11-16

## 2023-11-16 RX ORDER — BUPROPION HYDROCHLORIDE 150 MG/1
TABLET, EXTENDED RELEASE ORAL
Qty: 180 TABLET | Refills: 0 | Status: SHIPPED | OUTPATIENT
Start: 2023-11-16

## 2024-02-03 ENCOUNTER — HOSPITAL ENCOUNTER (EMERGENCY)
Facility: HOSPITAL | Age: 74
Discharge: HOME | End: 2024-02-03
Attending: STUDENT IN AN ORGANIZED HEALTH CARE EDUCATION/TRAINING PROGRAM
Payer: MEDICARE

## 2024-02-03 VITALS
TEMPERATURE: 98.5 F | WEIGHT: 149.91 LBS | BODY MASS INDEX: 27.59 KG/M2 | OXYGEN SATURATION: 95 % | SYSTOLIC BLOOD PRESSURE: 124 MMHG | HEART RATE: 100 BPM | RESPIRATION RATE: 16 BRPM | HEIGHT: 62 IN | DIASTOLIC BLOOD PRESSURE: 90 MMHG

## 2024-02-03 DIAGNOSIS — R30.0 DYSURIA: Primary | ICD-10-CM

## 2024-02-03 DIAGNOSIS — M54.31 SCIATICA OF RIGHT SIDE: ICD-10-CM

## 2024-02-03 DIAGNOSIS — N30.00 ACUTE CYSTITIS WITHOUT HEMATURIA: ICD-10-CM

## 2024-02-03 LAB
APPEARANCE UR: CLEAR
BACTERIA URNS QL MICRO: ABNORMAL /HPF
BILIRUB UR QL: NEGATIVE
COLOR UR: ABNORMAL
EPITH CASTS URNS QL MICRO: ABNORMAL /LPF
GLUCOSE UR STRIP.AUTO-MCNC: 100 MG/DL
HGB UR QL STRIP: ABNORMAL
KETONES UR QL STRIP.AUTO: NEGATIVE MG/DL
LEUKOCYTE ESTERASE UR QL STRIP.AUTO: ABNORMAL
NITRITE UR QL STRIP.AUTO: POSITIVE
PH UR STRIP: 7 (ref 5–8)
PROT UR STRIP-MCNC: 30 MG/DL
RBC #/AREA URNS HPF: ABNORMAL /HPF (ref 0–5)
SP GR UR REFRACTOMETRY: 1.01 (ref 1–1.03)
URINE CULTURE IF INDICATED: ABNORMAL
UROBILINOGEN UR QL STRIP.AUTO: 2 EU/DL (ref 0.2–1)
WBC URNS QL MICRO: ABNORMAL /HPF (ref 0–4)

## 2024-02-03 PROCEDURE — 6360000002 HC RX W HCPCS: Performed by: STUDENT IN AN ORGANIZED HEALTH CARE EDUCATION/TRAINING PROGRAM

## 2024-02-03 PROCEDURE — 6370000000 HC RX 637 (ALT 250 FOR IP): Performed by: STUDENT IN AN ORGANIZED HEALTH CARE EDUCATION/TRAINING PROGRAM

## 2024-02-03 PROCEDURE — 96372 THER/PROPH/DIAG INJ SC/IM: CPT

## 2024-02-03 PROCEDURE — 99284 EMERGENCY DEPT VISIT MOD MDM: CPT

## 2024-02-03 PROCEDURE — 81001 URINALYSIS AUTO W/SCOPE: CPT

## 2024-02-03 RX ORDER — ACETAMINOPHEN 500 MG
1000 TABLET ORAL
Status: COMPLETED | OUTPATIENT
Start: 2024-02-03 | End: 2024-02-03

## 2024-02-03 RX ORDER — CYCLOBENZAPRINE HCL 5 MG
5 TABLET ORAL 2 TIMES DAILY PRN
Qty: 10 TABLET | Refills: 0 | Status: SHIPPED | OUTPATIENT
Start: 2024-02-03 | End: 2024-02-13

## 2024-02-03 RX ORDER — LIDOCAINE 50 MG/G
1 PATCH TOPICAL DAILY
Qty: 10 PATCH | Refills: 0 | Status: SHIPPED | OUTPATIENT
Start: 2024-02-03 | End: 2024-02-13

## 2024-02-03 RX ORDER — NITROFURANTOIN 25; 75 MG/1; MG/1
100 CAPSULE ORAL 2 TIMES DAILY
Qty: 14 CAPSULE | Refills: 0 | Status: SHIPPED | OUTPATIENT
Start: 2024-02-03 | End: 2024-02-10

## 2024-02-03 RX ORDER — KETOROLAC TROMETHAMINE 30 MG/ML
30 INJECTION, SOLUTION INTRAMUSCULAR; INTRAVENOUS ONCE
Status: COMPLETED | OUTPATIENT
Start: 2024-02-03 | End: 2024-02-03

## 2024-02-03 RX ORDER — LIDOCAINE 4 G/G
1 PATCH TOPICAL
Status: DISCONTINUED | OUTPATIENT
Start: 2024-02-03 | End: 2024-02-03 | Stop reason: HOSPADM

## 2024-02-03 RX ADMIN — ACETAMINOPHEN 1000 MG: 500 TABLET ORAL at 11:41

## 2024-02-03 RX ADMIN — KETOROLAC TROMETHAMINE 30 MG: 30 INJECTION, SOLUTION INTRAMUSCULAR; INTRAVENOUS at 11:42

## 2024-02-03 ASSESSMENT — ENCOUNTER SYMPTOMS
VOMITING: 0
BACK PAIN: 1
ABDOMINAL PAIN: 0
EYE DISCHARGE: 0
DIARRHEA: 0
EYE REDNESS: 0

## 2024-02-03 ASSESSMENT — PAIN DESCRIPTION - PAIN TYPE: TYPE: ACUTE PAIN

## 2024-02-03 ASSESSMENT — PAIN DESCRIPTION - DESCRIPTORS
DESCRIPTORS: ACHING
DESCRIPTORS: ACHING;SPASM

## 2024-02-03 ASSESSMENT — PAIN DESCRIPTION - LOCATION
LOCATION: BACK
LOCATION: BACK

## 2024-02-03 ASSESSMENT — PAIN DESCRIPTION - ORIENTATION
ORIENTATION: LOWER
ORIENTATION: RIGHT;LOWER

## 2024-02-03 ASSESSMENT — PAIN SCALES - GENERAL
PAINLEVEL_OUTOF10: 5
PAINLEVEL_OUTOF10: 5

## 2024-02-03 ASSESSMENT — PAIN - FUNCTIONAL ASSESSMENT
PAIN_FUNCTIONAL_ASSESSMENT: 0-10
PAIN_FUNCTIONAL_ASSESSMENT: ACTIVITIES ARE NOT PREVENTED

## 2024-02-03 NOTE — ED NOTES
Patient medicated, tolerated well.   Patient ambulatory to restroom to void, with steady gait. Samples sent to lab for testing.

## 2024-02-03 NOTE — ED PROVIDER NOTES
[facility] EMERGENCY DEPT  EMERGENCY DEPARTMENT ENCOUNTER      Pt Name: Karolina Brady  MRN: [id number]  Birthdate 1950  Date of evaluation: 2/3/2024  Provider: Mami Chavez DO    CHIEF COMPLAINT       Chief Complaint   Patient presents with    Dysuria    Back Pain         HISTORY OF PRESENT ILLNESS    HPI    Karolina Brady is a 73 y.o. female with a history listed below who presents to the emergency department for evaluation of dysuria and back pain.  Patient notes dysuria and urinary frequency for the last several days.  Reports she has been taking Azo without relief of symptoms.  Today she woke up with right-sided low back pain which radiates into her thigh.  Denies any preceding trauma or injury.  No fevers.  No flank pain.  No vomiting or diarrhea.    Nursing Notes were reviewed.    REVIEW OF SYSTEMS       Review of Systems   Constitutional:  Negative for chills and fever.   Eyes:  Negative for discharge and redness.   Gastrointestinal:  Negative for abdominal pain, diarrhea and vomiting.   Genitourinary:  Positive for dysuria and frequency. Negative for flank pain.   Musculoskeletal:  Positive for back pain.   Neurological:  Negative for speech difficulty.   Psychiatric/Behavioral:  Negative for agitation.            PAST MEDICAL HISTORY     Past Medical History:   Diagnosis Date    Aortic calcification (HCC) 2014    Degenerative disc disease 2012    neck    Family history of skin cancer     Hepatitis C antibody test positive 2015    Hypertension     Lipoma of back 6/15/2012    Mood swings     mood swings with menopause    Osteoporosis 2010    Raynaud's disease     Status post Mohs surgery for squamous cell carcinoma in situ of skin     Sunburn, blistering     Tanning bed exposure          SURGICAL HISTORY       Past Surgical History:   Procedure Laterality Date     SECTION  ,,1993     x 3    CHOLECYSTECTOMY  1974    NEUROLOGICAL SURGERY       synovial cyst t spine         CURRENT MEDICATIONS       Discharge Medication List as of 2/3/2024  1:02 PM        CONTINUE these medications which have NOT CHANGED    Details   buPROPion (WELLBUTRIN SR) 150 MG extended release tablet TAKE 1 TABLET BY MOUTH TWICE A DAY, Disp-180 tablet, R-0Normal      felodipine (PLENDIL) 10 MG extended release tablet TAKE 1 TABLET BY MOUTH EVERY DAY, Disp-90 tablet, R-0Normal      albuterol sulfate HFA (VENTOLIN HFA) 108 (90 Base) MCG/ACT inhaler Inhale 2 puffs into the lungs 4 times daily as needed for Wheezing, Disp-18 g, R-0Normal      pravastatin (PRAVACHOL) 40 MG tablet TAKE 1 TABLET BY MOUTH NIGHTLY, Disp-90 tablet, R-1Normal      aspirin 81 MG chewable tablet Take 1 tablet by mouth dailyHistorical Med             ALLERGIES     Codeine, Alendronate, and Cephalexin    FAMILY HISTORY       Family History   Problem Relation Age of Onset    Hypertension Father     Diabetes Father     Heart Disease Father 60            Parkinsonism Mother           SOCIAL HISTORY       Social History     Socioeconomic History    Marital status:      Spouse name: None    Number of children: None    Years of education: None    Highest education level: None   Tobacco Use    Smoking status: Former     Current packs/day: 0.00     Types: Cigarettes     Quit date: 2020     Years since quittin.0    Smokeless tobacco: Former     Quit date: 2014   Vaping Use    Vaping Use: Never used   Substance and Sexual Activity    Alcohol use: Yes     Alcohol/week: 3.0 standard drinks of alcohol    Drug use: No           PHYSICAL EXAM       ED Triage Vitals [24 1124]   BP Temp Temp Source Pulse Respirations SpO2 Height Weight - Scale   (!) 124/90 98.5 °F (36.9 °C) Oral 100 16 95 % 1.575 m (5' 2\") 68 kg (149 lb 14.6 oz)       Body mass index is 27.42 kg/m².    Physical Exam  Vitals and nursing note reviewed.   Constitutional:       General: She is in acute distress.      Appearance: She is

## 2024-02-03 NOTE — ED NOTES
Patient discharged to home, with understanding of discharge information and prescriptions sent to pharmacy. Patient ambulatory out of ED with steady gait.

## 2024-04-04 RX ORDER — FELODIPINE 10 MG/1
TABLET, EXTENDED RELEASE ORAL
Qty: 90 TABLET | Refills: 0 | Status: SHIPPED | OUTPATIENT
Start: 2024-04-04

## 2024-04-04 RX ORDER — ALBUTEROL SULFATE 90 UG/1
2 AEROSOL, METERED RESPIRATORY (INHALATION) 4 TIMES DAILY PRN
Qty: 18 EACH | Refills: 2 | Status: SHIPPED | OUTPATIENT
Start: 2024-04-04

## 2024-04-04 RX ORDER — BUPROPION HYDROCHLORIDE 150 MG/1
TABLET, EXTENDED RELEASE ORAL
Qty: 180 TABLET | Refills: 0 | Status: SHIPPED | OUTPATIENT
Start: 2024-04-04

## 2024-04-04 NOTE — TELEPHONE ENCOUNTER
Refill request received from [facility] for   Requested Prescriptions     Pending Prescriptions Disp Refills    albuterol sulfate HFA (PROVENTIL;VENTOLIN;PROAIR) 108 (90 Base) MCG/ACT inhaler [Pharmacy Med Name: ALBUTEROL HFA (VENTOLIN) INH] 18 each      Sig: INHALE 2 PUFFS INTO THE LUNGS 4 TIMES DAILY AS NEEDED FOR WHEEZING.     Last office visit: 11/10/2023   Next office visit: Visit date not found     Routed to Dr Henry Matos for review.

## 2024-04-04 NOTE — TELEPHONE ENCOUNTER
Refill request received from [facility] for   Requested Prescriptions     Pending Prescriptions Disp Refills    buPROPion (WELLBUTRIN SR) 150 MG extended release tablet [Pharmacy Med Name: BUPROPION HCL  MG TABLET] 180 tablet 0     Sig: TAKE 1 TABLET BY MOUTH TWICE A DAY    felodipine (PLENDIL) 10 MG extended release tablet [Pharmacy Med Name: FELODIPINE ER 10 MG TABLET] 90 tablet 0     Sig: TAKE 1 TABLET BY MOUTH EVERY DAY     Last office visit: 11/10/2023   Next office visit: 4/3/2024     Routed to Dr Henry Matos for review.

## 2024-05-13 RX ORDER — PRAVASTATIN SODIUM 40 MG
40 TABLET ORAL NIGHTLY
Qty: 90 TABLET | Refills: 0 | Status: SHIPPED | OUTPATIENT
Start: 2024-05-13

## 2024-05-30 ENCOUNTER — OFFICE VISIT (OUTPATIENT)
Age: 74
End: 2024-05-30
Payer: MEDICARE

## 2024-05-30 VITALS
OXYGEN SATURATION: 97 % | HEIGHT: 62 IN | DIASTOLIC BLOOD PRESSURE: 77 MMHG | WEIGHT: 145.2 LBS | TEMPERATURE: 98.7 F | BODY MASS INDEX: 26.72 KG/M2 | RESPIRATION RATE: 14 BRPM | SYSTOLIC BLOOD PRESSURE: 134 MMHG | HEART RATE: 82 BPM

## 2024-05-30 DIAGNOSIS — F33.1 MAJOR DEPRESSIVE DISORDER, RECURRENT, MODERATE (HCC): ICD-10-CM

## 2024-05-30 DIAGNOSIS — M54.50 CHRONIC BILATERAL LOW BACK PAIN WITHOUT SCIATICA: Primary | ICD-10-CM

## 2024-05-30 DIAGNOSIS — G89.29 CHRONIC BILATERAL LOW BACK PAIN WITHOUT SCIATICA: Primary | ICD-10-CM

## 2024-05-30 DIAGNOSIS — I70.0 ATHEROSCLEROSIS OF AORTA (HCC): ICD-10-CM

## 2024-05-30 DIAGNOSIS — F33.0 MAJOR DEPRESSIVE DISORDER, RECURRENT, MILD (HCC): ICD-10-CM

## 2024-05-30 PROBLEM — F33.9 MAJOR DEPRESSIVE DISORDER, RECURRENT, UNSPECIFIED (HCC): Status: ACTIVE | Noted: 2024-05-30

## 2024-05-30 LAB
BILIRUBIN, URINE, POC: NEGATIVE
BLOOD URINE, POC: NORMAL
GLUCOSE URINE, POC: NEGATIVE
KETONES, URINE, POC: NEGATIVE
LEUKOCYTE ESTERASE, URINE, POC: NORMAL
NITRITE, URINE, POC: NEGATIVE
PH, URINE, POC: 7 (ref 4.6–8)
PROTEIN,URINE, POC: NEGATIVE
SPECIFIC GRAVITY, URINE, POC: 1.02 (ref 1–1.03)
URINALYSIS CLARITY, POC: CLEAR
URINALYSIS COLOR, POC: YELLOW
UROBILINOGEN, POC: NORMAL

## 2024-05-30 PROCEDURE — G8427 DOCREV CUR MEDS BY ELIG CLIN: HCPCS | Performed by: INTERNAL MEDICINE

## 2024-05-30 PROCEDURE — 3078F DIAST BP <80 MM HG: CPT | Performed by: INTERNAL MEDICINE

## 2024-05-30 PROCEDURE — 99213 OFFICE O/P EST LOW 20 MIN: CPT | Performed by: INTERNAL MEDICINE

## 2024-05-30 PROCEDURE — G8399 PT W/DXA RESULTS DOCUMENT: HCPCS | Performed by: INTERNAL MEDICINE

## 2024-05-30 PROCEDURE — 3075F SYST BP GE 130 - 139MM HG: CPT | Performed by: INTERNAL MEDICINE

## 2024-05-30 PROCEDURE — 1123F ACP DISCUSS/DSCN MKR DOCD: CPT | Performed by: INTERNAL MEDICINE

## 2024-05-30 PROCEDURE — 1036F TOBACCO NON-USER: CPT | Performed by: INTERNAL MEDICINE

## 2024-05-30 PROCEDURE — 81003 URINALYSIS AUTO W/O SCOPE: CPT | Performed by: INTERNAL MEDICINE

## 2024-05-30 PROCEDURE — 3017F COLORECTAL CA SCREEN DOC REV: CPT | Performed by: INTERNAL MEDICINE

## 2024-05-30 PROCEDURE — PBSHW AMB POC URINALYSIS DIP STICK AUTO W/O MICRO: Performed by: INTERNAL MEDICINE

## 2024-05-30 PROCEDURE — 1090F PRES/ABSN URINE INCON ASSESS: CPT | Performed by: INTERNAL MEDICINE

## 2024-05-30 PROCEDURE — G8419 CALC BMI OUT NRM PARAM NOF/U: HCPCS | Performed by: INTERNAL MEDICINE

## 2024-05-30 SDOH — ECONOMIC STABILITY: HOUSING INSECURITY
IN THE LAST 12 MONTHS, WAS THERE A TIME WHEN YOU DID NOT HAVE A STEADY PLACE TO SLEEP OR SLEPT IN A SHELTER (INCLUDING NOW)?: PATIENT DECLINED

## 2024-05-30 SDOH — ECONOMIC STABILITY: FOOD INSECURITY: WITHIN THE PAST 12 MONTHS, THE FOOD YOU BOUGHT JUST DIDN'T LAST AND YOU DIDN'T HAVE MONEY TO GET MORE.: PATIENT DECLINED

## 2024-05-30 SDOH — ECONOMIC STABILITY: INCOME INSECURITY: HOW HARD IS IT FOR YOU TO PAY FOR THE VERY BASICS LIKE FOOD, HOUSING, MEDICAL CARE, AND HEATING?: PATIENT DECLINED

## 2024-05-30 SDOH — ECONOMIC STABILITY: FOOD INSECURITY: WITHIN THE PAST 12 MONTHS, YOU WORRIED THAT YOUR FOOD WOULD RUN OUT BEFORE YOU GOT MONEY TO BUY MORE.: PATIENT DECLINED

## 2024-05-30 ASSESSMENT — PATIENT HEALTH QUESTIONNAIRE - PHQ9
SUM OF ALL RESPONSES TO PHQ9 QUESTIONS 1 & 2: 0
9. THOUGHTS THAT YOU WOULD BE BETTER OFF DEAD, OR OF HURTING YOURSELF: NOT AT ALL
3. TROUBLE FALLING OR STAYING ASLEEP: NOT AT ALL
8. MOVING OR SPEAKING SO SLOWLY THAT OTHER PEOPLE COULD HAVE NOTICED. OR THE OPPOSITE, BEING SO FIGETY OR RESTLESS THAT YOU HAVE BEEN MOVING AROUND A LOT MORE THAN USUAL: NOT AT ALL
1. LITTLE INTEREST OR PLEASURE IN DOING THINGS: NOT AT ALL
7. TROUBLE CONCENTRATING ON THINGS, SUCH AS READING THE NEWSPAPER OR WATCHING TELEVISION: NOT AT ALL
5. POOR APPETITE OR OVEREATING: NOT AT ALL
2. FEELING DOWN, DEPRESSED OR HOPELESS: NOT AT ALL
SUM OF ALL RESPONSES TO PHQ QUESTIONS 1-9: 3
4. FEELING TIRED OR HAVING LITTLE ENERGY: NEARLY EVERY DAY
6. FEELING BAD ABOUT YOURSELF - OR THAT YOU ARE A FAILURE OR HAVE LET YOURSELF OR YOUR FAMILY DOWN: NOT AT ALL
SUM OF ALL RESPONSES TO PHQ QUESTIONS 1-9: 3

## 2024-05-30 NOTE — PROGRESS NOTES
Seen er twice  for lower back discomfort  one st f one JW when she was seen at [facility] which was about 2 months ago they gave her Macrobid for UTI without a culture and symptoms were not clearly urinary her symptoms were radicular low back pain intermittently left side sometimes right side she said that subsequent to that she has been at the [facility], [place], operated by [facility] I do not have reports but she says she had plain x-rays she was told there were no fractures and to go about her business she has tried muscle relaxers and that has not helped when she gets bad pain she takes 800 mg of ibuprofen which does help today she has no pain except for vague low-grade ache she does daily stretching.    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    This past weekend which would be 4 days ago she had a lot of pain and it was both left side and right side sometimes radiating into the buttock the pain being lumbar.  She denies numbness or tingling in her feet she occasionally gets radiating pain down she does not miss work she usually goes to work after taking a bunch of Advil.  There is been no change in her appetite but is been no change in her bowels she otherwise is stable still smokes 5 cigarettes a day on average.    Review of Systems  Arthralgia positive  Raynauds  No rocha or cough  \    Outside xrays not available    Last film by us showed compressed vert in 2019    MRI Result (most recent):  MRI LUMBAR SPINE WO CONTRAST 09/04/2019    Narrative  EXAM:  MRI LUMB SPINE WO CONT    INDICATION:   compression    COMPARISON: MRI lumbar spine 5/15/2017, lumbar spine radiograph 9/3/2019.    TECHNIQUE:

## 2024-06-11 ENCOUNTER — OFFICE VISIT (OUTPATIENT)
Age: 74
End: 2024-06-11
Payer: MEDICARE

## 2024-06-11 VITALS
SYSTOLIC BLOOD PRESSURE: 147 MMHG | RESPIRATION RATE: 18 BRPM | OXYGEN SATURATION: 94 % | TEMPERATURE: 98.9 F | HEIGHT: 62 IN | DIASTOLIC BLOOD PRESSURE: 82 MMHG | HEART RATE: 81 BPM | WEIGHT: 146 LBS | BODY MASS INDEX: 26.87 KG/M2

## 2024-06-11 DIAGNOSIS — Z12.39 BREAST CANCER SCREENING OTHER THAN MAMMOGRAM: ICD-10-CM

## 2024-06-11 DIAGNOSIS — M81.6 LOCALIZED OSTEOPOROSIS WITHOUT CURRENT PATHOLOGICAL FRACTURE: ICD-10-CM

## 2024-06-11 DIAGNOSIS — R76.8 HEPATITIS C ANTIBODY TEST POSITIVE: ICD-10-CM

## 2024-06-11 DIAGNOSIS — Z12.12 SCREENING FOR COLORECTAL CANCER: ICD-10-CM

## 2024-06-11 DIAGNOSIS — Z12.31 ENCOUNTER FOR SCREENING MAMMOGRAM FOR MALIGNANT NEOPLASM OF BREAST: ICD-10-CM

## 2024-06-11 DIAGNOSIS — Z00.00 MEDICARE ANNUAL WELLNESS VISIT, SUBSEQUENT: Primary | ICD-10-CM

## 2024-06-11 DIAGNOSIS — I10 ESSENTIAL (PRIMARY) HYPERTENSION: ICD-10-CM

## 2024-06-11 DIAGNOSIS — E78.00 PURE HYPERCHOLESTEROLEMIA, UNSPECIFIED: ICD-10-CM

## 2024-06-11 DIAGNOSIS — I70.0 ATHEROSCLEROSIS OF AORTA (HCC): ICD-10-CM

## 2024-06-11 DIAGNOSIS — Z12.11 SCREENING FOR COLORECTAL CANCER: ICD-10-CM

## 2024-06-11 DIAGNOSIS — Z78.0 MENOPAUSE: ICD-10-CM

## 2024-06-11 PROBLEM — S62.304A FRACTURE OF FOURTH METACARPAL BONE OF RIGHT HAND: Status: RESOLVED | Noted: 2020-05-11 | Resolved: 2024-06-11

## 2024-06-11 PROBLEM — S80.00XA KNEE CONTUSION: Status: RESOLVED | Noted: 2020-05-11 | Resolved: 2024-06-11
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
## 2024-06-11 PROBLEM — Z86.16 HISTORY OF 2019 NOVEL CORONAVIRUS DISEASE (COVID-19): Status: RESOLVED | Noted: 2020-12-14 | Resolved: 2024-06-11

## 2024-06-11 PROBLEM — F33.1 MAJOR DEPRESSIVE DISORDER, RECURRENT, MODERATE (HCC): Status: RESOLVED | Noted: 2024-05-30 | Resolved: 2024-06-11

## 2024-06-11 LAB
ALBUMIN SERPL-MCNC: 4.1 G/DL (ref 3.5–5)
ALBUMIN/GLOB SERPL: 1.3 (ref 1.1–2.2)
ALP SERPL-CCNC: 132 U/L (ref 45–117)
ALT SERPL-CCNC: 19 U/L (ref 12–78)
ANION GAP SERPL CALC-SCNC: 3 MMOL/L (ref 5–15)
AST SERPL-CCNC: 17 U/L (ref 15–37)
BASOPHILS # BLD: 0.1 K/UL (ref 0–0.1)
BASOPHILS NFR BLD: 1 % (ref 0–1)
BILIRUB SERPL-MCNC: 0.6 MG/DL (ref 0.2–1)
BUN SERPL-MCNC: 23 MG/DL (ref 6–20)
BUN/CREAT SERPL: 26 (ref 12–20)
CALCIUM SERPL-MCNC: 9.6 MG/DL (ref 8.5–10.1)
CHLORIDE SERPL-SCNC: 111 MMOL/L (ref 97–108)
CHOLEST SERPL-MCNC: 208 MG/DL
CO2 SERPL-SCNC: 27 MMOL/L (ref 21–32)
CREAT SERPL-MCNC: 0.89 MG/DL (ref 0.55–1.02)
DIFFERENTIAL METHOD BLD: NORMAL
EOSINOPHIL # BLD: 0.1 K/UL (ref 0–0.4)
EOSINOPHIL NFR BLD: 1 % (ref 0–7)
ERYTHROCYTE [DISTWIDTH] IN BLOOD BY AUTOMATED COUNT: 13 % (ref 11.5–14.5)
GLOBULIN SER CALC-MCNC: 3.1 G/DL (ref 2–4)
GLUCOSE SERPL-MCNC: 77 MG/DL (ref 65–100)
HCT VFR BLD AUTO: 41.4 % (ref 35–47)
HDLC SERPL-MCNC: 81 MG/DL
HDLC SERPL: 2.6 (ref 0–5)
HGB BLD-MCNC: 13.3 G/DL (ref 11.5–16)
IMM GRANULOCYTES # BLD AUTO: 0 K/UL (ref 0–0.04)
IMM GRANULOCYTES NFR BLD AUTO: 0 % (ref 0–0.5)
LDLC SERPL CALC-MCNC: 111.6 MG/DL (ref 0–100)
LYMPHOCYTES # BLD: 2.7 K/UL (ref 0.8–3.5)
LYMPHOCYTES NFR BLD: 35 % (ref 12–49)
MCH RBC QN AUTO: 28.9 PG (ref 26–34)
MCHC RBC AUTO-ENTMCNC: 32.1 G/DL (ref 30–36.5)
MCV RBC AUTO: 90 FL (ref 80–99)
MONOCYTES # BLD: 0.7 K/UL (ref 0–1)
MONOCYTES NFR BLD: 10 % (ref 5–13)
NEUTS SEG # BLD: 4 K/UL (ref 1.8–8)
NEUTS SEG NFR BLD: 53 % (ref 32–75)
NRBC # BLD: 0 K/UL (ref 0–0.01)
NRBC BLD-RTO: 0 PER 100 WBC
PLATELET # BLD AUTO: 359 K/UL (ref 150–400)
PMV BLD AUTO: 9.6 FL (ref 8.9–12.9)
POTASSIUM SERPL-SCNC: 4.1 MMOL/L (ref 3.5–5.1)
PROT SERPL-MCNC: 7.2 G/DL (ref 6.4–8.2)
RBC # BLD AUTO: 4.6 M/UL (ref 3.8–5.2)
SODIUM SERPL-SCNC: 141 MMOL/L (ref 136–145)
TRIGL SERPL-MCNC: 77 MG/DL
VLDLC SERPL CALC-MCNC: 15.4 MG/DL
WBC # BLD AUTO: 7.6 K/UL (ref 3.6–11)

## 2024-06-11 PROCEDURE — 99214 OFFICE O/P EST MOD 30 MIN: CPT | Performed by: INTERNAL MEDICINE

## 2024-06-11 PROCEDURE — G8399 PT W/DXA RESULTS DOCUMENT: HCPCS | Performed by: INTERNAL MEDICINE

## 2024-06-11 PROCEDURE — G8419 CALC BMI OUT NRM PARAM NOF/U: HCPCS | Performed by: INTERNAL MEDICINE

## 2024-06-11 PROCEDURE — 3079F DIAST BP 80-89 MM HG: CPT | Performed by: INTERNAL MEDICINE

## 2024-06-11 PROCEDURE — 3077F SYST BP >= 140 MM HG: CPT | Performed by: INTERNAL MEDICINE

## 2024-06-11 PROCEDURE — 3017F COLORECTAL CA SCREEN DOC REV: CPT | Performed by: INTERNAL MEDICINE

## 2024-06-11 PROCEDURE — 1123F ACP DISCUSS/DSCN MKR DOCD: CPT | Performed by: INTERNAL MEDICINE

## 2024-06-11 PROCEDURE — 1090F PRES/ABSN URINE INCON ASSESS: CPT | Performed by: INTERNAL MEDICINE

## 2024-06-11 PROCEDURE — 1036F TOBACCO NON-USER: CPT | Performed by: INTERNAL MEDICINE

## 2024-06-11 PROCEDURE — G0439 PPPS, SUBSEQ VISIT: HCPCS | Performed by: INTERNAL MEDICINE

## 2024-06-11 PROCEDURE — G8427 DOCREV CUR MEDS BY ELIG CLIN: HCPCS | Performed by: INTERNAL MEDICINE

## 2024-06-11 RX ORDER — LOSARTAN POTASSIUM 50 MG/1
50 TABLET ORAL DAILY
Qty: 90 TABLET | Refills: 1 | Status: SHIPPED | OUTPATIENT
Start: 2024-06-11

## 2024-06-11 ASSESSMENT — PATIENT HEALTH QUESTIONNAIRE - PHQ9
4. FEELING TIRED OR HAVING LITTLE ENERGY: NOT AT ALL
9. THOUGHTS THAT YOU WOULD BE BETTER OFF DEAD, OR OF HURTING YOURSELF: NOT AT ALL
3. TROUBLE FALLING OR STAYING ASLEEP: NOT AT ALL
SUM OF ALL RESPONSES TO PHQ QUESTIONS 1-9: 0
6. FEELING BAD ABOUT YOURSELF - OR THAT YOU ARE A FAILURE OR HAVE LET YOURSELF OR YOUR FAMILY DOWN: NOT AT ALL
SUM OF ALL RESPONSES TO PHQ QUESTIONS 1-9: 0
10. IF YOU CHECKED OFF ANY PROBLEMS, HOW DIFFICULT HAVE THESE PROBLEMS MADE IT FOR YOU TO DO YOUR WORK, TAKE CARE OF THINGS AT HOME, OR GET ALONG WITH OTHER PEOPLE: NOT DIFFICULT AT ALL
2. FEELING DOWN, DEPRESSED OR HOPELESS: NOT AT ALL
SUM OF ALL RESPONSES TO PHQ9 QUESTIONS 1 & 2: 0
7. TROUBLE CONCENTRATING ON THINGS, SUCH AS READING THE NEWSPAPER OR WATCHING TELEVISION: NOT AT ALL
1. LITTLE INTEREST OR PLEASURE IN DOING THINGS: NOT AT ALL
8. MOVING OR SPEAKING SO SLOWLY THAT OTHER PEOPLE COULD HAVE NOTICED. OR THE OPPOSITE, BEING SO FIGETY OR RESTLESS THAT YOU HAVE BEEN MOVING AROUND A LOT MORE THAN USUAL: NOT AT ALL
SUM OF ALL RESPONSES TO PHQ QUESTIONS 1-9: 0
SUM OF ALL RESPONSES TO PHQ QUESTIONS 1-9: 0

## 2024-06-11 ASSESSMENT — LIFESTYLE VARIABLES
HOW OFTEN DO YOU HAVE A DRINK CONTAINING ALCOHOL: 2-3 TIMES A WEEK
HOW MANY STANDARD DRINKS CONTAINING ALCOHOL DO YOU HAVE ON A TYPICAL DAY: 1 OR 2

## 2024-06-11 NOTE — PROGRESS NOTES
Medicare Annual Wellness Visit    Karolina L Einstein Caitlyn is here for Medicare AWV (Left eye issues- sated last week- )    Assessment & Plan   Medicare annual wellness visit, subsequent  Atherosclerosis of aorta (HCC)  -     [place] OFFICE OUTPATIENT VISIT 25 MINUTES [70822]  Pure hypercholesterolemia, unspecified  -     [place] OFFICE OUTPATIENT VISIT 25 MINUTES [65330]  Essential (primary) hypertension  -     [place] OFFICE OUTPATIENT VISIT 25 MINUTES [90440]  Screening for colorectal cancer  -     FIT-DNA (Cologuard)  Recommendations for Preventive Services Due: see orders and patient instructions/AVS.  Recommended screening schedule for the next 5-10 years is provided to the patient in written form: see Patient Instructions/AVS.     Return in 6 months (on 12/11/2024).     Subjective   The following acute and/or chronic problems were also addressed today:  Patient Active Problem List   Diagnosis    Vertebral compression fracture (HCC)    Thoracic back pain    Fall from ground level    Raynaud's disease    Aortic calcification (HCC)    History of 2019 novel coronavirus disease (COVID-19)    Multiple rib fractures    Rib pain on right side    Fracture of fourth metacarpal bone of right hand    Knee contusion    Hypertension    Lipoma of back    HLD (hyperlipidemia)    Hepatitis C antibody test positive    Tobacco abuse    Depression    Osteoporosis    Major depressive disorder, recurrent, mild    Major depressive disorder, recurrent, moderate    Major depressive disorder, recurrent, unspecified   73-year-old woman has a history of hypertension dyslipidemia and some tobacco abuse she has not been able to quit smoking.  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  She has a degree in art and she uses her art to some extent.  Blood pressure control is not been ideal

## 2024-06-11 NOTE — PROGRESS NOTES
I have reviewed all needed documentation in preparation for visit. Verified patient by name and date of birth  Chief Complaint   Patient presents with    Medicare AWV     Left eye issues- sated last week-        Vitals:    06/11/24 1430   BP: (!) 147/82   Site: Right Upper Arm   Position: Sitting   Cuff Size: Medium Adult   Pulse: 81   Resp: 18   Temp: 98.9 °F (37.2 °C)   TempSrc: Temporal   SpO2: 94%   Weight: 66.2 kg (146 lb)   Height: 1.575 m (5' 2\")       Health Maintenance Due   Topic Date Due    Breast cancer screen  Never done    Colorectal Cancer Screen  Never done    Shingles vaccine (1 of 2) Never done    DTaP/Tdap/Td vaccine (1 - Tdap) 01/03/2008    Respiratory Syncytial Virus (RSV) Pregnant or age 60 yrs+ (1 - 1-dose 60+ series) Never done    Pneumococcal 65+ years Vaccine (2 of 2 - PPSV23 or PCV20) 11/01/2018    COVID-19 Vaccine (3 - 2023-24 season) 09/01/2023    Annual Wellness Visit (Medicare)  11/27/2023    Lipids  02/03/2024     \"Have you been to the ER, urgent care clinic since your last visit?  Hospitalized since your last visit?\"    YES    “Have you seen or consulted any other health care providers outside of [facility] since your last visit?”    NO    Have you had a mammogram?”   NO    No breast cancer screening on file         “Have you had a colorectal cancer screening such as a colonoscopy/FIT/Cologuard?    NO    No colonoscopy on file  No cologuard on file  No FIT/FOBT on file   No flexible sigmoidoscopy on file         Click Here for Release of Records Request         Gita pavon CMA

## 2024-06-11 NOTE — PATIENT INSTRUCTIONS

## 2024-06-12 RX ORDER — PRAVASTATIN SODIUM 80 MG/1
80 TABLET ORAL NIGHTLY
Qty: 90 TABLET | Refills: 3 | Status: SHIPPED | OUTPATIENT
Start: 2024-06-12

## 2024-07-03 ENCOUNTER — TELEPHONE (OUTPATIENT)
Age: 74
End: 2024-07-03

## 2024-07-03 NOTE — TELEPHONE ENCOUNTER
Left message for patient to schedule mammogram ordered by PCP  Requested patient to return call to panel manager at [phone] to schedule mammogram or notify that mammogram   has been completed at an outside facility.

## 2024-07-30 RX ORDER — BUPROPION HYDROCHLORIDE 150 MG/1
TABLET, EXTENDED RELEASE ORAL
Qty: 180 TABLET | Refills: 1 | Status: SHIPPED | OUTPATIENT
Start: 2024-07-30

## 2024-07-30 RX ORDER — FELODIPINE 10 MG/1
TABLET, EXTENDED RELEASE ORAL
Qty: 90 TABLET | Refills: 1 | Status: SHIPPED | OUTPATIENT
Start: 2024-07-30

## 2024-08-14 RX ORDER — PRAVASTATIN SODIUM 40 MG
40 TABLET ORAL NIGHTLY
Qty: 90 TABLET | Refills: 0 | OUTPATIENT
Start: 2024-08-14

## 2024-11-11 ENCOUNTER — TELEPHONE (OUTPATIENT)
Age: 74
End: 2024-11-11

## 2024-11-11 NOTE — TELEPHONE ENCOUNTER
Patient transferred to [facility] from the [facility]. States she popped a rib recently. She states after this she started experiencing nausea and abdominal cramping on the left side. She also mentions feeling weak.  She states she prefers to see Dr. Matos, but understands his limited availability. I let the patient know that Dr. Matos's next available would be 11/14 and offered to schedule her to be seen by another provider sooner. She decided to schedule with Dr. Matos for 11/14  at 2:30 pm and states she will go to the ER if things get worse before then.

## 2024-11-14 ENCOUNTER — OFFICE VISIT (OUTPATIENT)
Age: 74
End: 2024-11-14
Payer: MEDICARE

## 2024-11-14 VITALS
BODY MASS INDEX: 26.87 KG/M2 | DIASTOLIC BLOOD PRESSURE: 75 MMHG | OXYGEN SATURATION: 95 % | HEART RATE: 76 BPM | WEIGHT: 146 LBS | TEMPERATURE: 97.6 F | HEIGHT: 62 IN | SYSTOLIC BLOOD PRESSURE: 129 MMHG | RESPIRATION RATE: 17 BRPM

## 2024-11-14 DIAGNOSIS — R10.32 LLQ ABDOMINAL PAIN: ICD-10-CM

## 2024-11-14 DIAGNOSIS — K29.30 SUPERFICIAL GASTRITIS WITHOUT HEMORRHAGE, UNSPECIFIED CHRONICITY: Primary | ICD-10-CM

## 2024-11-14 PROCEDURE — 99213 OFFICE O/P EST LOW 20 MIN: CPT | Performed by: INTERNAL MEDICINE

## 2024-11-14 NOTE — PROGRESS NOTES
I have reviewed all needed documentation in preparation for visit. Verified patient by name and date of birth  Chief Complaint   Patient presents with    Nausea & Vomiting     Abdomen cramping        There were no vitals filed for this visit.    Health Maintenance Due   Topic Date Due    Breast cancer screen  Never done    Colorectal Cancer Screen  Never done    Shingles vaccine (1 of 2) Never done    DTaP/Tdap/Td vaccine (1 - Tdap) 01/03/2008    Respiratory Syncytial Virus (RSV) Pregnant or age 60 yrs+ (1 - 1-dose 60+ series) Never done    Pneumococcal 65+ years Vaccine (2 of 2 - PPSV23 or PCV20) 12/27/2017    Flu vaccine (1) 08/01/2024    COVID-19 Vaccine (3 - 2023-24 season) 09/01/2024     \"Have you been to the ER, urgent care clinic since your last visit?  Hospitalized since your last visit?\"    NO    “Have you seen or consulted any other health care providers outside of  since your last visit?”    NO    Have you had a mammogram?”   NO    No breast cancer screening on file         “Have you had a colorectal cancer screening such as a colonoscopy/FIT/Cologuard?    NO    No colonoscopy on file  No cologuard on file  No FIT/FOBT on file   No flexible sigmoidoscopy on file         Click Here for Release of Records Request         Desirae CABALLERO

## 2024-11-18 NOTE — PROGRESS NOTES
Chief Complaint   Patient presents with    Nausea & Vomiting     Abdomen cramping      This is a 73-year-old white female with a history of hypertension has a prior history of diverticulitis she noted that she has had left lower quadrant abdominal pain for a while and it was worse last week but is still there today bowel movements she says are moving but they are little narrow she has had nausea she was taking a lot of Motrin for pain in general generalized musculoskeletal pain and the belly pain she stopped the Motrin but now she is having nausea denies heartburn or indigestion denies hematemesis denies melena.    Patient Active Problem List    Diagnosis Date Noted    Major depressive disorder, recurrent, mild 05/30/2024    Major depressive disorder, recurrent, unspecified 05/30/2024    Vertebral compression fracture (HCC) 05/11/2020    Thoracic back pain 05/11/2020    Multiple rib fractures 05/11/2020    Rib pain on right side 05/11/2020    Tobacco abuse 04/13/2017    Hepatitis C antibody test positive 07/14/2015    Aortic calcification (HCC) 01/23/2014    Lipoma of back 06/15/2012    HLD (hyperlipidemia) 04/04/2012    Osteoporosis 12/07/2010    Raynaud's disease     Hypertension     Depression      Gastrointestinal ROS: positive for - abdominal pain, appetite loss, change in bowel habits, and nausea/vomiting  negative for - appetite loss, diarrhea, gas/bloating, heartburn, hematemesis, melena, or stool incontinence  Abdominal exam: tenderness noted llq  no rebound tenderness noted.        Karolina was seen today for nausea & vomiting.    Diagnoses and all orders for this visit:    Superficial gastritis without hemorrhage, unspecified chronicity  -     omeprazole (PRILOSEC) 20 MG delayed release capsule; Take 1 capsule by mouth every morning (before breakfast)    LLQ abdominal pain  -     CT ABDOMEN PELVIS WO CONTRAST Additional Contrast? Radiologist Recommendation; Future      We are going to assume that her

## 2024-12-01 ENCOUNTER — OFFICE VISIT (OUTPATIENT)
Age: 74
End: 2024-12-01

## 2024-12-01 VITALS
HEIGHT: 62 IN | BODY MASS INDEX: 26.39 KG/M2 | DIASTOLIC BLOOD PRESSURE: 102 MMHG | WEIGHT: 143.4 LBS | HEART RATE: 91 BPM | TEMPERATURE: 98 F | RESPIRATION RATE: 16 BRPM | OXYGEN SATURATION: 95 % | SYSTOLIC BLOOD PRESSURE: 168 MMHG

## 2024-12-01 DIAGNOSIS — B02.9 HERPES ZOSTER WITHOUT COMPLICATION: Primary | ICD-10-CM

## 2024-12-01 RX ORDER — VALACYCLOVIR HYDROCHLORIDE 1 G/1
1000 TABLET, FILM COATED ORAL 3 TIMES DAILY
Qty: 21 TABLET | Refills: 0 | Status: SHIPPED | OUTPATIENT
Start: 2024-12-01 | End: 2024-12-08

## 2024-12-01 NOTE — PATIENT INSTRUCTIONS
Thank you for visiting [facility] today.    -Calamine lotion 4-5 times daily as needed  -Zinc oxide cream  -Ibuprofen/Tylenol    If you have shingles,  you may wonder when it is safe to return to work.  The answer depends on where you work and where your rash is located:  If the rash is on your face, do not return to work until the area has crusted over, which generally takes 7 to 10 days  If the rash is in an area that you can cover (eg, with gauze bandage or clothing), you may return to work once you feel well  If you work in a health care facility (eg, hospital, medical office, nursing home), consult your health care provider about when it is safe to return to work  Avoid exposure to pregnant women    Follow up with your PCP for continuity of care and assessment of improvement.      Go to the ER if:  The rash is on your face or nose  You have facial pain, pain around your eye area, or loss of feeling on one side of your face  You have difficulty seeing  You have ear pain or have ringing in your ear  You have a loss of taste  Your condition gets worse

## 2024-12-02 ENCOUNTER — TELEMEDICINE (OUTPATIENT)
Age: 74
End: 2024-12-02

## 2024-12-02 DIAGNOSIS — B02.8 HERPES ZOSTER COMPLICATED: Primary | ICD-10-CM

## 2024-12-02 RX ORDER — GABAPENTIN 300 MG/1
300 CAPSULE ORAL EVERY 6 HOURS PRN
Qty: 30 CAPSULE | Refills: 0 | Status: SHIPPED | OUTPATIENT
Start: 2024-12-02 | End: 2025-01-01

## 2024-12-02 ASSESSMENT — ENCOUNTER SYMPTOMS
CONSTIPATION: 0
DIARRHEA: 0
EYE PAIN: 1
NAUSEA: 0
WHEEZING: 0
SHORTNESS OF BREATH: 0
SINUS PAIN: 0
COUGH: 0
ABDOMINAL PAIN: 0
VOMITING: 0
EYE REDNESS: 0

## 2024-12-02 NOTE — PROGRESS NOTES
Karolina Brady is a 73 y.o. female who was seen by synchronous (real-time) audio-video technology on 12/2/2024    Consent: Karolina Brady, who was seen by synchronous (real-time) audio-video technology, and/or her healthcare decision maker, is aware that this patient-initiated, Telehealth encounter on 12/2/2024 is a billable service, with coverage as determined by her insurance carrier. She is aware that she may receive a bill and has provided verbal consent to proceed: YES  Subjective:   Karolina Brady is a 73 y.o. female who was seen for Herpes Zoster (Sxs Spreading to her Eye ,and pain on Rt side of face and ear and On mouth pt is taking Ibuprofen every 2 hrs ,)      Mrs. King is a 73 year old woman who presents for shingles of V2 of CN 5 that is progressing and starting to impact V1 distribution of her right eye. She was seen by urgent care yesterday and is on valacyclovir however was told if the outbreak starts to affect her eye to let us know She has been taking the antiviral and tylenol. She has been drinking water but finds it painful to chew food so has not eaten much.       Health Maintenance Due   Topic Date Due    Breast cancer screen  Never done    Colorectal Cancer Screen  Never done    Shingles vaccine (1 of 2) Never done    DTaP/Tdap/Td vaccine (1 - Tdap) 01/03/2008    Pneumococcal 65+ years Vaccine (2 of 2 - PPSV23 or PCV20) 12/27/2017    Flu vaccine (1) 08/01/2024    COVID-19 Vaccine (3 - 2023-24 season) 09/01/2024       Review of Systems   Constitutional:  Negative for chills and fever.   HENT:  Positive for ear pain. Negative for sinus pain.         Facial pain   Eyes:  Positive for pain. Negative for redness.   Respiratory:  Negative for cough, shortness of breath and wheezing.    Cardiovascular:  Negative for chest pain.   Gastrointestinal:  Negative for abdominal pain, constipation, diarrhea, nausea and vomiting.   Endocrine: Negative for polyuria.

## 2024-12-02 NOTE — PROGRESS NOTES
I have reviewed all needed documentation in preparation for visit. Verified patient by name and date of birth  Chief Complaint   Patient presents with    Herpes Zoster     Sxs Spreading to her Eye ,and pain on Rt side of face and ear and On mouth pt is taking Ibuprofen every 2 hrs ,       There were no vitals filed for this visit.    Health Maintenance Due   Topic Date Due    Breast cancer screen  Never done    Colorectal Cancer Screen  Never done    Shingles vaccine (1 of 2) Never done    DTaP/Tdap/Td vaccine (1 - Tdap) 01/03/2008    Pneumococcal 65+ years Vaccine (2 of 2 - PPSV23 or PCV20) 12/27/2017    Flu vaccine (1) 08/01/2024    COVID-19 Vaccine (3 - 2023-24 season) 09/01/2024     \"Have you been to the ER, urgent care clinic since your last visit?  Hospitalized since your last visit?\"    YES - When: approximately 1 days ago.  Where and Why: [facility]  1 day ago Shingles Dx .    “Have you seen or consulted any other health care providers outside of [facility] since your last visit?”    no    Have you had a mammogram?”   NO    No breast cancer screening on file         “Have you had a colorectal cancer screening such as a colonoscopy/FIT/Cologuard?    NO    No colonoscopy on file  No cologuard on file  No FIT/FOBT on file   No flexible sigmoidoscopy on file         Click Here for Release of Records Request         Desirae CABALLERO

## 2024-12-04 DIAGNOSIS — B02.8 HERPES ZOSTER COMPLICATED: Primary | ICD-10-CM

## 2024-12-04 RX ORDER — PREDNISONE 10 MG/1
TABLET ORAL
Qty: 21 TABLET | Refills: 0 | Status: SHIPPED | OUTPATIENT
Start: 2024-12-04 | End: 2024-12-09

## 2024-12-07 NOTE — PROGRESS NOTES
Subjective     Chief Complaint   Patient presents with    Rash     Pt c/o rash , on the right side of face , pt states the side of her face is red tingling and numb and it feels like something is cutting her. Sx started Friday. Pt states it started progressing in size by Saturday.        Patient is 73 year old female presenting with rash on right side of face that began on Friday.  She describes rash as tingling, numbness and \"feels like I'm being cut with glass\".  She reports history of chicken pox as child.  She has has not received shingles vaccine.  She works in assisted living facility.      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     Sunburn, blistering     Tanning bed exposure        Past Surgical History:   Procedure Laterality Date     SECTION  ,,1993     x 3    CHOLECYSTECTOMY  1974    NEUROLOGICAL SURGERY  2017    synovial cyst t spine       Family History   Problem Relation Age of Onset    Hypertension Father     Diabetes Father     Heart Disease Father 60            Parkinsonism Mother        Allergies   Allergen Reactions    Codeine Nausea And Vomiting    Alendronate Nausea Only    Cephalexin Angioedema       Social History     Tobacco Use    Smoking status: Every Day     Current packs/day: 0.25     Average packs/day: 0.3 packs/day for 3.5 years (0.9 ttl pk-yrs)     Types: Cigarettes     Start date: 2021     Last attempt to quit: 2020    Smokeless tobacco: Former     Quit date: 2014   Vaping Use    Vaping status: Never Used   Substance Use Topics    Alcohol use: Yes     Alcohol/week:

## 2024-12-14 DIAGNOSIS — I10 ESSENTIAL (PRIMARY) HYPERTENSION: ICD-10-CM

## 2024-12-16 RX ORDER — LOSARTAN POTASSIUM 50 MG/1
50 TABLET ORAL DAILY
Qty: 90 TABLET | Refills: 1 | Status: SHIPPED | OUTPATIENT
Start: 2024-12-16

## 2024-12-17 ENCOUNTER — PATIENT MESSAGE (OUTPATIENT)
Age: 74
End: 2024-12-17

## 2024-12-17 NOTE — TELEPHONE ENCOUNTER
Left vm and sent OOTU message requesting [facility] to [facility]. Left number for patient to return valentino.

## 2024-12-19 ENCOUNTER — OFFICE VISIT (OUTPATIENT)
Age: 74
End: 2024-12-19
Payer: MEDICARE

## 2024-12-19 VITALS
OXYGEN SATURATION: 97 % | HEIGHT: 62 IN | HEART RATE: 88 BPM | RESPIRATION RATE: 20 BRPM | DIASTOLIC BLOOD PRESSURE: 99 MMHG | BODY MASS INDEX: 26.83 KG/M2 | WEIGHT: 145.8 LBS | TEMPERATURE: 98.1 F | SYSTOLIC BLOOD PRESSURE: 156 MMHG

## 2024-12-19 DIAGNOSIS — B02.8 HERPES ZOSTER COMPLICATED: ICD-10-CM

## 2024-12-19 DIAGNOSIS — B02.29 POST HERPETIC NEURALGIA: Primary | ICD-10-CM

## 2024-12-19 PROCEDURE — 99213 OFFICE O/P EST LOW 20 MIN: CPT | Performed by: INTERNAL MEDICINE

## 2024-12-19 PROCEDURE — 1090F PRES/ABSN URINE INCON ASSESS: CPT | Performed by: INTERNAL MEDICINE

## 2024-12-19 PROCEDURE — 1126F AMNT PAIN NOTED NONE PRSNT: CPT | Performed by: INTERNAL MEDICINE

## 2024-12-19 PROCEDURE — 3017F COLORECTAL CA SCREEN DOC REV: CPT | Performed by: INTERNAL MEDICINE

## 2024-12-19 PROCEDURE — G8419 CALC BMI OUT NRM PARAM NOF/U: HCPCS | Performed by: INTERNAL MEDICINE

## 2024-12-19 PROCEDURE — 1159F MED LIST DOCD IN RCRD: CPT | Performed by: INTERNAL MEDICINE

## 2024-12-19 PROCEDURE — 4004F PT TOBACCO SCREEN RCVD TLK: CPT | Performed by: INTERNAL MEDICINE

## 2024-12-19 PROCEDURE — 3080F DIAST BP >= 90 MM HG: CPT | Performed by: INTERNAL MEDICINE

## 2024-12-19 PROCEDURE — G8484 FLU IMMUNIZE NO ADMIN: HCPCS | Performed by: INTERNAL MEDICINE

## 2024-12-19 PROCEDURE — G8427 DOCREV CUR MEDS BY ELIG CLIN: HCPCS | Performed by: INTERNAL MEDICINE

## 2024-12-19 PROCEDURE — 1123F ACP DISCUSS/DSCN MKR DOCD: CPT | Performed by: INTERNAL MEDICINE

## 2024-12-19 PROCEDURE — 3077F SYST BP >= 140 MM HG: CPT | Performed by: INTERNAL MEDICINE

## 2024-12-19 PROCEDURE — G8399 PT W/DXA RESULTS DOCUMENT: HCPCS | Performed by: INTERNAL MEDICINE

## 2024-12-19 RX ORDER — GABAPENTIN 300 MG/1
300 CAPSULE ORAL EVERY 6 HOURS PRN
Qty: 30 CAPSULE | Refills: 0 | Status: CANCELLED | OUTPATIENT
Start: 2024-12-19 | End: 2025-01-18

## 2024-12-19 RX ORDER — GABAPENTIN 300 MG/1
CAPSULE ORAL
Qty: 270 CAPSULE | Refills: 0 | Status: SHIPPED | OUTPATIENT
Start: 2024-12-19 | End: 2025-03-19

## 2024-12-19 NOTE — PROGRESS NOTES
Day 19 since dx of zoster, still sensory pain  no muscular issues, 7 days of valtrex  Saw VEI and is OK took 30 gabapentin some relief can't sleep  Working  No new blisters  Vision ok  Hearing ok    Patient Active Problem List    Diagnosis Date Noted    Major depressive disorder, recurrent, mild 05/30/2024    Major depressive disorder, recurrent, unspecified 05/30/2024    Vertebral compression fracture (HCC) 05/11/2020    Thoracic back pain 05/11/2020    Multiple rib fractures 05/11/2020    Rib pain on right side 05/11/2020    Tobacco abuse 04/13/2017    Hepatitis C antibody test positive 07/14/2015    Aortic calcification (HCC) 01/23/2014    Lipoma of back 06/15/2012    HLD (hyperlipidemia) 04/04/2012    Osteoporosis 12/07/2010    Raynaud's disease     Hypertension     Depression      Vitals:    12/19/24 0825   BP: (!) 156/99   Site: Left Upper Arm   Position: Sitting   Cuff Size: Medium Adult   Pulse: 88   Resp: 20   Temp: 98.1 °F (36.7 °C)   TempSrc: Temporal   SpO2: 97%   Weight: 66.1 kg (145 lb 12.8 oz)   Height: 1.575 m (5' 2\")     Anxious    Reddened right face trigeminal pattern  Eye OK      Karolina was seen today for lesion(s), hearing problem and headache.    Diagnoses and all orders for this visit:    Post herpetic neuralgia  -     gabapentin (NEURONTIN) 300 MG capsule; One po QAM and 2 po at HS    Herpes zoster complicated      Explained can go on for months   use advil prn with PPI    Try 4% lidocaine gel too  See 30 days

## 2024-12-19 NOTE — PROGRESS NOTES
I have reviewed all needed documentation in preparation for visit. Verified patient by name and date of birth  Chief Complaint   Patient presents with    Lesion(s)     Patient has active shingles; patient is still having painful red spots ; on cheeks  and in mouth and nose    Hearing Problem    Headache       Vitals:    12/19/24 0825   BP: (!) 156/99   Site: Left Upper Arm   Position: Sitting   Cuff Size: Medium Adult   Pulse: 88   Resp: 20   Temp: 98.1 °F (36.7 °C)   TempSrc: Temporal   SpO2: 97%   Weight: 66.1 kg (145 lb 12.8 oz)   Height: 1.575 m (5' 2\")       Health Maintenance Due   Topic Date Due    Breast cancer screen  Never done    Colorectal Cancer Screen  Never done    Shingles vaccine (1 of 2) Never done    DTaP/Tdap/Td vaccine (1 - Tdap) 01/03/2008    Pneumococcal 65+ years Vaccine (2 of 2 - PPSV23 or PCV20) 12/27/2017    Flu vaccine (1) 08/01/2024    COVID-19 Vaccine (3 - 2023-24 season) 09/01/2024     \"Have you been to the ER, urgent care clinic since your last visit?  Hospitalized since your last visit?\"    YES - When: approximately 2  weeks ago.  Where and Why: [facility] urgent [facility] .    “Have you seen or consulted any other health care providers outside of [facility] since your last visit?”    NO    Have you had a mammogram?”   NO    No breast cancer screening on file         “Have you had a colorectal cancer screening such as a colonoscopy/FIT/Cologuard?    NO    No colonoscopy on file  No cologuard on file  No FIT/FOBT on file   No flexible sigmoidoscopy on file         Click Here for Release of Records Request         RIZWAN Mills

## 2025-01-30 RX ORDER — BUPROPION HYDROCHLORIDE 150 MG/1
TABLET, EXTENDED RELEASE ORAL
Qty: 180 TABLET | Refills: 1 | Status: SHIPPED | OUTPATIENT
Start: 2025-01-30

## 2025-05-25 ENCOUNTER — OFFICE VISIT (OUTPATIENT)
Age: 75
End: 2025-05-25

## 2025-05-25 VITALS
OXYGEN SATURATION: 96 % | BODY MASS INDEX: 27.79 KG/M2 | WEIGHT: 151 LBS | HEART RATE: 88 BPM | SYSTOLIC BLOOD PRESSURE: 146 MMHG | HEIGHT: 62 IN | DIASTOLIC BLOOD PRESSURE: 93 MMHG | TEMPERATURE: 98 F | RESPIRATION RATE: 15 BRPM

## 2025-05-25 DIAGNOSIS — R39.9 UTI SYMPTOMS: Primary | ICD-10-CM

## 2025-05-25 DIAGNOSIS — N30.00 ACUTE CYSTITIS WITHOUT HEMATURIA: ICD-10-CM

## 2025-05-25 LAB
BILIRUBIN, URINE, POC: NEGATIVE
BLOOD URINE, POC: ABNORMAL
GLUCOSE URINE, POC: ABNORMAL
KETONES, URINE, POC: NEGATIVE
LEUKOCYTE ESTERASE, URINE, POC: ABNORMAL
NITRITE, URINE, POC: POSITIVE
PH, URINE, POC: 6.5 (ref 4.6–8)
PROTEIN,URINE, POC: NEGATIVE
SPECIFIC GRAVITY, URINE, POC: 1.02 (ref 1–1.03)
URINALYSIS CLARITY, POC: CLEAR
URINALYSIS COLOR, POC: ABNORMAL
UROBILINOGEN, POC: NORMAL MG/DL

## 2025-05-25 RX ORDER — SULFAMETHOXAZOLE AND TRIMETHOPRIM 800; 160 MG/1; MG/1
1 TABLET ORAL 2 TIMES DAILY
Qty: 14 TABLET | Refills: 0 | Status: SHIPPED | OUTPATIENT
Start: 2025-05-25 | End: 2025-06-01

## 2025-05-25 RX ORDER — ASPIRIN 81 MG/1
81 TABLET ORAL
COMMUNITY

## 2025-05-25 RX ORDER — NIACINAMIDE 500 MG
TABLET ORAL
COMMUNITY
Start: 2025-04-02

## 2025-05-25 RX ORDER — VALACYCLOVIR HYDROCHLORIDE 1 G/1
TABLET, FILM COATED ORAL
COMMUNITY

## 2025-06-02 ENCOUNTER — TELEPHONE (OUTPATIENT)
Age: 75
End: 2025-06-02

## 2025-06-02 NOTE — TELEPHONE ENCOUNTER
Left vm requesting patient return call to schedule an appointment to establish care with either Dr. Richey or Dr. Loco. Left number for patient to return call.

## 2025-06-12 ENCOUNTER — TRANSCRIBE ORDERS (OUTPATIENT)
Facility: HOSPITAL | Age: 75
End: 2025-06-12

## 2025-06-12 DIAGNOSIS — E78.5 HYPERLIPIDEMIA, UNSPECIFIED HYPERLIPIDEMIA TYPE: ICD-10-CM

## 2025-06-12 DIAGNOSIS — Z00.00 ROUTINE GENERAL MEDICAL EXAMINATION AT A HEALTH CARE FACILITY: ICD-10-CM

## 2025-06-12 DIAGNOSIS — Z13.31 STANDARDIZED ADULT DEPRESSION SCREENING TOOL COMPLETED: ICD-10-CM

## 2025-06-12 DIAGNOSIS — F32.A DEPRESSION, UNSPECIFIED DEPRESSION TYPE: Primary | ICD-10-CM

## 2025-06-12 DIAGNOSIS — I10 HYPERTENSION, UNSPECIFIED TYPE: ICD-10-CM

## 2025-06-12 DIAGNOSIS — F17.200 NICOTINE DEPENDENCE, UNCOMPLICATED, UNSPECIFIED NICOTINE PRODUCT TYPE: ICD-10-CM

## 2025-06-17 RX ORDER — PRAVASTATIN SODIUM 80 MG/1
80 TABLET ORAL NIGHTLY
Qty: 90 TABLET | Refills: 1 | Status: SHIPPED | OUTPATIENT
Start: 2025-06-17

## 2025-06-17 NOTE — TELEPHONE ENCOUNTER
Refill request received from [facility] for   Requested Prescriptions     Pending Prescriptions Disp Refills    pravastatin (PRAVACHOL) 80 MG tablet 90 tablet 3     Sig: Take 1 tablet by mouth nightly     Last office visit: 12/19/2024   Next office visit: Visit date not found     Routed to Dr Freida Richey for review.

## 2025-08-18 ENCOUNTER — COMMUNITY OUTREACH (OUTPATIENT)
Age: 75
End: 2025-08-18

## 2025-08-22 RX ORDER — BUPROPION HYDROCHLORIDE 150 MG/1
150 TABLET, EXTENDED RELEASE ORAL 2 TIMES DAILY
Qty: 180 TABLET | Refills: 1 | Status: SHIPPED | OUTPATIENT
Start: 2025-08-22